# Patient Record
Sex: MALE | NOT HISPANIC OR LATINO | Employment: OTHER | URBAN - METROPOLITAN AREA
[De-identification: names, ages, dates, MRNs, and addresses within clinical notes are randomized per-mention and may not be internally consistent; named-entity substitution may affect disease eponyms.]

---

## 2023-02-14 PROBLEM — G89.29 CHRONIC LEFT SHOULDER PAIN: Status: ACTIVE | Noted: 2023-02-14

## 2023-02-14 PROBLEM — N28.9 ABNORMAL KIDNEY FUNCTION: Status: ACTIVE | Noted: 2023-02-14

## 2023-02-14 PROBLEM — R35.1 NOCTURIA: Status: ACTIVE | Noted: 2023-02-14

## 2023-02-14 PROBLEM — R26.0 ATAXIC GAIT: Status: ACTIVE | Noted: 2023-02-14

## 2023-02-14 PROBLEM — M25.512 CHRONIC LEFT SHOULDER PAIN: Status: ACTIVE | Noted: 2023-02-14

## 2023-02-14 PROBLEM — N18.30: Status: ACTIVE | Noted: 2023-02-14

## 2023-02-14 PROBLEM — R97.20 ELEVATED PSA: Status: ACTIVE | Noted: 2023-02-14

## 2023-02-14 PROBLEM — H69.90 EUSTACHIAN TUBE DYSFUNCTION: Status: ACTIVE | Noted: 2023-02-14

## 2023-02-14 PROBLEM — E53.8 VITAMIN B12 DEFICIENCY: Status: ACTIVE | Noted: 2023-02-14

## 2023-02-14 PROBLEM — M25.551 HIP PAIN, BILATERAL: Status: ACTIVE | Noted: 2023-02-14

## 2023-02-14 PROBLEM — N18.30 CKD STAGE 3 SECONDARY TO DIABETES (MULTI): Status: ACTIVE | Noted: 2023-02-14

## 2023-02-14 PROBLEM — G89.29 CHRONIC BILATERAL LOW BACK PAIN WITHOUT SCIATICA: Status: ACTIVE | Noted: 2023-02-14

## 2023-02-14 PROBLEM — E11.9 INSULIN-REQUIRING OR DEPENDENT TYPE II DIABETES MELLITUS (MULTI): Status: ACTIVE | Noted: 2023-02-14

## 2023-02-14 PROBLEM — K80.50 CHOLEDOCHOLITHIASIS: Status: ACTIVE | Noted: 2023-02-14

## 2023-02-14 PROBLEM — E13.22: Status: ACTIVE | Noted: 2023-02-14

## 2023-02-14 PROBLEM — R10.84 GENERALIZED ABDOMINAL PAIN: Status: ACTIVE | Noted: 2023-02-14

## 2023-02-14 PROBLEM — Z79.4 INSULIN-REQUIRING OR DEPENDENT TYPE II DIABETES MELLITUS (MULTI): Status: ACTIVE | Noted: 2023-02-14

## 2023-02-14 PROBLEM — E11.40 DIABETES MELLITUS WITH DIABETIC NEUROPATHY (MULTI): Status: ACTIVE | Noted: 2023-02-14

## 2023-02-14 PROBLEM — M25.552 HIP PAIN, BILATERAL: Status: ACTIVE | Noted: 2023-02-14

## 2023-02-14 PROBLEM — J30.9 ALLERGIC RHINITIS: Status: ACTIVE | Noted: 2023-02-14

## 2023-02-14 PROBLEM — E55.9 VITAMIN D DEFICIENCY: Status: ACTIVE | Noted: 2023-02-14

## 2023-02-14 PROBLEM — I10 BENIGN ESSENTIAL HYPERTENSION: Status: ACTIVE | Noted: 2023-02-14

## 2023-02-14 PROBLEM — M19.90 ARTHRITIS: Status: ACTIVE | Noted: 2023-02-14

## 2023-02-14 PROBLEM — M54.50 CHRONIC BILATERAL LOW BACK PAIN WITHOUT SCIATICA: Status: ACTIVE | Noted: 2023-02-14

## 2023-02-14 PROBLEM — N40.1 ENLARGED PROSTATE WITH LOWER URINARY TRACT SYMPTOMS (LUTS): Status: ACTIVE | Noted: 2023-02-14

## 2023-02-14 PROBLEM — R06.6 INTRACTABLE HICCUPS: Status: ACTIVE | Noted: 2023-02-14

## 2023-02-14 PROBLEM — N39.43 POST-VOID DRIBBLING: Status: ACTIVE | Noted: 2023-02-14

## 2023-02-14 PROBLEM — R17 JAUNDICE: Status: ACTIVE | Noted: 2023-02-14

## 2023-02-14 PROBLEM — K83.09 ASCENDING CHOLANGITIS (CMS-HCC): Status: ACTIVE | Noted: 2023-02-14

## 2023-02-14 PROBLEM — E11.22 CKD STAGE 3 SECONDARY TO DIABETES (MULTI): Status: ACTIVE | Noted: 2023-02-14

## 2023-02-14 PROBLEM — L30.9 ECZEMA: Status: ACTIVE | Noted: 2023-02-14

## 2023-02-14 PROBLEM — E11.9 DIABETES MELLITUS (MULTI): Status: ACTIVE | Noted: 2023-02-14

## 2023-02-14 PROBLEM — R50.9 FEVER AND CHILLS: Status: ACTIVE | Noted: 2023-02-14

## 2023-02-14 PROBLEM — K21.9 GERD (GASTROESOPHAGEAL REFLUX DISEASE): Status: ACTIVE | Noted: 2023-02-14

## 2023-02-14 PROBLEM — E78.5 HYPERLIPIDEMIA: Status: ACTIVE | Noted: 2023-02-14

## 2023-02-14 PROBLEM — M89.9 BONE DISORDER: Status: ACTIVE | Noted: 2023-02-14

## 2023-02-14 RX ORDER — LOSARTAN POTASSIUM 50 MG/1
1 TABLET ORAL DAILY
COMMUNITY
Start: 2019-02-28 | End: 2024-04-08

## 2023-02-14 RX ORDER — INSULIN LISPRO 100 [IU]/ML
3-7 INJECTION, SOLUTION INTRAVENOUS; SUBCUTANEOUS 3 TIMES DAILY
COMMUNITY
Start: 2018-08-01 | End: 2024-02-05

## 2023-02-14 RX ORDER — TAMSULOSIN HYDROCHLORIDE 0.4 MG/1
0.4 CAPSULE ORAL NIGHTLY
COMMUNITY
Start: 2019-11-25 | End: 2024-03-07 | Stop reason: SDUPTHER

## 2023-02-14 RX ORDER — OMEPRAZOLE 20 MG/1
1 CAPSULE, DELAYED RELEASE ORAL
COMMUNITY
End: 2024-02-26

## 2023-02-14 RX ORDER — BLOOD SUGAR DIAGNOSTIC
STRIP MISCELLANEOUS
COMMUNITY
Start: 2018-05-15

## 2023-02-14 RX ORDER — INSULIN GLARGINE 100 [IU]/ML
30 INJECTION, SOLUTION SUBCUTANEOUS EVERY MORNING
COMMUNITY
Start: 2018-08-01 | End: 2023-03-13 | Stop reason: SDUPTHER

## 2023-02-14 RX ORDER — LANCETS
EACH MISCELLANEOUS
COMMUNITY

## 2023-02-14 RX ORDER — PEN NEEDLE, DIABETIC 30 GX3/16"
NEEDLE, DISPOSABLE MISCELLANEOUS
COMMUNITY
End: 2024-02-05

## 2023-02-14 RX ORDER — ACETAMINOPHEN, DEXTROMETHORPHAN HBR, DOXYLAMINE SUCCINATE, PHENYLEPHRINE HCL 650; 20; 12.5; 1 MG/30ML; MG/30ML; MG/30ML; MG/30ML
2 SOLUTION ORAL EVERY MORNING
COMMUNITY
Start: 2018-05-21

## 2023-02-14 RX ORDER — MULTIVITAMIN
1 TABLET ORAL EVERY MORNING
COMMUNITY

## 2023-02-14 RX ORDER — NAPROXEN SODIUM 220 MG/1
1 TABLET ORAL DAILY
COMMUNITY

## 2023-02-14 RX ORDER — SIMVASTATIN 20 MG/1
20 TABLET, FILM COATED ORAL EVERY EVENING
COMMUNITY
Start: 2016-06-16 | End: 2024-01-19

## 2023-02-14 RX ORDER — ACETAMINOPHEN 500 MG
1 TABLET ORAL EVERY MORNING
COMMUNITY
Start: 2018-05-21

## 2023-02-14 RX ORDER — CALCIUM CITRATE/VITAMIN D3 200MG-6.25
TABLET ORAL
COMMUNITY
Start: 2019-02-28

## 2023-03-13 DIAGNOSIS — E11.40 TYPE 2 DIABETES MELLITUS WITH DIABETIC NEUROPATHY, UNSPECIFIED WHETHER LONG TERM INSULIN USE (MULTI): ICD-10-CM

## 2023-03-13 RX ORDER — INSULIN GLARGINE 100 [IU]/ML
30 INJECTION, SOLUTION SUBCUTANEOUS EVERY MORNING
Qty: 3 ML | Refills: 1 | Status: SHIPPED | OUTPATIENT
Start: 2023-03-13 | End: 2023-06-08

## 2023-03-27 ENCOUNTER — APPOINTMENT (OUTPATIENT)
Dept: PRIMARY CARE | Facility: CLINIC | Age: 83
End: 2023-03-27
Payer: MEDICARE

## 2023-04-05 ENCOUNTER — APPOINTMENT (OUTPATIENT)
Dept: PRIMARY CARE | Facility: CLINIC | Age: 83
End: 2023-04-05
Payer: MEDICARE

## 2023-04-06 ENCOUNTER — APPOINTMENT (OUTPATIENT)
Dept: PRIMARY CARE | Facility: CLINIC | Age: 83
End: 2023-04-06
Payer: MEDICARE

## 2023-05-15 ENCOUNTER — OFFICE VISIT (OUTPATIENT)
Dept: PRIMARY CARE | Facility: CLINIC | Age: 83
End: 2023-05-15
Payer: MEDICARE

## 2023-05-15 VITALS
HEART RATE: 80 BPM | SYSTOLIC BLOOD PRESSURE: 122 MMHG | DIASTOLIC BLOOD PRESSURE: 64 MMHG | HEIGHT: 70 IN | BODY MASS INDEX: 29.35 KG/M2 | WEIGHT: 205 LBS

## 2023-05-15 DIAGNOSIS — M35.3 POLYMYALGIA RHEUMATICA (MULTI): ICD-10-CM

## 2023-05-15 DIAGNOSIS — Z79.4 TYPE 2 DIABETES MELLITUS WITH DIABETIC NEUROPATHY, WITH LONG-TERM CURRENT USE OF INSULIN (MULTI): Primary | ICD-10-CM

## 2023-05-15 DIAGNOSIS — N18.30 CKD STAGE 3 SECONDARY TO DIABETES (MULTI): ICD-10-CM

## 2023-05-15 DIAGNOSIS — G89.29 CHRONIC RIGHT-SIDED LOW BACK PAIN WITHOUT SCIATICA: ICD-10-CM

## 2023-05-15 DIAGNOSIS — R35.1 NOCTURIA: ICD-10-CM

## 2023-05-15 DIAGNOSIS — M54.50 CHRONIC RIGHT-SIDED LOW BACK PAIN WITHOUT SCIATICA: ICD-10-CM

## 2023-05-15 DIAGNOSIS — E11.40 TYPE 2 DIABETES MELLITUS WITH DIABETIC NEUROPATHY, WITH LONG-TERM CURRENT USE OF INSULIN (MULTI): Primary | ICD-10-CM

## 2023-05-15 DIAGNOSIS — E11.22 CKD STAGE 3 SECONDARY TO DIABETES (MULTI): ICD-10-CM

## 2023-05-15 DIAGNOSIS — E11.40 TYPE 2 DIABETES MELLITUS WITH DIABETIC NEUROPATHY, UNSPECIFIED WHETHER LONG TERM INSULIN USE (MULTI): ICD-10-CM

## 2023-05-15 DIAGNOSIS — E78.5 DYSLIPIDEMIA, GOAL LDL BELOW 70: ICD-10-CM

## 2023-05-15 DIAGNOSIS — E53.8 VITAMIN B12 DEFICIENCY: ICD-10-CM

## 2023-05-15 PROCEDURE — 99214 OFFICE O/P EST MOD 30 MIN: CPT | Performed by: INTERNAL MEDICINE

## 2023-05-15 PROCEDURE — 3074F SYST BP LT 130 MM HG: CPT | Performed by: INTERNAL MEDICINE

## 2023-05-15 PROCEDURE — 1159F MED LIST DOCD IN RCRD: CPT | Performed by: INTERNAL MEDICINE

## 2023-05-15 PROCEDURE — 3078F DIAST BP <80 MM HG: CPT | Performed by: INTERNAL MEDICINE

## 2023-05-15 PROCEDURE — 1036F TOBACCO NON-USER: CPT | Performed by: INTERNAL MEDICINE

## 2023-05-15 PROCEDURE — 1160F RVW MEDS BY RX/DR IN RCRD: CPT | Performed by: INTERNAL MEDICINE

## 2023-05-15 ASSESSMENT — PATIENT HEALTH QUESTIONNAIRE - PHQ9
1. LITTLE INTEREST OR PLEASURE IN DOING THINGS: NOT AT ALL
2. FEELING DOWN, DEPRESSED OR HOPELESS: NOT AT ALL
SUM OF ALL RESPONSES TO PHQ9 QUESTIONS 1 AND 2: 0

## 2023-05-15 NOTE — PROGRESS NOTES
"Subjective   Patient ID: Kevin Donato is a 83 y.o. male who presents for 6 month follow up.    He saw orthopedics, Dr. Dupree, had an injection in the right knee and right thumb  His back and knees bother him. Has been wearing a belt  Hurts in his butt/right side, takes bufferin, takes 2 a day.   He has been more forget  His back hurts more when he stands for awhile.     No cp or pressure  No sob  Bowels are regular  Urine flow is good.   He has not been taking the gabapentin         Review of Systems    Objective   Pulse 80   Ht 1.778 m (5' 10\")   Wt 93 kg (205 lb)   BMI 29.41 kg/m²     Physical Exam  Constitutional:       Appearance: Normal appearance.   Neck:      Vascular: No carotid bruit.   Cardiovascular:      Rate and Rhythm: Normal rate and regular rhythm.      Heart sounds: No murmur heard.  Pulmonary:      Effort: Pulmonary effort is normal.      Breath sounds: Normal breath sounds.   Musculoskeletal:      Right lower leg: No edema.      Left lower leg: No edema.      Comments: Tender ropy right lumbar paraspinal mm  No foot drop   Lymphadenopathy:      Cervical: No cervical adenopathy.   Neurological:      Mental Status: He is alert.      Comments: Slight left facial droop,, wife states chronic   Psychiatric:         Mood and Affect: Mood normal.         Thought Content: Thought content normal.         Assessment/Plan          "

## 2023-05-15 NOTE — PATIENT INSTRUCTIONS
Bp is good    Get fasting labs    Lower back pain, get physical therapy and if not better, would get MRI    For your thumb arthtritis, see Dr. Chopra

## 2023-05-17 ENCOUNTER — LAB (OUTPATIENT)
Dept: LAB | Facility: LAB | Age: 83
End: 2023-05-17
Payer: MEDICARE

## 2023-05-17 DIAGNOSIS — E11.40 TYPE 2 DIABETES MELLITUS WITH DIABETIC NEUROPATHY, WITH LONG-TERM CURRENT USE OF INSULIN (MULTI): ICD-10-CM

## 2023-05-17 DIAGNOSIS — Z79.4 TYPE 2 DIABETES MELLITUS WITH DIABETIC NEUROPATHY, WITH LONG-TERM CURRENT USE OF INSULIN (MULTI): ICD-10-CM

## 2023-05-17 DIAGNOSIS — E78.5 DYSLIPIDEMIA, GOAL LDL BELOW 70: ICD-10-CM

## 2023-05-17 DIAGNOSIS — R35.1 NOCTURIA: ICD-10-CM

## 2023-05-17 LAB
ALANINE AMINOTRANSFERASE (SGPT) (U/L) IN SER/PLAS: 8 U/L (ref 10–52)
ALBUMIN (G/DL) IN SER/PLAS: 4.2 G/DL (ref 3.4–5)
ALKALINE PHOSPHATASE (U/L) IN SER/PLAS: 65 U/L (ref 33–136)
ANION GAP IN SER/PLAS: 13 MMOL/L (ref 10–20)
ASPARTATE AMINOTRANSFERASE (SGOT) (U/L) IN SER/PLAS: 17 U/L (ref 9–39)
BASOPHILS (10*3/UL) IN BLOOD BY AUTOMATED COUNT: 0.06 X10E9/L (ref 0–0.1)
BASOPHILS/100 LEUKOCYTES IN BLOOD BY AUTOMATED COUNT: 0.9 % (ref 0–2)
BILIRUBIN TOTAL (MG/DL) IN SER/PLAS: 1 MG/DL (ref 0–1.2)
CALCIUM (MG/DL) IN SER/PLAS: 9.1 MG/DL (ref 8.6–10.3)
CARBON DIOXIDE, TOTAL (MMOL/L) IN SER/PLAS: 26 MMOL/L (ref 21–32)
CHLORIDE (MMOL/L) IN SER/PLAS: 106 MMOL/L (ref 98–107)
CHOLESTEROL (MG/DL) IN SER/PLAS: 133 MG/DL (ref 0–199)
CHOLESTEROL IN HDL (MG/DL) IN SER/PLAS: 47.2 MG/DL
CHOLESTEROL/HDL RATIO: 2.8
CREATINE KINASE (U/L) IN SER/PLAS: 104 U/L (ref 0–325)
CREATININE (MG/DL) IN SER/PLAS: 1.78 MG/DL (ref 0.5–1.3)
EOSINOPHILS (10*3/UL) IN BLOOD BY AUTOMATED COUNT: 0.62 X10E9/L (ref 0–0.4)
EOSINOPHILS/100 LEUKOCYTES IN BLOOD BY AUTOMATED COUNT: 9.1 % (ref 0–6)
ERYTHROCYTE DISTRIBUTION WIDTH (RATIO) BY AUTOMATED COUNT: 12.6 % (ref 11.5–14.5)
ERYTHROCYTE MEAN CORPUSCULAR HEMOGLOBIN CONCENTRATION (G/DL) BY AUTOMATED: 32.9 G/DL (ref 32–36)
ERYTHROCYTE MEAN CORPUSCULAR VOLUME (FL) BY AUTOMATED COUNT: 100 FL (ref 80–100)
ERYTHROCYTES (10*6/UL) IN BLOOD BY AUTOMATED COUNT: 4.27 X10E12/L (ref 4.5–5.9)
GFR MALE: 37 ML/MIN/1.73M2
GLUCOSE (MG/DL) IN SER/PLAS: 116 MG/DL (ref 74–99)
HEMATOCRIT (%) IN BLOOD BY AUTOMATED COUNT: 42.6 % (ref 41–52)
HEMOGLOBIN (G/DL) IN BLOOD: 14 G/DL (ref 13.5–17.5)
IMMATURE GRANULOCYTES/100 LEUKOCYTES IN BLOOD BY AUTOMATED COUNT: 0.3 % (ref 0–0.9)
LDL: 66 MG/DL (ref 0–99)
LEUKOCYTES (10*3/UL) IN BLOOD BY AUTOMATED COUNT: 6.8 X10E9/L (ref 4.4–11.3)
LYMPHOCYTES (10*3/UL) IN BLOOD BY AUTOMATED COUNT: 2.16 X10E9/L (ref 0.8–3)
LYMPHOCYTES/100 LEUKOCYTES IN BLOOD BY AUTOMATED COUNT: 31.7 % (ref 13–44)
MONOCYTES (10*3/UL) IN BLOOD BY AUTOMATED COUNT: 0.5 X10E9/L (ref 0.05–0.8)
MONOCYTES/100 LEUKOCYTES IN BLOOD BY AUTOMATED COUNT: 7.3 % (ref 2–10)
NEUTROPHILS (10*3/UL) IN BLOOD BY AUTOMATED COUNT: 3.46 X10E9/L (ref 1.6–5.5)
NEUTROPHILS/100 LEUKOCYTES IN BLOOD BY AUTOMATED COUNT: 50.7 % (ref 40–80)
PLATELETS (10*3/UL) IN BLOOD AUTOMATED COUNT: 174 X10E9/L (ref 150–450)
POTASSIUM (MMOL/L) IN SER/PLAS: 4.4 MMOL/L (ref 3.5–5.3)
PROSTATE SPECIFIC AG (NG/ML) IN SER/PLAS: 4.78 NG/ML (ref 0–4)
PROTEIN TOTAL: 6.6 G/DL (ref 6.4–8.2)
SODIUM (MMOL/L) IN SER/PLAS: 141 MMOL/L (ref 136–145)
THYROTROPIN (MIU/L) IN SER/PLAS BY DETECTION LIMIT <= 0.05 MIU/L: 1.52 MIU/L (ref 0.44–3.98)
TRIGLYCERIDE (MG/DL) IN SER/PLAS: 100 MG/DL (ref 0–149)
UREA NITROGEN (MG/DL) IN SER/PLAS: 26 MG/DL (ref 6–23)
VLDL: 20 MG/DL (ref 0–40)

## 2023-05-17 PROCEDURE — 80053 COMPREHEN METABOLIC PANEL: CPT

## 2023-05-17 PROCEDURE — 84443 ASSAY THYROID STIM HORMONE: CPT

## 2023-05-17 PROCEDURE — 36415 COLL VENOUS BLD VENIPUNCTURE: CPT

## 2023-05-17 PROCEDURE — 84153 ASSAY OF PSA TOTAL: CPT

## 2023-05-17 PROCEDURE — 82550 ASSAY OF CK (CPK): CPT

## 2023-05-17 PROCEDURE — 83036 HEMOGLOBIN GLYCOSYLATED A1C: CPT

## 2023-05-17 PROCEDURE — 80061 LIPID PANEL: CPT

## 2023-05-17 PROCEDURE — 85025 COMPLETE CBC W/AUTO DIFF WBC: CPT

## 2023-05-18 LAB
ESTIMATED AVERAGE GLUCOSE FOR HBA1C: 166 MG/DL
HEMOGLOBIN A1C/HEMOGLOBIN TOTAL IN BLOOD: 7.4 %

## 2023-06-08 DIAGNOSIS — G89.29 CHRONIC BILATERAL LOW BACK PAIN WITHOUT SCIATICA: ICD-10-CM

## 2023-06-08 DIAGNOSIS — M54.50 CHRONIC BILATERAL LOW BACK PAIN WITHOUT SCIATICA: ICD-10-CM

## 2023-06-08 DIAGNOSIS — E11.40 TYPE 2 DIABETES MELLITUS WITH DIABETIC NEUROPATHY, UNSPECIFIED WHETHER LONG TERM INSULIN USE (MULTI): ICD-10-CM

## 2023-06-08 RX ORDER — INSULIN GLARGINE 100 [IU]/ML
INJECTION, SOLUTION SUBCUTANEOUS
Qty: 15 ML | Refills: 0 | Status: SHIPPED | OUTPATIENT
Start: 2023-06-08 | End: 2023-07-14

## 2023-06-08 RX ORDER — GABAPENTIN 100 MG/1
CAPSULE ORAL
Qty: 120 CAPSULE | Refills: 1 | Status: SHIPPED | OUTPATIENT
Start: 2023-06-08 | End: 2023-09-20

## 2023-06-08 NOTE — TELEPHONE ENCOUNTER
Requested Prescriptions     Pending Prescriptions Disp Refills    gabapentin (Neurontin) 100 mg capsule [Pharmacy Med Name: GABAPENTIN 100 MG CAPSULE] 120 capsule 0     Sig: TAKE 2 CAPSULE BY MOUTH 2 TIMES A DAY    Lantus Solostar U-100 Insulin 100 unit/mL (3 mL) pen [Pharmacy Med Name: LANTUS SOLOSTAR 100 UNITS/ML] 15 mL 0     Sig: Inject 30 Units under the skin once daily in the morning.

## 2023-07-13 DIAGNOSIS — E11.40 TYPE 2 DIABETES MELLITUS WITH DIABETIC NEUROPATHY, UNSPECIFIED WHETHER LONG TERM INSULIN USE (MULTI): ICD-10-CM

## 2023-07-13 NOTE — TELEPHONE ENCOUNTER
Requested Prescriptions     Pending Prescriptions Disp Refills    Lantus Solostar U-100 Insulin 100 unit/mL (3 mL) pen [Pharmacy Med Name: LANTUS SOLOSTAR 100 UNITS/ML] 15 mL 1     Sig: Inject 30 Units under the skin once daily in the morning.

## 2023-07-14 RX ORDER — INSULIN GLARGINE 100 [IU]/ML
INJECTION, SOLUTION SUBCUTANEOUS
Qty: 15 ML | Refills: 1 | Status: SHIPPED | OUTPATIENT
Start: 2023-07-14 | End: 2023-11-01

## 2023-08-18 PROBLEM — Z78.9 DURABLE POWER OF ATTORNEY FOR HEALTHCARE EXISTS BUT COPY NOT AVAILABLE: Status: ACTIVE | Noted: 2023-08-18

## 2023-08-18 PROBLEM — R27.0 ATAXIA, UNSPECIFIED: Status: ACTIVE | Noted: 2018-06-28

## 2023-08-18 PROBLEM — K85.91: Status: ACTIVE | Noted: 2018-06-28

## 2023-08-18 RX ORDER — OXYBUTYNIN CHLORIDE 5 MG/1
TABLET ORAL NIGHTLY
COMMUNITY
End: 2023-11-15 | Stop reason: WASHOUT

## 2023-08-18 RX ORDER — COVID-19 ANTIGEN TEST
KIT MISCELLANEOUS
COMMUNITY
Start: 2023-04-18 | End: 2024-05-28 | Stop reason: HOSPADM

## 2023-08-18 RX ORDER — ACETAMINOPHEN 325 MG/1
2 TABLET ORAL EVERY 4 HOURS PRN
COMMUNITY

## 2023-09-20 DIAGNOSIS — G89.29 CHRONIC BILATERAL LOW BACK PAIN WITHOUT SCIATICA: ICD-10-CM

## 2023-09-20 DIAGNOSIS — M54.50 CHRONIC BILATERAL LOW BACK PAIN WITHOUT SCIATICA: ICD-10-CM

## 2023-09-20 RX ORDER — GABAPENTIN 100 MG/1
CAPSULE ORAL
Qty: 120 CAPSULE | Refills: 0 | Status: SHIPPED | OUTPATIENT
Start: 2023-09-20 | End: 2023-11-06

## 2023-10-25 ENCOUNTER — APPOINTMENT (OUTPATIENT)
Dept: UROLOGY | Facility: CLINIC | Age: 83
End: 2023-10-25
Payer: MEDICARE

## 2023-11-01 DIAGNOSIS — E11.40 TYPE 2 DIABETES MELLITUS WITH DIABETIC NEUROPATHY, UNSPECIFIED WHETHER LONG TERM INSULIN USE (MULTI): ICD-10-CM

## 2023-11-01 RX ORDER — INSULIN GLARGINE 100 [IU]/ML
INJECTION, SOLUTION SUBCUTANEOUS
Qty: 15 ML | Refills: 1 | Status: SHIPPED | OUTPATIENT
Start: 2023-11-01 | End: 2023-11-16 | Stop reason: SDUPTHER

## 2023-11-01 NOTE — TELEPHONE ENCOUNTER
Requested Prescriptions     Pending Prescriptions Disp Refills    Lantus Solostar U-100 Insulin 100 unit/mL (3 mL) pen [Pharmacy Med Name: Lantus Solostar U-100 Insulin 100 unit/mL (3 mL) subcutaneous pen] 15 mL 1     Sig: Inject 30 Units under the skin once daily in the morning.

## 2023-11-06 DIAGNOSIS — M54.50 CHRONIC BILATERAL LOW BACK PAIN WITHOUT SCIATICA: ICD-10-CM

## 2023-11-06 DIAGNOSIS — G89.29 CHRONIC BILATERAL LOW BACK PAIN WITHOUT SCIATICA: ICD-10-CM

## 2023-11-06 RX ORDER — GABAPENTIN 100 MG/1
CAPSULE ORAL
Qty: 120 CAPSULE | Refills: 0 | Status: SHIPPED | OUTPATIENT
Start: 2023-11-06 | End: 2023-12-08

## 2023-11-15 ENCOUNTER — OFFICE VISIT (OUTPATIENT)
Dept: ENDOCRINOLOGY | Facility: CLINIC | Age: 83
End: 2023-11-15
Payer: MEDICARE

## 2023-11-15 VITALS
WEIGHT: 203 LBS | BODY MASS INDEX: 29.13 KG/M2 | SYSTOLIC BLOOD PRESSURE: 117 MMHG | DIASTOLIC BLOOD PRESSURE: 67 MMHG | HEART RATE: 56 BPM

## 2023-11-15 DIAGNOSIS — Z79.4 TYPE 2 DIABETES MELLITUS WITHOUT COMPLICATION, WITH LONG-TERM CURRENT USE OF INSULIN (MULTI): Primary | ICD-10-CM

## 2023-11-15 DIAGNOSIS — E11.9 TYPE 2 DIABETES MELLITUS WITHOUT COMPLICATION, WITH LONG-TERM CURRENT USE OF INSULIN (MULTI): Primary | ICD-10-CM

## 2023-11-15 LAB — POC HEMOGLOBIN A1C: 7.4 % (ref 4.2–6.5)

## 2023-11-15 PROCEDURE — 3078F DIAST BP <80 MM HG: CPT | Performed by: INTERNAL MEDICINE

## 2023-11-15 PROCEDURE — 83036 HEMOGLOBIN GLYCOSYLATED A1C: CPT | Performed by: INTERNAL MEDICINE

## 2023-11-15 PROCEDURE — 3074F SYST BP LT 130 MM HG: CPT | Performed by: INTERNAL MEDICINE

## 2023-11-15 PROCEDURE — 99214 OFFICE O/P EST MOD 30 MIN: CPT | Performed by: INTERNAL MEDICINE

## 2023-11-15 PROCEDURE — 1036F TOBACCO NON-USER: CPT | Performed by: INTERNAL MEDICINE

## 2023-11-15 PROCEDURE — 1159F MED LIST DOCD IN RCRD: CPT | Performed by: INTERNAL MEDICINE

## 2023-11-15 PROCEDURE — 1160F RVW MEDS BY RX/DR IN RCRD: CPT | Performed by: INTERNAL MEDICINE

## 2023-11-15 ASSESSMENT — ENCOUNTER SYMPTOMS
ENDOCRINE COMMENTS: AS ABOVE
VOMITING: 0
DIARRHEA: 0
UNEXPECTED WEIGHT CHANGE: 0
NAUSEA: 0

## 2023-11-15 NOTE — PROGRESS NOTES
History Of Present Illness  Kevin Donato is a 83 y.o. male     Duration of type 2 diabetes mellitus:  20 years    History of pancreatitis (2018) attributed to Victoza    Lantus 35 units every morning     Humalog three times daily before meals  Glucose  150-200--3 units  201-250--4 units  251-300--5 units  301-350--6 units  351-400--7 units    FreeStyle Vahe 2  Patient is testing glucose 288 times daily  Records reviewed, on file    Last eye exam:  December 2022    Past Medical History  He has a past medical history of Calculus of bile duct without cholangitis or cholecystitis without obstruction (02/21/2022), Fever, unspecified (01/10/2022), Generalized abdominal pain (01/10/2022), Hiccough (08/31/2022), 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), Otalgia, right ear (04/28/2016), Other cholangitis (02/14/2022), Other iron deficiency anemias (12/16/2016), Other skin changes (10/13/2017), Pain in right shoulder (10/07/2016), Parkinson's disease (03/23/2020), Personal history of diseases of the skin and subcutaneous tissue (02/28/2019), Personal history of other diseases of the circulatory system, Personal history of other diseases of the digestive system (08/10/2018), Personal history of other diseases of the musculoskeletal system and connective tissue (02/26/2020), Personal history of other diseases of the nervous system and sense organs (05/21/2018), Personal history of other diseases of the nervous system and sense organs (04/07/2015), Personal history of other diseases of the respiratory system (01/10/2019), Personal history of other diseases of urinary system (11/12/2014), Personal history of other specified conditions (10/16/2017), Personal history of other specified conditions (01/11/2022), and Type 2 diabetes mellitus without complications (CMS/HCC) (11/29/2022).    Surgical History  He has a past surgical history that includes Hernia repair (02/14/2017); Nose surgery  (02/14/2017); and Other surgical history (05/24/2022).     Social History  He reports that he has never smoked. He has never used smokeless tobacco. He reports that he does not drink alcohol and does not use drugs.    Family History  Family History   Problem Relation Name Age of Onset    Diabetes Mother      Colon cancer Sister      Leukemia Sister         Allergies  Lisinopril    Review of Systems   Constitutional:  Negative for unexpected weight change.   Eyes:  Negative for visual disturbance.   Gastrointestinal:  Negative for diarrhea, nausea and vomiting.   Endocrine:        As above         Last Recorded Vitals  Blood pressure 117/67, pulse 56, weight 92.1 kg (203 lb).    Physical Exam  Constitutional:       General: He is not in acute distress.  HENT:      Head: Normocephalic.   Eyes:      Extraocular Movements: Extraocular movements intact.   Neck:      Thyroid: No thyromegaly.   Cardiovascular:      Pulses:           Radial pulses are 2+ on the right side and 2+ on the left side.      Comments: Trace ankle edema bilat  Feet:      Comments: No foot sores  Lymphadenopathy:      Cervical: No cervical adenopathy.   Neurological:      Mental Status: He is alert.      Motor: No tremor.   Psychiatric:         Mood and Affect: Affect normal.            IMPRESSION  TYPE 2 DIABETES MELLITUS  LONG TERM CURRENT INSULIN USE  Rapid A1c 7.4%  Adequate glucose control for age and comorbidities  No hypoglycemia      RECOMMENDATIONS  Continue current program    Follow up 6 months  Labs as ordered by Dr. Schroeder.

## 2023-11-15 NOTE — LETTER
November 15, 2023     Yarely Schroeder DO  [address]  [address]  [facility] [address]    Patient: Kevin Donato   YOB: 1940   Date of Visit: 11/15/2023       Dear Dr. Yarely Schroeder DO:    Thank you for referring Kevin Donato to me for evaluation. Below are my notes for this consultation.  If you have questions, please do not hesitate to call me. I look forward to following your patient along with you.       Sincerely,     Juan C Denney MD      CC: No Recipients  ______________________________________________________________________________________    History Of Present Illness  Kevin Donato is a 83 y.o. male     Duration of type 2 diabetes mellitus:  20 years    History of pancreatitis (2018) attributed to Victoza    Lantus 35 units every morning     Humalog three times daily before meals  Glucose  150-200--3 units  201-250--4 units  251-300--5 units  301-350--6 units  351-400--7 units    FreeStyle Vahe 2  Patient is testing glucose 288 times daily  Records reviewed, on file    Last eye exam:  December 2022    Past Medical History  He has a past medical history of Calculus of bile duct without cholangitis or cholecystitis without obstruction (02/21/2022), Fever, unspecified (01/10/2022), Generalized abdominal pain (01/10/2022), Hiccough (08/31/2022), 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), Otalgia, right ear (04/28/2016), Other cholangitis (02/14/2022), Other iron deficiency anemias (12/16/2016), Other skin changes (10/13/2017), Pain in right shoulder (10/07/2016), Parkinson's disease (03/23/2020), Personal history of diseases of the skin and subcutaneous tissue (02/28/2019), Personal history of other diseases of the circulatory system, Personal history of other diseases of the digestive system (08/10/2018), Personal history of other diseases of the musculoskeletal system and connective tissue (02/26/2020), Personal history of other diseases of the  nervous system and sense organs (05/21/2018), Personal history of other diseases of the nervous system and sense organs (04/07/2015), Personal history of other diseases of the respiratory system (01/10/2019), Personal history of other diseases of urinary system (11/12/2014), Personal history of other specified conditions (10/16/2017), Personal history of other specified conditions (01/11/2022), and Type 2 diabetes mellitus without complications (CMS/HCC) (11/29/2022).    Surgical History  He has a past surgical history that includes Hernia repair (02/14/2017); Nose surgery (02/14/2017); and Other surgical history (05/24/2022).     Social History  He reports that he has never smoked. He has never used smokeless tobacco. He reports that he does not drink alcohol and does not use drugs.    Family History  Family History   Problem Relation Name Age of Onset   • Diabetes Mother     • Colon cancer Sister     • Leukemia Sister         Allergies  Lisinopril    Review of Systems   Constitutional:  Negative for unexpected weight change.   Eyes:  Negative for visual disturbance.   Gastrointestinal:  Negative for diarrhea, nausea and vomiting.   Endocrine:        As above         Last Recorded Vitals  Blood pressure 117/67, pulse 56, weight 92.1 kg (203 lb).    Physical Exam  Constitutional:       General: He is not in acute distress.  HENT:      Head: Normocephalic.   Eyes:      Extraocular Movements: Extraocular movements intact.   Neck:      Thyroid: No thyromegaly.   Cardiovascular:      Pulses:           Radial pulses are 2+ on the right side and 2+ on the left side.      Comments: Trace ankle edema bilat  Feet:      Comments: No foot sores  Lymphadenopathy:      Cervical: No cervical adenopathy.   Neurological:      Mental Status: He is alert.      Motor: No tremor.   Psychiatric:         Mood and Affect: Affect normal.            IMPRESSION  TYPE 2 DIABETES MELLITUS  LONG TERM CURRENT INSULIN USE  Rapid A1c  7.4%  Adequate glucose control for age and comorbidities  No hypoglycemia      RECOMMENDATIONS  Continue current program    Follow up 6 months  Labs as ordered by Dr. Schroeder.

## 2023-11-15 NOTE — PATIENT INSTRUCTIONS
A1c 7.4%    RECOMMENDATIONS  Continue current program    Follow up 6 months  Labs as ordered by Dr. Schroeder.

## 2023-11-16 ENCOUNTER — OFFICE VISIT (OUTPATIENT)
Dept: PRIMARY CARE | Facility: CLINIC | Age: 83
End: 2023-11-16
Payer: MEDICARE

## 2023-11-16 VITALS
DIASTOLIC BLOOD PRESSURE: 70 MMHG | SYSTOLIC BLOOD PRESSURE: 133 MMHG | BODY MASS INDEX: 29.06 KG/M2 | WEIGHT: 203 LBS | HEART RATE: 55 BPM | HEIGHT: 70 IN

## 2023-11-16 DIAGNOSIS — Z00.00 ROUTINE GENERAL MEDICAL EXAMINATION AT HEALTH CARE FACILITY: Primary | ICD-10-CM

## 2023-11-16 DIAGNOSIS — Z79.4 TYPE 2 DIABETES MELLITUS WITH DIABETIC NEUROPATHY, WITH LONG-TERM CURRENT USE OF INSULIN (MULTI): ICD-10-CM

## 2023-11-16 DIAGNOSIS — E78.00 PURE HYPERCHOLESTEROLEMIA: ICD-10-CM

## 2023-11-16 DIAGNOSIS — E11.40 TYPE 2 DIABETES MELLITUS WITH DIABETIC NEUROPATHY, UNSPECIFIED WHETHER LONG TERM INSULIN USE (MULTI): ICD-10-CM

## 2023-11-16 DIAGNOSIS — E53.8 VITAMIN B12 DEFICIENCY: ICD-10-CM

## 2023-11-16 DIAGNOSIS — L20.82 FLEXURAL ECZEMA: ICD-10-CM

## 2023-11-16 DIAGNOSIS — I10 BENIGN ESSENTIAL HYPERTENSION: ICD-10-CM

## 2023-11-16 DIAGNOSIS — E11.40 TYPE 2 DIABETES MELLITUS WITH DIABETIC NEUROPATHY, WITH LONG-TERM CURRENT USE OF INSULIN (MULTI): ICD-10-CM

## 2023-11-16 DIAGNOSIS — N40.1 BENIGN PROSTATIC HYPERPLASIA WITH LOWER URINARY TRACT SYMPTOMS, SYMPTOM DETAILS UNSPECIFIED: ICD-10-CM

## 2023-11-16 PROCEDURE — 1159F MED LIST DOCD IN RCRD: CPT | Performed by: INTERNAL MEDICINE

## 2023-11-16 PROCEDURE — 1160F RVW MEDS BY RX/DR IN RCRD: CPT | Performed by: INTERNAL MEDICINE

## 2023-11-16 PROCEDURE — G0439 PPPS, SUBSEQ VISIT: HCPCS | Performed by: INTERNAL MEDICINE

## 2023-11-16 PROCEDURE — 3078F DIAST BP <80 MM HG: CPT | Performed by: INTERNAL MEDICINE

## 2023-11-16 PROCEDURE — 1170F FXNL STATUS ASSESSED: CPT | Performed by: INTERNAL MEDICINE

## 2023-11-16 PROCEDURE — 99214 OFFICE O/P EST MOD 30 MIN: CPT | Performed by: INTERNAL MEDICINE

## 2023-11-16 PROCEDURE — 3075F SYST BP GE 130 - 139MM HG: CPT | Performed by: INTERNAL MEDICINE

## 2023-11-16 PROCEDURE — 1036F TOBACCO NON-USER: CPT | Performed by: INTERNAL MEDICINE

## 2023-11-16 RX ORDER — TRIAMCINOLONE ACETONIDE 1 MG/G
CREAM TOPICAL 2 TIMES DAILY
Qty: 30 G | Refills: 0 | Status: SHIPPED | OUTPATIENT
Start: 2023-11-16

## 2023-11-16 RX ORDER — INSULIN GLARGINE 100 [IU]/ML
35 INJECTION, SOLUTION SUBCUTANEOUS NIGHTLY
Qty: 100 EACH | Refills: 2 | Status: ON HOLD | OUTPATIENT
Start: 2023-11-16 | End: 2024-05-28

## 2023-11-16 ASSESSMENT — ACTIVITIES OF DAILY LIVING (ADL)
DOING_HOUSEWORK: INDEPENDENT
TAKING_MEDICATION: INDEPENDENT
DRESSING: INDEPENDENT
BATHING: INDEPENDENT
MANAGING_FINANCES: INDEPENDENT
GROCERY_SHOPPING: INDEPENDENT

## 2023-11-16 NOTE — PROGRESS NOTES
"Subjective   Reason for Visit: Kevin Donato is an 83 y.o. male here for a Medicare Wellness visit.     Past Medical, Surgical, and Family History reviewed and updated in chart.    Reviewed all medications by prescribing practitioner or clinical pharmacist (such as prescriptions, OTCs, herbal therapies and supplements) and documented in the medical record.    A1c was 7.4 yesterday and no medicine changes  Is using the laney for CGM,     His back is still intermittently bad, he has not been his lower back exercise.   Has been doing a lot more yard work    Has a rash behind his left knee  Has an area on his back that is itchy and gets red.   Comes and goes  No where else    Has been doing a lot of work, no cp or pressure, no sob. No LH or palpitations  He cannot lift things over 50lbs.    Mood is good.   Has been sad, had to put down his cat            Patient Care Team:  Yarely Schroeder, DO as PCP - General  Yarely Schroeder, DO as PCP - [facility] Attributed Provider     Review of Systems    Objective   Vitals:  Ht 1.778 m (5' 10\")   Wt 92.1 kg (203 lb)   BMI 29.13 kg/m²       Physical Exam  Constitutional:       Appearance: Normal appearance.   Cardiovascular:      Rate and Rhythm: Normal rate and regular rhythm.      Heart sounds: No murmur heard.  Pulmonary:      Effort: Pulmonary effort is normal.      Breath sounds: Normal breath sounds.   Musculoskeletal:      Right lower leg: No edema.      Left lower leg: No edema.   Skin:     Comments: Patch of eczematous red rash with scale with distinct border in left popliteal area   Neurological:      Mental Status: He is alert and oriented to person, place, and time.   Psychiatric:         Mood and Affect: Mood normal.         Assessment/Plan   Problem List Items Addressed This Visit    None  Visit Diagnoses       Routine general medical examination at health care facility    -  Primary                 Patient Instructions   Hypertension, well " controlled    Cholesterol, get fasting labs    Recommend RSV/Arexvy shot    Diabetes, per Dr. Denney, well controlled.     Checking psa    Rash behind knee is eczema, when flares use triamcinolone cream twice daily    Recheck in 6 months

## 2023-11-16 NOTE — PATIENT INSTRUCTIONS
Hypertension, well controlled    Cholesterol, get fasting labs    Recommend RSV/Arexvy shot    Diabetes, per Dr. Denney, well controlled.     Checking psa    Rash behind knee is eczema, when flares use triamcinolone cream twice daily    Recheck in 6 months

## 2023-11-16 NOTE — PROGRESS NOTES
Subjective   Patient ID: Kevin Donato is a 83 y.o. male who presents for Medicare Annual Wellness Visit Subsequent.    HPI     Review of Systems    Objective   There were no vitals taken for this visit.    Physical Exam    Assessment/Plan

## 2023-11-29 ENCOUNTER — LAB (OUTPATIENT)
Dept: LAB | Facility: LAB | Age: 83
End: 2023-11-29
Payer: MEDICARE

## 2023-11-29 DIAGNOSIS — E78.00 PURE HYPERCHOLESTEROLEMIA: ICD-10-CM

## 2023-11-29 DIAGNOSIS — Z79.4 TYPE 2 DIABETES MELLITUS WITH DIABETIC NEUROPATHY, WITH LONG-TERM CURRENT USE OF INSULIN (MULTI): ICD-10-CM

## 2023-11-29 DIAGNOSIS — N40.1 BENIGN PROSTATIC HYPERPLASIA WITH LOWER URINARY TRACT SYMPTOMS, SYMPTOM DETAILS UNSPECIFIED: ICD-10-CM

## 2023-11-29 DIAGNOSIS — E11.40 TYPE 2 DIABETES MELLITUS WITH DIABETIC NEUROPATHY, WITH LONG-TERM CURRENT USE OF INSULIN (MULTI): ICD-10-CM

## 2023-11-29 LAB
ALBUMIN SERPL BCP-MCNC: 4.1 G/DL (ref 3.4–5)
ALP SERPL-CCNC: 61 U/L (ref 33–136)
ALT SERPL W P-5'-P-CCNC: 9 U/L (ref 10–52)
ANION GAP SERPL CALC-SCNC: 15 MMOL/L (ref 10–20)
AST SERPL W P-5'-P-CCNC: 18 U/L (ref 9–39)
BASOPHILS # BLD AUTO: 0.07 X10*3/UL (ref 0–0.1)
BASOPHILS NFR BLD AUTO: 0.9 %
BILIRUB SERPL-MCNC: 1.1 MG/DL (ref 0–1.2)
BUN SERPL-MCNC: 35 MG/DL (ref 6–23)
CALCIUM SERPL-MCNC: 9.2 MG/DL (ref 8.6–10.3)
CHLORIDE SERPL-SCNC: 105 MMOL/L (ref 98–107)
CHOLEST SERPL-MCNC: 150 MG/DL (ref 0–199)
CHOLESTEROL/HDL RATIO: 3.4
CK SERPL-CCNC: 105 U/L (ref 0–325)
CO2 SERPL-SCNC: 26 MMOL/L (ref 21–32)
CREAT SERPL-MCNC: 1.72 MG/DL (ref 0.5–1.3)
EOSINOPHIL # BLD AUTO: 0.67 X10*3/UL (ref 0–0.4)
EOSINOPHIL NFR BLD AUTO: 8.2 %
ERYTHROCYTE [DISTWIDTH] IN BLOOD BY AUTOMATED COUNT: 12.7 % (ref 11.5–14.5)
GFR SERPL CREATININE-BSD FRML MDRD: 38 ML/MIN/1.73M*2
GLUCOSE SERPL-MCNC: 130 MG/DL (ref 74–99)
HCT VFR BLD AUTO: 42.7 % (ref 41–52)
HDLC SERPL-MCNC: 43.5 MG/DL
HGB BLD-MCNC: 14 G/DL (ref 13.5–17.5)
IMM GRANULOCYTES # BLD AUTO: 0.01 X10*3/UL (ref 0–0.5)
IMM GRANULOCYTES NFR BLD AUTO: 0.1 % (ref 0–0.9)
LDLC SERPL CALC-MCNC: 81 MG/DL
LYMPHOCYTES # BLD AUTO: 2.52 X10*3/UL (ref 0.8–3)
LYMPHOCYTES NFR BLD AUTO: 30.8 %
MCH RBC QN AUTO: 32.9 PG (ref 26–34)
MCHC RBC AUTO-ENTMCNC: 32.8 G/DL (ref 32–36)
MCV RBC AUTO: 101 FL (ref 80–100)
MONOCYTES # BLD AUTO: 0.54 X10*3/UL (ref 0.05–0.8)
MONOCYTES NFR BLD AUTO: 6.6 %
NEUTROPHILS # BLD AUTO: 4.36 X10*3/UL (ref 1.6–5.5)
NEUTROPHILS NFR BLD AUTO: 53.4 %
NON HDL CHOLESTEROL: 107 MG/DL (ref 0–149)
NRBC BLD-RTO: 0 /100 WBCS (ref 0–0)
PLATELET # BLD AUTO: 182 X10*3/UL (ref 150–450)
POTASSIUM SERPL-SCNC: 5 MMOL/L (ref 3.5–5.3)
PROT SERPL-MCNC: 6.5 G/DL (ref 6.4–8.2)
RBC # BLD AUTO: 4.25 X10*6/UL (ref 4.5–5.9)
SODIUM SERPL-SCNC: 141 MMOL/L (ref 136–145)
TRIGL SERPL-MCNC: 130 MG/DL (ref 0–149)
TSH SERPL-ACNC: 1.45 MIU/L (ref 0.44–3.98)
VLDL: 26 MG/DL (ref 0–40)
WBC # BLD AUTO: 8.2 X10*3/UL (ref 4.4–11.3)

## 2023-11-29 PROCEDURE — 82607 VITAMIN B-12: CPT

## 2023-11-29 PROCEDURE — 82550 ASSAY OF CK (CPK): CPT

## 2023-11-29 PROCEDURE — 84153 ASSAY OF PSA TOTAL: CPT

## 2023-11-29 PROCEDURE — 80061 LIPID PANEL: CPT

## 2023-11-29 PROCEDURE — 80053 COMPREHEN METABOLIC PANEL: CPT

## 2023-11-29 PROCEDURE — 84443 ASSAY THYROID STIM HORMONE: CPT

## 2023-11-29 PROCEDURE — 85025 COMPLETE CBC W/AUTO DIFF WBC: CPT

## 2023-11-29 PROCEDURE — 36415 COLL VENOUS BLD VENIPUNCTURE: CPT

## 2023-11-30 LAB
PSA SERPL-MCNC: 5.6 NG/ML
VIT B12 SERPL-MCNC: 742 PG/ML (ref 211–911)

## 2023-12-07 DIAGNOSIS — G89.29 CHRONIC BILATERAL LOW BACK PAIN WITHOUT SCIATICA: ICD-10-CM

## 2023-12-07 DIAGNOSIS — M54.50 CHRONIC BILATERAL LOW BACK PAIN WITHOUT SCIATICA: ICD-10-CM

## 2023-12-07 NOTE — TELEPHONE ENCOUNTER
Requested Prescriptions     Pending Prescriptions Disp Refills    gabapentin (Neurontin) 100 mg capsule [Pharmacy Med Name: gabapentin 100 mg capsule] 120 capsule 0     Sig: TAKE 2 CAPSULE BY MOUTH 2 TIMES A DAY

## 2023-12-08 RX ORDER — GABAPENTIN 100 MG/1
CAPSULE ORAL
Qty: 120 CAPSULE | Refills: 0 | Status: SHIPPED | OUTPATIENT
Start: 2023-12-08 | End: 2024-01-19

## 2023-12-22 ENCOUNTER — APPOINTMENT (OUTPATIENT)
Dept: UROLOGY | Facility: CLINIC | Age: 83
End: 2023-12-22
Payer: MEDICARE

## 2024-01-01 ENCOUNTER — APPOINTMENT (OUTPATIENT)
Dept: RADIOLOGY | Facility: HOSPITAL | Age: 84
DRG: 064 | End: 2024-01-01
Payer: MEDICARE

## 2024-01-01 ENCOUNTER — HOSPITAL ENCOUNTER (INPATIENT)
Facility: HOSPITAL | Age: 84
LOS: 1 days | End: 2024-07-01
Attending: INTERNAL MEDICINE | Admitting: INTERNAL MEDICINE
Payer: OTHER MISCELLANEOUS

## 2024-01-01 ENCOUNTER — APPOINTMENT (OUTPATIENT)
Dept: CARDIOLOGY | Facility: HOSPITAL | Age: 84
DRG: 064 | End: 2024-01-01
Payer: MEDICARE

## 2024-01-01 VITALS
BODY MASS INDEX: 29.54 KG/M2 | SYSTOLIC BLOOD PRESSURE: 137 MMHG | DIASTOLIC BLOOD PRESSURE: 76 MMHG | OXYGEN SATURATION: 95 % | RESPIRATION RATE: 20 BRPM | HEART RATE: 66 BPM | WEIGHT: 211 LBS | TEMPERATURE: 97.9 F | HEIGHT: 71 IN

## 2024-01-01 PROCEDURE — 80069 RENAL FUNCTION PANEL: CPT

## 2024-01-01 PROCEDURE — 93005 ELECTROCARDIOGRAM TRACING: CPT

## 2024-01-01 PROCEDURE — 82947 ASSAY GLUCOSE BLOOD QUANT: CPT | Mod: 91

## 2024-01-01 PROCEDURE — 1250000001 HC HOSPICE SEMI-PRIVATE ROOM DAILY

## 2024-01-01 PROCEDURE — 71045 X-RAY EXAM CHEST 1 VIEW: CPT

## 2024-01-01 PROCEDURE — 2500000005 HC RX 250 GENERAL PHARMACY W/O HCPCS

## 2024-01-01 PROCEDURE — 2500000005 HC RX 250 GENERAL PHARMACY W/O HCPCS: Performed by: INTERNAL MEDICINE

## 2024-01-01 PROCEDURE — 70450 CT HEAD/BRAIN W/O DYE: CPT

## 2024-01-01 PROCEDURE — 83735 ASSAY OF MAGNESIUM: CPT

## 2024-01-01 PROCEDURE — 70544 MR ANGIOGRAPHY HEAD W/O DYE: CPT

## 2024-01-01 PROCEDURE — 70547 MR ANGIOGRAPHY NECK W/O DYE: CPT

## 2024-01-01 PROCEDURE — 80053 COMPREHEN METABOLIC PANEL: CPT | Performed by: PHYSICIAN ASSISTANT

## 2024-01-01 PROCEDURE — 82947 ASSAY GLUCOSE BLOOD QUANT: CPT

## 2024-01-01 PROCEDURE — 85730 THROMBOPLASTIN TIME PARTIAL: CPT | Performed by: PHYSICIAN ASSISTANT

## 2024-01-01 PROCEDURE — 85027 COMPLETE CBC AUTOMATED: CPT

## 2024-01-01 PROCEDURE — 83880 ASSAY OF NATRIURETIC PEPTIDE: CPT

## 2024-01-01 PROCEDURE — 70551 MRI BRAIN STEM W/O DYE: CPT

## 2024-01-01 PROCEDURE — 80061 LIPID PANEL: CPT

## 2024-01-01 PROCEDURE — 99239 HOSP IP/OBS DSCHRG MGMT >30: CPT

## 2024-01-01 PROCEDURE — 2500000004 HC RX 250 GENERAL PHARMACY W/ HCPCS (ALT 636 FOR OP/ED)

## 2024-01-01 PROCEDURE — 85610 PROTHROMBIN TIME: CPT | Performed by: PHYSICIAN ASSISTANT

## 2024-01-01 PROCEDURE — 84145 PROCALCITONIN (PCT): CPT | Mod: GEALAB

## 2024-01-01 PROCEDURE — 84484 ASSAY OF TROPONIN QUANT: CPT | Performed by: PHYSICIAN ASSISTANT

## 2024-01-01 PROCEDURE — 85025 COMPLETE CBC W/AUTO DIFF WBC: CPT | Performed by: PHYSICIAN ASSISTANT

## 2024-01-01 PROCEDURE — 71250 CT THORAX DX C-: CPT

## 2024-01-01 RX ORDER — GLYCOPYRROLATE 0.2 MG/ML
0.2 INJECTION INTRAMUSCULAR; INTRAVENOUS EVERY 4 HOURS PRN
Status: DISCONTINUED | OUTPATIENT
Start: 2024-01-01 | End: 2024-01-01 | Stop reason: HOSPADM

## 2024-01-01 RX ORDER — HYDROMORPHONE HYDROCHLORIDE 2 MG/1
2 TABLET ORAL
Status: DISCONTINUED | OUTPATIENT
Start: 2024-01-01 | End: 2024-01-01

## 2024-01-01 RX ORDER — LORAZEPAM 2 MG/ML
1 INJECTION INTRAMUSCULAR 4 TIMES DAILY PRN
Status: DISCONTINUED | OUTPATIENT
Start: 2024-01-01 | End: 2024-01-01 | Stop reason: HOSPADM

## 2024-01-01 RX ORDER — HYDROMORPHONE HCL/0.9% NACL/PF 15 MG/30ML
PATIENT CONTROLLED ANALGESIA SYRINGE INTRAVENOUS CONTINUOUS
Status: DISCONTINUED | OUTPATIENT
Start: 2024-01-01 | End: 2024-01-01 | Stop reason: HOSPADM

## 2024-01-01 RX ORDER — MORPHINE SULFATE IN 0.9 % NACL 30 MG/30ML
PATIENT CONTROLLED ANALGESIA SYRINGE INTRAVENOUS CONTINUOUS
Status: DISCONTINUED | OUTPATIENT
Start: 2024-01-01 | End: 2024-01-01

## 2024-01-01 SDOH — SOCIAL STABILITY: SOCIAL INSECURITY: ARE THERE ANY APPARENT SIGNS OF INJURIES/BEHAVIORS THAT COULD BE RELATED TO ABUSE/NEGLECT?: NO

## 2024-01-01 SDOH — SOCIAL STABILITY: SOCIAL INSECURITY: ABUSE: ADULT

## 2024-01-01 SDOH — SOCIAL STABILITY: SOCIAL INSECURITY: DO YOU FEEL UNSAFE GOING BACK TO THE PLACE WHERE YOU ARE LIVING?: NO

## 2024-01-01 SDOH — SOCIAL STABILITY: SOCIAL INSECURITY: HAVE YOU HAD THOUGHTS OF HARMING ANYONE ELSE?: NO

## 2024-01-01 SDOH — SOCIAL STABILITY: SOCIAL INSECURITY: DOES ANYONE TRY TO KEEP YOU FROM HAVING/CONTACTING OTHER FRIENDS OR DOING THINGS OUTSIDE YOUR HOME?: NO

## 2024-01-01 SDOH — SOCIAL STABILITY: SOCIAL INSECURITY: HAVE YOU HAD ANY THOUGHTS OF HARMING ANYONE ELSE?: NO

## 2024-01-01 SDOH — SOCIAL STABILITY: SOCIAL INSECURITY: HAS ANYONE EVER THREATENED TO HURT YOUR FAMILY OR YOUR PETS?: NO

## 2024-01-01 SDOH — SOCIAL STABILITY: SOCIAL INSECURITY: WERE YOU ABLE TO COMPLETE ALL THE BEHAVIORAL HEALTH SCREENINGS?: YES

## 2024-01-01 SDOH — SOCIAL STABILITY: SOCIAL INSECURITY: DO YOU FEEL ANYONE HAS EXPLOITED OR TAKEN ADVANTAGE OF YOU FINANCIALLY OR OF YOUR PERSONAL PROPERTY?: NO

## 2024-01-01 SDOH — SOCIAL STABILITY: SOCIAL INSECURITY: ARE YOU OR HAVE YOU BEEN THREATENED OR ABUSED PHYSICALLY, EMOTIONALLY, OR SEXUALLY BY ANYONE?: NO

## 2024-01-01 ASSESSMENT — LIFESTYLE VARIABLES
HOW OFTEN DO YOU HAVE A DRINK CONTAINING ALCOHOL: NEVER
AUDIT-C TOTAL SCORE: 0
SKIP TO QUESTIONS 9-10: 1
HOW OFTEN DO YOU HAVE 6 OR MORE DRINKS ON ONE OCCASION: NEVER
HOW MANY STANDARD DRINKS CONTAINING ALCOHOL DO YOU HAVE ON A TYPICAL DAY: PATIENT DOES NOT DRINK
AUDIT-C TOTAL SCORE: 0

## 2024-01-01 ASSESSMENT — PAIN SCALES - PAIN ASSESSMENT IN ADVANCED DEMENTIA (PAINAD)
FACIALEXPRESSION: SMILING OR INEXPRESSIVE
BODYLANGUAGE: RELAXED
CONSOLABILITY: NO NEED TO CONSOLE
TOTALSCORE: 0
BREATHING: NORMAL

## 2024-01-01 ASSESSMENT — COGNITIVE AND FUNCTIONAL STATUS - GENERAL
DRESSING REGULAR LOWER BODY CLOTHING: TOTAL
TURNING FROM BACK TO SIDE WHILE IN FLAT BAD: TOTAL
MOVING TO AND FROM BED TO CHAIR: TOTAL
WALKING IN HOSPITAL ROOM: TOTAL
MOBILITY SCORE: 7
DAILY ACTIVITIY SCORE: 8
PATIENT BASELINE BEDBOUND: NO
DRESSING REGULAR UPPER BODY CLOTHING: TOTAL
STANDING UP FROM CHAIR USING ARMS: TOTAL
HELP NEEDED FOR BATHING: TOTAL
CLIMB 3 TO 5 STEPS WITH RAILING: A LOT
EATING MEALS: A LOT
TOILETING: TOTAL
MOVING FROM LYING ON BACK TO SITTING ON SIDE OF FLAT BED WITH BEDRAILS: TOTAL
PERSONAL GROOMING: A LOT

## 2024-01-01 ASSESSMENT — COLUMBIA-SUICIDE SEVERITY RATING SCALE - C-SSRS
1. IN THE PAST MONTH, HAVE YOU WISHED YOU WERE DEAD OR WISHED YOU COULD GO TO SLEEP AND NOT WAKE UP?: NO
6. HAVE YOU EVER DONE ANYTHING, STARTED TO DO ANYTHING, OR PREPARED TO DO ANYTHING TO END YOUR LIFE?: NO
2. HAVE YOU ACTUALLY HAD ANY THOUGHTS OF KILLING YOURSELF?: NO

## 2024-01-01 ASSESSMENT — PATIENT HEALTH QUESTIONNAIRE - PHQ9
SUM OF ALL RESPONSES TO PHQ9 QUESTIONS 1 & 2: 0
2. FEELING DOWN, DEPRESSED OR HOPELESS: NOT AT ALL
1. LITTLE INTEREST OR PLEASURE IN DOING THINGS: NOT AT ALL

## 2024-01-01 ASSESSMENT — ACTIVITIES OF DAILY LIVING (ADL)
DRESSING YOURSELF: NEEDS ASSISTANCE
GROOMING: NEEDS ASSISTANCE
ADEQUATE_TO_COMPLETE_ADL: YES
TOILETING: NEEDS ASSISTANCE
HEARING - RIGHT EAR: FUNCTIONAL
JUDGMENT_ADEQUATE_SAFELY_COMPLETE_DAILY_ACTIVITIES: YES
BATHING: NEEDS ASSISTANCE
LACK_OF_TRANSPORTATION: NO
HEARING - LEFT EAR: FUNCTIONAL
PATIENT'S MEMORY ADEQUATE TO SAFELY COMPLETE DAILY ACTIVITIES?: YES
ASSISTIVE_DEVICE: CANE;WALKER
FEEDING YOURSELF: NEEDS ASSISTANCE
WALKS IN HOME: NEEDS ASSISTANCE

## 2024-01-01 ASSESSMENT — PAIN SCALES - GENERAL: PAINLEVEL_OUTOF10: 0 - NO PAIN

## 2024-01-01 ASSESSMENT — ENCOUNTER SYMPTOMS: CONFUSION: 1

## 2024-01-15 ENCOUNTER — APPOINTMENT (OUTPATIENT)
Dept: UROLOGY | Facility: CLINIC | Age: 84
End: 2024-01-15
Payer: MEDICARE

## 2024-01-17 DIAGNOSIS — R26.0 ATAXIC GAIT: ICD-10-CM

## 2024-01-17 DIAGNOSIS — M19.90 ARTHRITIS: ICD-10-CM

## 2024-01-17 NOTE — PROGRESS NOTES
Orders Placed This Encounter   Procedures    Disability Placard     Order Specific Question:   Reason     Answer:   Ataxic Gait / Arthritis     Order Specific Question:   Duration     Answer:   5 Years

## 2024-01-19 DIAGNOSIS — E78.00 PURE HYPERCHOLESTEROLEMIA: Primary | ICD-10-CM

## 2024-01-19 DIAGNOSIS — G89.29 CHRONIC BILATERAL LOW BACK PAIN WITHOUT SCIATICA: ICD-10-CM

## 2024-01-19 DIAGNOSIS — M54.50 CHRONIC BILATERAL LOW BACK PAIN WITHOUT SCIATICA: ICD-10-CM

## 2024-01-19 RX ORDER — GABAPENTIN 100 MG/1
CAPSULE ORAL
Qty: 120 CAPSULE | Refills: 2 | Status: SHIPPED | OUTPATIENT
Start: 2024-01-19 | End: 2024-05-14

## 2024-01-19 RX ORDER — SIMVASTATIN 20 MG/1
20 TABLET, FILM COATED ORAL NIGHTLY
Qty: 90 TABLET | Refills: 2 | Status: SHIPPED | OUTPATIENT
Start: 2024-01-19 | End: 2024-05-28 | Stop reason: HOSPADM

## 2024-02-05 DIAGNOSIS — E11.40 TYPE 2 DIABETES MELLITUS WITH DIABETIC NEUROPATHY, WITH LONG-TERM CURRENT USE OF INSULIN (MULTI): Primary | ICD-10-CM

## 2024-02-05 DIAGNOSIS — Z79.4 TYPE 2 DIABETES MELLITUS WITH DIABETIC NEUROPATHY, WITH LONG-TERM CURRENT USE OF INSULIN (MULTI): Primary | ICD-10-CM

## 2024-02-05 RX ORDER — INSULIN LISPRO 100 [IU]/ML
INJECTION, SOLUTION INTRAVENOUS; SUBCUTANEOUS
Qty: 15 ML | Refills: 3 | Status: SHIPPED | OUTPATIENT
Start: 2024-02-05

## 2024-02-05 RX ORDER — PEN NEEDLE, DIABETIC 32GX 5/32"
NEEDLE, DISPOSABLE MISCELLANEOUS
Qty: 400 EACH | Refills: 3 | Status: SHIPPED | OUTPATIENT
Start: 2024-02-05

## 2024-02-25 DIAGNOSIS — K21.9 GASTROESOPHAGEAL REFLUX DISEASE, UNSPECIFIED WHETHER ESOPHAGITIS PRESENT: Primary | ICD-10-CM

## 2024-02-26 RX ORDER — OMEPRAZOLE 20 MG/1
20 CAPSULE, DELAYED RELEASE ORAL DAILY
Qty: 90 CAPSULE | Refills: 3 | Status: SHIPPED | OUTPATIENT
Start: 2024-02-26

## 2024-03-07 DIAGNOSIS — N40.1 BENIGN PROSTATIC HYPERPLASIA WITH LOWER URINARY TRACT SYMPTOMS, SYMPTOM DETAILS UNSPECIFIED: ICD-10-CM

## 2024-03-07 RX ORDER — TAMSULOSIN HYDROCHLORIDE 0.4 MG/1
0.4 CAPSULE ORAL NIGHTLY
Qty: 30 CAPSULE | Refills: 11 | Status: SHIPPED | OUTPATIENT
Start: 2024-03-07 | End: 2025-03-07

## 2024-04-08 DIAGNOSIS — I10 BENIGN ESSENTIAL HYPERTENSION: Primary | ICD-10-CM

## 2024-04-08 RX ORDER — LOSARTAN POTASSIUM 50 MG/1
50 TABLET ORAL DAILY
Qty: 90 TABLET | Refills: 3 | Status: SHIPPED | OUTPATIENT
Start: 2024-04-08 | End: 2024-05-28 | Stop reason: HOSPADM

## 2024-05-10 ENCOUNTER — OFFICE VISIT (OUTPATIENT)
Dept: UROLOGY | Facility: CLINIC | Age: 84
End: 2024-05-10
Payer: MEDICARE

## 2024-05-10 DIAGNOSIS — N40.1 BENIGN PROSTATIC HYPERPLASIA WITH URINARY FREQUENCY: Primary | ICD-10-CM

## 2024-05-10 DIAGNOSIS — R35.0 BENIGN PROSTATIC HYPERPLASIA WITH URINARY FREQUENCY: Primary | ICD-10-CM

## 2024-05-10 DIAGNOSIS — N32.81 OVERACTIVE BLADDER: ICD-10-CM

## 2024-05-10 PROCEDURE — 1157F ADVNC CARE PLAN IN RCRD: CPT | Performed by: STUDENT IN AN ORGANIZED HEALTH CARE EDUCATION/TRAINING PROGRAM

## 2024-05-10 PROCEDURE — 99214 OFFICE O/P EST MOD 30 MIN: CPT | Performed by: STUDENT IN AN ORGANIZED HEALTH CARE EDUCATION/TRAINING PROGRAM

## 2024-05-10 PROCEDURE — G2211 COMPLEX E/M VISIT ADD ON: HCPCS | Performed by: STUDENT IN AN ORGANIZED HEALTH CARE EDUCATION/TRAINING PROGRAM

## 2024-05-10 RX ORDER — MIRABEGRON 25 MG/1
25 TABLET, FILM COATED, EXTENDED RELEASE ORAL DAILY
Qty: 42 TABLET | Refills: 0 | COMMUNITY
Start: 2024-05-10 | End: 2024-06-21

## 2024-05-10 NOTE — PROGRESS NOTES
Subjective   Patient ID: Kevin Donato is a 84 y.o. male.    HPI  85 yo diabetic male with CKD 3, F/U BPH.      He wears a depends at night. He has urinary frequency. Good flow.      He is only taking single dose tamsulosin daily. He stopped the oxybutynin due to dry mouth. Has not taken double dose.      Review of Systems    Objective   Physical Exam    Assessment/Plan   85 yo diabetic male with CKD 3, F/U BPH.      He wears a depends at night. He has urinary frequency. Good flow. The storage irritative symptoms are not controlled with single dose tamsulosin. Has not taken double dose. Instructed to make this change.     Will try Myrbetriq 25 mg. We discussed risks, benefits, alternatives and side effects. He agrees to try.     Plan:  Make tamsulosin 0.4  mg 2 pills daily at bedtime.  Start Myrbetriq 25 mg 1 pill daily, samples provided, trial for 1 month and call with progress. Will send Rx if beneficial.   FUV 6 weeks.     Diagnoses and all orders for this visit:  Benign prostatic hyperplasia with urinary frequency  -     mirabegron (Myrbetriq) 25 mg tablet extended release 24 hr 24 hr tablet; Take 1 tablet (25 mg) by mouth once daily. [id number]  12/2025  Overactive bladder      Scribe Attestation  By signing my name below, IRosita, Scribdayami attest that this documentation has been prepared under the direction and in the presence of Margie Solitario MD.

## 2024-05-13 DIAGNOSIS — G89.29 CHRONIC BILATERAL LOW BACK PAIN WITHOUT SCIATICA: ICD-10-CM

## 2024-05-13 DIAGNOSIS — M54.50 CHRONIC BILATERAL LOW BACK PAIN WITHOUT SCIATICA: ICD-10-CM

## 2024-05-14 RX ORDER — GABAPENTIN 100 MG/1
200 CAPSULE ORAL 2 TIMES DAILY
Qty: 120 CAPSULE | Refills: 2 | Status: SHIPPED | OUTPATIENT
Start: 2024-05-14

## 2024-05-15 ENCOUNTER — OFFICE VISIT (OUTPATIENT)
Dept: ENDOCRINOLOGY | Facility: CLINIC | Age: 84
End: 2024-05-15
Payer: MEDICARE

## 2024-05-15 VITALS
HEART RATE: 66 BPM | BODY MASS INDEX: 29.27 KG/M2 | SYSTOLIC BLOOD PRESSURE: 129 MMHG | DIASTOLIC BLOOD PRESSURE: 69 MMHG | WEIGHT: 204 LBS

## 2024-05-15 DIAGNOSIS — Z79.4 TYPE 2 DIABETES MELLITUS WITHOUT COMPLICATION, WITH LONG-TERM CURRENT USE OF INSULIN (MULTI): Primary | ICD-10-CM

## 2024-05-15 DIAGNOSIS — E11.9 TYPE 2 DIABETES MELLITUS WITHOUT COMPLICATION, WITH LONG-TERM CURRENT USE OF INSULIN (MULTI): Primary | ICD-10-CM

## 2024-05-15 LAB — POC HEMOGLOBIN A1C: 8.3 % (ref 4.2–6.5)

## 2024-05-15 PROCEDURE — 1159F MED LIST DOCD IN RCRD: CPT | Performed by: INTERNAL MEDICINE

## 2024-05-15 PROCEDURE — 3078F DIAST BP <80 MM HG: CPT | Performed by: INTERNAL MEDICINE

## 2024-05-15 PROCEDURE — 1160F RVW MEDS BY RX/DR IN RCRD: CPT | Performed by: INTERNAL MEDICINE

## 2024-05-15 PROCEDURE — 1157F ADVNC CARE PLAN IN RCRD: CPT | Performed by: INTERNAL MEDICINE

## 2024-05-15 PROCEDURE — 99214 OFFICE O/P EST MOD 30 MIN: CPT | Performed by: INTERNAL MEDICINE

## 2024-05-15 PROCEDURE — 3074F SYST BP LT 130 MM HG: CPT | Performed by: INTERNAL MEDICINE

## 2024-05-15 PROCEDURE — 83036 HEMOGLOBIN GLYCOSYLATED A1C: CPT | Performed by: INTERNAL MEDICINE

## 2024-05-15 ASSESSMENT — ENCOUNTER SYMPTOMS
POLYDIPSIA: 0
WEAKNESS: 1
UNEXPECTED WEIGHT CHANGE: 1
HEADACHES: 0
DIZZINESS: 1
APPETITE CHANGE: 0
ABDOMINAL PAIN: 0
CONSTIPATION: 0
SHORTNESS OF BREATH: 0
DIARRHEA: 0
VOMITING: 0
COUGH: 0
BACK PAIN: 1
NERVOUS/ANXIOUS: 0
FEVER: 0
SORE THROAT: 0
FREQUENCY: 0
ARTHRALGIAS: 0
NAUSEA: 0

## 2024-05-15 NOTE — PATIENT INSTRUCTIONS
RECOMMENDATIONS  Lantus 38 units every morning    Humalog three times daily before meals  Glucose  150-200--3 units  201-250--4 units  251-300--5 units  301-350--6 units  351-400--7 units    Follow up 3-4 months  Review Vahe at all appointment  A1c at next appointment if not already done.

## 2024-05-15 NOTE — PROGRESS NOTES
History Of Present Illness  Kevin Donato is a 84 y.o. male     Duration of type 2 diabetes mellitus:  21 years   History of pancreatitis (2018) attributed to Victoza     Lantus 35 units every morning     Humalog three times daily before meals  Glucose  150-200--3 units  201-250--4 units  251-300--5 units  301-350--6 units  351-400--7 units     FreeStyle Vahe 2  Patient is testing glucose 288 times daily  Records reviewed, on file     Last eye exam:  December 2023    Past Medical History  He has a past medical history of Calculus of bile duct without cholangitis or cholecystitis without obstruction (02/21/2022), Fever, unspecified (01/10/2022), Generalized abdominal pain (01/10/2022), Hiccough (08/31/2022), 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), Otalgia, right ear (04/28/2016), Other cholangitis (CMS-HCC) (02/14/2022), Other iron deficiency anemias (12/16/2016), Other skin changes (10/13/2017), Pain in right shoulder (10/07/2016), Parkinson's disease (Multi) (03/23/2020), Personal history of diseases of the skin and subcutaneous tissue (02/28/2019), Personal history of other diseases of the circulatory system, Personal history of other diseases of the digestive system (08/10/2018), Personal history of other diseases of the musculoskeletal system and connective tissue (02/26/2020), Personal history of other diseases of the nervous system and sense organs (05/21/2018), Personal history of other diseases of the nervous system and sense organs (04/07/2015), Personal history of other diseases of the respiratory system (01/10/2019), Personal history of other diseases of urinary system (11/12/2014), Personal history of other specified conditions (10/16/2017), Personal history of other specified conditions (01/11/2022), and Type 2 diabetes mellitus without complications (Multi) (11/29/2022).    Surgical History  He has a past surgical history that includes Hernia repair (02/14/2017);  "Nose surgery (02/14/2017); and Other surgical history (05/24/2022).     Social History  He reports that he has never smoked. He has never used smokeless tobacco. He reports that he does not drink alcohol and does not use drugs.    Family History  Family History   Problem Relation Name Age of Onset    Diabetes Mother      Colon cancer Sister      Leukemia Sister         Medications  Current Outpatient Medications   Medication Instructions    acetaminophen (Tylenol) 325 mg tablet 2 tablets, oral, Every 4 hours PRN    blood sugar diagnostic (Contour Test Strips) strip Test blood sugar twice a day    blood sugar diagnostic (True Metrix Glucose Test Strip) strip Test 4 times daily due to hypoglycemia    cholecalciferol (Vitamin D-3) 50 mcg (2,000 unit) capsule 1 capsule, oral, Daily    cyanocobalamin, vitamin B-12, (Vitamin B-12) 1,000 mcg tablet extended release 2 tablets, oral, Daily, As directed    gabapentin (NEURONTIN) 200 mg, oral, 2 times daily    HumaLOG KwikPen Insulin 100 unit/mL injection INJECT 3 to 7 UNITS 3 times daily    insulin glargine (LANTUS SOLOSTAR U-100 INSULIN) 35 Units, subcutaneous, Nightly, Take as directed per insulin instructions.    lancets 26 gauge misc miscellaneous, Easy touch lancets 26G/twist MISC---check sugar twice daily    losartan (COZAAR) 50 mg, oral, Daily    mirabegron (MYRBETRIQ) 25 mg, oral, Daily, [id number]<BR>12/2025    multivitamin tablet oral    omega 3-dha-epa-fish oil 1,200 (144-216) mg capsule oral    omeprazole (PRILOSEC) 20 mg, oral, Daily    pen needle, diabetic (Sure Comfort Pen Needle) 32 gauge x 5/32\" needle Take as directed. Use daily for lantus and 3times a day for humalog coverage    QuickVue At-Home COVID-19 Test kit AS DIRECTED    simvastatin (ZOCOR) 20 mg, oral, Nightly    tamsulosin (FLOMAX) 0.4 mg, oral, Nightly    triamcinolone (Kenalog) 0.1 % cream Topical, 2 times daily, Apply to affected area 1-2 times daily as needed. Avoid face and groin. "       Allergies  Lisinopril    Review of Systems   Constitutional:  Positive for unexpected weight change (gain). Negative for appetite change and fever.   HENT:  Positive for dental problem. Negative for sore throat.         Denies dry mouth   Eyes:  Negative for visual disturbance.   Respiratory:  Negative for cough and shortness of breath.    Cardiovascular:  Negative for chest pain.   Gastrointestinal:  Negative for abdominal pain, constipation, diarrhea, nausea and vomiting.   Endocrine: Negative for polydipsia and polyuria.   Genitourinary:  Negative for frequency.        Prostatism   Musculoskeletal:  Positive for back pain. Negative for arthralgias.   Skin:  Negative for rash.   Neurological:  Positive for dizziness and weakness. Negative for headaches.   Psychiatric/Behavioral:  The patient is not nervous/anxious.          Last Recorded Vitals  Blood pressure 129/69, pulse 66, weight 92.5 kg (204 lb).    Physical Exam  Constitutional:       General: He is not in acute distress.  HENT:      Head: Normocephalic.      Mouth/Throat:      Mouth: Mucous membranes are moist.   Eyes:      Extraocular Movements: Extraocular movements intact.   Neck:      Thyroid: No thyroid mass or thyromegaly.   Cardiovascular:      Pulses:           Radial pulses are 2+ on the right side and 2+ on the left side.        Dorsalis pedis pulses are 2+ on the right side and 2+ on the left side.      Comments: Trace pedal edema bilat  Feet:      Comments: Flat feet  Lymphadenopathy:      Cervical: No cervical adenopathy.   Neurological:      Mental Status: He is alert.      Motor: No tremor.   Psychiatric:         Mood and Affect: Affect normal.          Relevant Results  Glucose (mg/dL)   Date Value   11/29/2023 130 (H)   05/17/2023 116 (H)   09/26/2022 142 (H)   08/31/2022 180 (H)     POC HEMOGLOBIN A1c (%)   Date Value   11/15/2023 7.4 (A)     Hemoglobin A1C (%)   Date Value   05/17/2023 7.4 (A)   09/26/2022 7.7 (A)   03/23/2022 7.9  (A)     Bicarbonate (mmol/L)   Date Value   11/29/2023 26   05/17/2023 26   09/26/2022 28   08/31/2022 27     Urea Nitrogen (mg/dL)   Date Value   11/29/2023 35 (H)   05/17/2023 26 (H)   09/26/2022 24 (H)   08/31/2022 24 (H)     Creatinine (mg/dL)   Date Value   11/29/2023 1.72 (H)   05/17/2023 1.78 (H)   09/26/2022 1.64 (H)   08/31/2022 1.63 (H)     Lab Results   Component Value Date    CHOL 150 11/29/2023    CHOL 133 05/17/2023    CHOL 133 09/26/2022     Lab Results   Component Value Date    HDL 43.5 11/29/2023    HDL 47.2 05/17/2023    HDL 40.7 09/26/2022     Lab Results   Component Value Date    LDLCALC 81 11/29/2023     Lab Results   Component Value Date    TRIG 130 11/29/2023    TRIG 100 05/17/2023    TRIG 91 09/26/2022     Lab Results   Component Value Date    TSH 1.45 11/29/2023       IMPRESSION  TYPE 2 DIABETES MELLITUS   LONG TERM CURRENT INSULIN USE  Rapid A1c 8.3%  General hyperglycemia per CGM    RECOMMENDATIONS  Lantus 38 units every morning    Humalog three times daily before meals  Glucose  150-200--3 units  201-250--4 units  251-300--5 units  301-350--6 units  351-400--7 units    Follow up 3-4 months  Review Vahe at all appointment  A1c at next appointment if not already done.

## 2024-05-15 NOTE — LETTER
May 16, 2024     Yarely Schroeder DO  [address]  [address] [address]  [facility] [address]    Patient: Kevin Donato   YOB: 1940   Date of Visit: 5/15/2024       Dear Dr. Yarely Schroeder DO:    Thank you for referring Kevin Donato to me for evaluation. Below are my notes for this consultation.  If you have questions, please do not hesitate to call me. I look forward to following your patient along with you.       Sincerely,     Juan C Denney MD      CC: No Recipients  ______________________________________________________________________________________    History Of Present Illness  Kevin Donato is a 84 y.o. male     Duration of type 2 diabetes mellitus:  21 years   History of pancreatitis (2018) attributed to Victoza     Lantus 35 units every morning     Humalog three times daily before meals  Glucose  150-200--3 units  201-250--4 units  251-300--5 units  301-350--6 units  351-400--7 units     FreeStyle Vahe 2  Patient is testing glucose 288 times daily  Records reviewed, on file     Last eye exam:  December 2023    Past Medical History  He has a past medical history of Calculus of bile duct without cholangitis or cholecystitis without obstruction (02/21/2022), Fever, unspecified (01/10/2022), Generalized abdominal pain (01/10/2022), Hiccough (08/31/2022), 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), Otalgia, right ear (04/28/2016), Other cholangitis (CMS-HCC) (02/14/2022), Other iron deficiency anemias (12/16/2016), Other skin changes (10/13/2017), Pain in right shoulder (10/07/2016), Parkinson's disease (Multi) (03/23/2020), Personal history of diseases of the skin and subcutaneous tissue (02/28/2019), Personal history of other diseases of the circulatory system, Personal history of other diseases of the digestive system (08/10/2018), Personal history of other diseases of the musculoskeletal system and connective tissue (02/26/2020), Personal history of other  diseases of the nervous system and sense organs (05/21/2018), Personal history of other diseases of the nervous system and sense organs (04/07/2015), Personal history of other diseases of the respiratory system (01/10/2019), Personal history of other diseases of urinary system (11/12/2014), Personal history of other specified conditions (10/16/2017), Personal history of other specified conditions (01/11/2022), and Type 2 diabetes mellitus without complications (Multi) (11/29/2022).    Surgical History  He has a past surgical history that includes Hernia repair (02/14/2017); Nose surgery (02/14/2017); and Other surgical history (05/24/2022).     Social History  He reports that he has never smoked. He has never used smokeless tobacco. He reports that he does not drink alcohol and does not use drugs.    Family History  Family History   Problem Relation Name Age of Onset   • Diabetes Mother     • Colon cancer Sister     • Leukemia Sister         Medications  Current Outpatient Medications   Medication Instructions   • acetaminophen (Tylenol) 325 mg tablet 2 tablets, oral, Every 4 hours PRN   • blood sugar diagnostic (Contour Test Strips) strip Test blood sugar twice a day   • blood sugar diagnostic (True Metrix Glucose Test Strip) strip Test 4 times daily due to hypoglycemia   • cholecalciferol (Vitamin D-3) 50 mcg (2,000 unit) capsule 1 capsule, oral, Daily   • cyanocobalamin, vitamin B-12, (Vitamin B-12) 1,000 mcg tablet extended release 2 tablets, oral, Daily, As directed   • gabapentin (NEURONTIN) 200 mg, oral, 2 times daily   • HumaLOG KwikPen Insulin 100 unit/mL injection INJECT 3 to 7 UNITS 3 times daily   • insulin glargine (LANTUS SOLOSTAR U-100 INSULIN) 35 Units, subcutaneous, Nightly, Take as directed per insulin instructions.   • lancets 26 gauge misc miscellaneous, Easy touch lancets 26G/twist MISC---check sugar twice daily   • losartan (COZAAR) 50 mg, oral, Daily   • mirabegron (MYRBETRIQ) 25 mg, oral,  "Daily, [id number]<BR>12/2025   • multivitamin tablet oral   • omega 3-dha-epa-fish oil 1,200 (144-216) mg capsule oral   • omeprazole (PRILOSEC) 20 mg, oral, Daily   • pen needle, diabetic (Sure Comfort Pen Needle) 32 gauge x 5/32\" needle Take as directed. Use daily for lantus and 3times a day for humalog coverage   • QuickVue At-Home COVID-19 Test kit AS DIRECTED   • simvastatin (ZOCOR) 20 mg, oral, Nightly   • tamsulosin (FLOMAX) 0.4 mg, oral, Nightly   • triamcinolone (Kenalog) 0.1 % cream Topical, 2 times daily, Apply to affected area 1-2 times daily as needed. Avoid face and groin.       Allergies  Lisinopril    Review of Systems   Constitutional:  Positive for unexpected weight change (gain). Negative for appetite change and fever.   HENT:  Positive for dental problem. Negative for sore throat.         Denies dry mouth   Eyes:  Negative for visual disturbance.   Respiratory:  Negative for cough and shortness of breath.    Cardiovascular:  Negative for chest pain.   Gastrointestinal:  Negative for abdominal pain, constipation, diarrhea, nausea and vomiting.   Endocrine: Negative for polydipsia and polyuria.   Genitourinary:  Negative for frequency.        Prostatism   Musculoskeletal:  Positive for back pain. Negative for arthralgias.   Skin:  Negative for rash.   Neurological:  Positive for dizziness and weakness. Negative for headaches.   Psychiatric/Behavioral:  The patient is not nervous/anxious.          Last Recorded Vitals  Blood pressure 129/69, pulse 66, weight 92.5 kg (204 lb).    Physical Exam  Constitutional:       General: He is not in acute distress.  HENT:      Head: Normocephalic.      Mouth/Throat:      Mouth: Mucous membranes are moist.   Eyes:      Extraocular Movements: Extraocular movements intact.   Neck:      Thyroid: No thyroid mass or thyromegaly.   Cardiovascular:      Pulses:           Radial pulses are 2+ on the right side and 2+ on the left side.        Dorsalis pedis pulses are 2+ " on the right side and 2+ on the left side.      Comments: Trace pedal edema bilat  Feet:      Comments: Flat feet  Lymphadenopathy:      Cervical: No cervical adenopathy.   Neurological:      Mental Status: He is alert.      Motor: No tremor.   Psychiatric:         Mood and Affect: Affect normal.          Relevant Results  Glucose (mg/dL)   Date Value   11/29/2023 130 (H)   05/17/2023 116 (H)   09/26/2022 142 (H)   08/31/2022 180 (H)     POC HEMOGLOBIN A1c (%)   Date Value   11/15/2023 7.4 (A)     Hemoglobin A1C (%)   Date Value   05/17/2023 7.4 (A)   09/26/2022 7.7 (A)   03/23/2022 7.9 (A)     Bicarbonate (mmol/L)   Date Value   11/29/2023 26   05/17/2023 26   09/26/2022 28   08/31/2022 27     Urea Nitrogen (mg/dL)   Date Value   11/29/2023 35 (H)   05/17/2023 26 (H)   09/26/2022 24 (H)   08/31/2022 24 (H)     Creatinine (mg/dL)   Date Value   11/29/2023 1.72 (H)   05/17/2023 1.78 (H)   09/26/2022 1.64 (H)   08/31/2022 1.63 (H)     Lab Results   Component Value Date    CHOL 150 11/29/2023    CHOL 133 05/17/2023    CHOL 133 09/26/2022     Lab Results   Component Value Date    HDL 43.5 11/29/2023    HDL 47.2 05/17/2023    HDL 40.7 09/26/2022     Lab Results   Component Value Date    LDLCALC 81 11/29/2023     Lab Results   Component Value Date    TRIG 130 11/29/2023    TRIG 100 05/17/2023    TRIG 91 09/26/2022     Lab Results   Component Value Date    TSH 1.45 11/29/2023       IMPRESSION  TYPE 2 DIABETES MELLITUS   LONG TERM CURRENT INSULIN USE  Rapid A1c 8.3%  General hyperglycemia per CGM    RECOMMENDATIONS  Lantus 38 units every morning    Humalog three times daily before meals  Glucose  150-200--3 units  201-250--4 units  251-300--5 units  301-350--6 units  351-400--7 units    Follow up 3-4 months  Review Vahe at all appointment  A1c at next appointment if not already done.

## 2024-05-16 ENCOUNTER — OFFICE VISIT (OUTPATIENT)
Dept: PRIMARY CARE | Facility: CLINIC | Age: 84
End: 2024-05-16
Payer: MEDICARE

## 2024-05-16 VITALS
SYSTOLIC BLOOD PRESSURE: 157 MMHG | HEIGHT: 70 IN | HEART RATE: 61 BPM | BODY MASS INDEX: 29.2 KG/M2 | DIASTOLIC BLOOD PRESSURE: 90 MMHG | WEIGHT: 204 LBS

## 2024-05-16 DIAGNOSIS — I10 BENIGN ESSENTIAL HYPERTENSION: ICD-10-CM

## 2024-05-16 DIAGNOSIS — R41.89 COGNITIVE DECLINE: Primary | ICD-10-CM

## 2024-05-16 DIAGNOSIS — E78.5 DYSLIPIDEMIA, GOAL LDL BELOW 70: ICD-10-CM

## 2024-05-16 DIAGNOSIS — E53.8 VITAMIN B12 DEFICIENCY: ICD-10-CM

## 2024-05-16 PROCEDURE — 3080F DIAST BP >= 90 MM HG: CPT | Performed by: INTERNAL MEDICINE

## 2024-05-16 PROCEDURE — 3077F SYST BP >= 140 MM HG: CPT | Performed by: INTERNAL MEDICINE

## 2024-05-16 PROCEDURE — 1159F MED LIST DOCD IN RCRD: CPT | Performed by: INTERNAL MEDICINE

## 2024-05-16 PROCEDURE — 1124F ACP DISCUSS-NO DSCNMKR DOCD: CPT | Performed by: INTERNAL MEDICINE

## 2024-05-16 PROCEDURE — 1160F RVW MEDS BY RX/DR IN RCRD: CPT | Performed by: INTERNAL MEDICINE

## 2024-05-16 PROCEDURE — 99214 OFFICE O/P EST MOD 30 MIN: CPT | Performed by: INTERNAL MEDICINE

## 2024-05-16 PROCEDURE — 1157F ADVNC CARE PLAN IN RCRD: CPT | Performed by: INTERNAL MEDICINE

## 2024-05-16 ASSESSMENT — PATIENT HEALTH QUESTIONNAIRE - PHQ9
SUM OF ALL RESPONSES TO PHQ9 QUESTIONS 1 AND 2: 0
2. FEELING DOWN, DEPRESSED OR HOPELESS: NOT AT ALL
1. LITTLE INTEREST OR PLEASURE IN DOING THINGS: NOT AT ALL

## 2024-05-16 NOTE — PROGRESS NOTES
"Subjective   Patient ID: Kevin Donato is a 84 y.o. male who presents for 6 month follow up.    He saw urology, they increased flomax to 2 a day and added myrbetriq, has been on for 3 days and now wetting the bed, is getting much worse.     He went to get the mail, got woozy and fell into the lawn and was dehydrated, his dghtr came and made him drink water. No problem since, in general he does not get woozy feelings,   Some of his meds say can make him drowsy or dizziness.   Sometimes is harder for him to walk,   No LOC,   No palpitations,   He did feel spinny. Now is using the rollator  No injury with the fall.    No cp or pressure  Bowels are regular.   Felt more unbalanced    His insulin was increased from 35 to 38.     He has been having some confusion.   After a nap and it is dark out, he thinks he should be getting up form the morning and it is night.  He will think he is in [place], not in [place].            Review of Systems    Objective   /90   Pulse 61   Ht 1.778 m (5' 10\")   Wt 92.5 kg (204 lb)   BMI 29.27 kg/m²     Physical Exam  Constitutional:       Appearance: Normal appearance.   Neck:      Vascular: No carotid bruit.   Cardiovascular:      Rate and Rhythm: Normal rate and regular rhythm.      Heart sounds: Murmur heard.   Pulmonary:      Effort: Pulmonary effort is normal.      Breath sounds: Normal breath sounds.   Neurological:      Mental Status: He is alert.      Comments: Head pike negative  No pronator drift  Finger to nose intact  Pt less interactive than usual    MMSE25/30, missed date, R, O, Ball, flag          Assessment/Plan   Diagnoses and all orders for this visit:  Cognitive decline  Vitamin B12 deficiency  Benign essential hypertension  Dyslipidemia, goal LDL below 70         Patient Instructions   Worsening memory, falls and worsened urinary incontinence  Call [phone] to schedule ct head  Checking b12 level  We discussed symptoms of NPH    Get fasting labs, due to " insulin, can have light meal prior to labs    If labs good, recheck in 6months

## 2024-05-16 NOTE — PATIENT INSTRUCTIONS
Worsening memory, falls and worsened urinary incontinence  Call [phone] to schedule ct head  Checking b12 level  We discussed symptoms of NPH    Get fasting labs, due to insulin, can have light meal prior to labs    If labs good, recheck in 6months

## 2024-05-23 ENCOUNTER — APPOINTMENT (OUTPATIENT)
Dept: RADIOLOGY | Facility: HOSPITAL | Age: 84
DRG: 065 | End: 2024-05-23
Payer: MEDICARE

## 2024-05-23 ENCOUNTER — HOSPITAL ENCOUNTER (INPATIENT)
Facility: HOSPITAL | Age: 84
LOS: 3 days | Discharge: NURSING, REHAB OR LONG-TERM CARE | DRG: 065 | End: 2024-05-28
Attending: STUDENT IN AN ORGANIZED HEALTH CARE EDUCATION/TRAINING PROGRAM | Admitting: STUDENT IN AN ORGANIZED HEALTH CARE EDUCATION/TRAINING PROGRAM
Payer: MEDICARE

## 2024-05-23 ENCOUNTER — APPOINTMENT (OUTPATIENT)
Dept: CARDIOLOGY | Facility: HOSPITAL | Age: 84
DRG: 065 | End: 2024-05-23
Payer: MEDICARE

## 2024-05-23 DIAGNOSIS — K59.00 CONSTIPATION, UNSPECIFIED CONSTIPATION TYPE: ICD-10-CM

## 2024-05-23 DIAGNOSIS — G47.00 INSOMNIA, UNSPECIFIED TYPE: ICD-10-CM

## 2024-05-23 DIAGNOSIS — I63.9 CEREBROVASCULAR ACCIDENT (CVA), UNSPECIFIED MECHANISM (MULTI): ICD-10-CM

## 2024-05-23 DIAGNOSIS — E78.5 HYPERLIPIDEMIA, UNSPECIFIED HYPERLIPIDEMIA TYPE: ICD-10-CM

## 2024-05-23 DIAGNOSIS — I10 BENIGN ESSENTIAL HYPERTENSION: ICD-10-CM

## 2024-05-23 DIAGNOSIS — E11.40 TYPE 2 DIABETES MELLITUS WITH DIABETIC NEUROPATHY, UNSPECIFIED WHETHER LONG TERM INSULIN USE (MULTI): ICD-10-CM

## 2024-05-23 DIAGNOSIS — G45.9 TIA (TRANSIENT ISCHEMIC ATTACK): Primary | ICD-10-CM

## 2024-05-23 LAB
ALBUMIN SERPL BCP-MCNC: 4.1 G/DL (ref 3.4–5)
ALP SERPL-CCNC: 67 U/L (ref 33–136)
ALT SERPL W P-5'-P-CCNC: 7 U/L (ref 10–52)
ANION GAP SERPL CALC-SCNC: 13 MMOL/L (ref 10–20)
APTT PPP: 54 SECONDS (ref 27–38)
AST SERPL W P-5'-P-CCNC: 15 U/L (ref 9–39)
BASOPHILS # BLD AUTO: 0.05 X10*3/UL (ref 0–0.1)
BASOPHILS NFR BLD AUTO: 0.7 %
BILIRUB SERPL-MCNC: 0.9 MG/DL (ref 0–1.2)
BUN SERPL-MCNC: 22 MG/DL (ref 6–23)
CALCIUM SERPL-MCNC: 9.1 MG/DL (ref 8.6–10.3)
CARDIAC TROPONIN I PNL SERPL HS: 6 NG/L (ref 0–20)
CHLORIDE SERPL-SCNC: 105 MMOL/L (ref 98–107)
CHOLEST SERPL-MCNC: 147 MG/DL (ref 0–199)
CHOLESTEROL/HDL RATIO: 3.6
CO2 SERPL-SCNC: 25 MMOL/L (ref 21–32)
CREAT SERPL-MCNC: 1.42 MG/DL (ref 0.5–1.3)
EGFRCR SERPLBLD CKD-EPI 2021: 48 ML/MIN/1.73M*2
EOSINOPHIL # BLD AUTO: 0.29 X10*3/UL (ref 0–0.4)
EOSINOPHIL NFR BLD AUTO: 4.1 %
ERYTHROCYTE [DISTWIDTH] IN BLOOD BY AUTOMATED COUNT: 12.5 % (ref 11.5–14.5)
GLUCOSE BLD MANUAL STRIP-MCNC: 210 MG/DL (ref 74–99)
GLUCOSE BLD MANUAL STRIP-MCNC: 224 MG/DL (ref 74–99)
GLUCOSE SERPL-MCNC: 222 MG/DL (ref 74–99)
HCT VFR BLD AUTO: 39.1 % (ref 41–52)
HDLC SERPL-MCNC: 40.4 MG/DL
HGB BLD-MCNC: 13.3 G/DL (ref 13.5–17.5)
IMM GRANULOCYTES # BLD AUTO: 0.03 X10*3/UL (ref 0–0.5)
IMM GRANULOCYTES NFR BLD AUTO: 0.4 % (ref 0–0.9)
INR PPP: 1 (ref 0.9–1.1)
LDLC SERPL CALC-MCNC: 78 MG/DL
LYMPHOCYTES # BLD AUTO: 1.12 X10*3/UL (ref 0.8–3)
LYMPHOCYTES NFR BLD AUTO: 16 %
MCH RBC QN AUTO: 33.5 PG (ref 26–34)
MCHC RBC AUTO-ENTMCNC: 34 G/DL (ref 32–36)
MCV RBC AUTO: 99 FL (ref 80–100)
MONOCYTES # BLD AUTO: 0.48 X10*3/UL (ref 0.05–0.8)
MONOCYTES NFR BLD AUTO: 6.9 %
NEUTROPHILS # BLD AUTO: 5.02 X10*3/UL (ref 1.6–5.5)
NEUTROPHILS NFR BLD AUTO: 71.9 %
NON HDL CHOLESTEROL: 107 MG/DL (ref 0–149)
NRBC BLD-RTO: 0 /100 WBCS (ref 0–0)
PLATELET # BLD AUTO: 169 X10*3/UL (ref 150–450)
POTASSIUM SERPL-SCNC: 4.2 MMOL/L (ref 3.5–5.3)
PROT SERPL-MCNC: 6.6 G/DL (ref 6.4–8.2)
PROTHROMBIN TIME: 11.8 SECONDS (ref 9.8–12.8)
RBC # BLD AUTO: 3.97 X10*6/UL (ref 4.5–5.9)
SODIUM SERPL-SCNC: 139 MMOL/L (ref 136–145)
TRIGL SERPL-MCNC: 143 MG/DL (ref 0–149)
VLDL: 29 MG/DL (ref 0–40)
WBC # BLD AUTO: 7 X10*3/UL (ref 4.4–11.3)

## 2024-05-23 PROCEDURE — 85730 THROMBOPLASTIN TIME PARTIAL: CPT | Performed by: STUDENT IN AN ORGANIZED HEALTH CARE EDUCATION/TRAINING PROGRAM

## 2024-05-23 PROCEDURE — 94760 N-INVAS EAR/PLS OXIMETRY 1: CPT

## 2024-05-23 PROCEDURE — 85610 PROTHROMBIN TIME: CPT | Performed by: STUDENT IN AN ORGANIZED HEALTH CARE EDUCATION/TRAINING PROGRAM

## 2024-05-23 PROCEDURE — 70551 MRI BRAIN STEM W/O DYE: CPT | Performed by: STUDENT IN AN ORGANIZED HEALTH CARE EDUCATION/TRAINING PROGRAM

## 2024-05-23 PROCEDURE — 70450 CT HEAD/BRAIN W/O DYE: CPT | Performed by: RADIOLOGY

## 2024-05-23 PROCEDURE — 99285 EMERGENCY DEPT VISIT HI MDM: CPT | Mod: 25

## 2024-05-23 PROCEDURE — 70450 CT HEAD/BRAIN W/O DYE: CPT

## 2024-05-23 PROCEDURE — G0378 HOSPITAL OBSERVATION PER HR: HCPCS

## 2024-05-23 PROCEDURE — 2500000002 HC RX 250 W HCPCS SELF ADMINISTERED DRUGS (ALT 637 FOR MEDICARE OP, ALT 636 FOR OP/ED)

## 2024-05-23 PROCEDURE — 70547 MR ANGIOGRAPHY NECK W/O DYE: CPT

## 2024-05-23 PROCEDURE — 70551 MRI BRAIN STEM W/O DYE: CPT

## 2024-05-23 PROCEDURE — 82947 ASSAY GLUCOSE BLOOD QUANT: CPT

## 2024-05-23 PROCEDURE — 83036 HEMOGLOBIN GLYCOSYLATED A1C: CPT | Mod: GEALAB | Performed by: INTERNAL MEDICINE

## 2024-05-23 PROCEDURE — 80061 LIPID PANEL: CPT | Performed by: INTERNAL MEDICINE

## 2024-05-23 PROCEDURE — 70544 MR ANGIOGRAPHY HEAD W/O DYE: CPT

## 2024-05-23 PROCEDURE — 2500000001 HC RX 250 WO HCPCS SELF ADMINISTERED DRUGS (ALT 637 FOR MEDICARE OP)

## 2024-05-23 PROCEDURE — 84484 ASSAY OF TROPONIN QUANT: CPT | Performed by: STUDENT IN AN ORGANIZED HEALTH CARE EDUCATION/TRAINING PROGRAM

## 2024-05-23 PROCEDURE — 93005 ELECTROCARDIOGRAM TRACING: CPT

## 2024-05-23 PROCEDURE — 84075 ASSAY ALKALINE PHOSPHATASE: CPT | Performed by: STUDENT IN AN ORGANIZED HEALTH CARE EDUCATION/TRAINING PROGRAM

## 2024-05-23 PROCEDURE — 85025 COMPLETE CBC W/AUTO DIFF WBC: CPT | Performed by: STUDENT IN AN ORGANIZED HEALTH CARE EDUCATION/TRAINING PROGRAM

## 2024-05-23 PROCEDURE — 36415 COLL VENOUS BLD VENIPUNCTURE: CPT | Performed by: STUDENT IN AN ORGANIZED HEALTH CARE EDUCATION/TRAINING PROGRAM

## 2024-05-23 PROCEDURE — 2500000001 HC RX 250 WO HCPCS SELF ADMINISTERED DRUGS (ALT 637 FOR MEDICARE OP): Performed by: INTERNAL MEDICINE

## 2024-05-23 PROCEDURE — 82947 ASSAY GLUCOSE BLOOD QUANT: CPT | Mod: 91

## 2024-05-23 PROCEDURE — 70547 MR ANGIOGRAPHY NECK W/O DYE: CPT | Performed by: STUDENT IN AN ORGANIZED HEALTH CARE EDUCATION/TRAINING PROGRAM

## 2024-05-23 RX ORDER — INSULIN LISPRO 100 [IU]/ML
0-10 INJECTION, SOLUTION INTRAVENOUS; SUBCUTANEOUS
Status: DISCONTINUED | OUTPATIENT
Start: 2024-05-23 | End: 2024-05-28 | Stop reason: HOSPADM

## 2024-05-23 RX ORDER — ATORVASTATIN CALCIUM 80 MG/1
80 TABLET, FILM COATED ORAL NIGHTLY
Status: DISCONTINUED | OUTPATIENT
Start: 2024-05-24 | End: 2024-05-28 | Stop reason: HOSPADM

## 2024-05-23 RX ORDER — OXYBUTYNIN CHLORIDE 5 MG/1
5 TABLET ORAL 2 TIMES DAILY
Status: DISCONTINUED | OUTPATIENT
Start: 2024-05-23 | End: 2024-05-28 | Stop reason: HOSPADM

## 2024-05-23 RX ORDER — HEPARIN SODIUM 5000 [USP'U]/ML
5000 INJECTION, SOLUTION INTRAVENOUS; SUBCUTANEOUS EVERY 8 HOURS
Status: DISCONTINUED | OUTPATIENT
Start: 2024-05-23 | End: 2024-05-28 | Stop reason: HOSPADM

## 2024-05-23 RX ORDER — HEPARIN SODIUM 5000 [USP'U]/ML
5000 INJECTION, SOLUTION INTRAVENOUS; SUBCUTANEOUS EVERY 8 HOURS
Status: DISCONTINUED | OUTPATIENT
Start: 2024-05-23 | End: 2024-05-23

## 2024-05-23 RX ORDER — ATORVASTATIN CALCIUM 80 MG/1
80 TABLET, FILM COATED ORAL NIGHTLY
Status: DISCONTINUED | OUTPATIENT
Start: 2024-05-23 | End: 2024-05-23

## 2024-05-23 RX ORDER — CHOLECALCIFEROL (VITAMIN D3) 25 MCG
2000 TABLET ORAL EVERY MORNING
Status: DISCONTINUED | OUTPATIENT
Start: 2024-05-24 | End: 2024-05-28 | Stop reason: HOSPADM

## 2024-05-23 RX ORDER — MULTIVIT-MIN/IRON FUM/FOLIC AC 7.5 MG-4
1 TABLET ORAL EVERY MORNING
Status: DISCONTINUED | OUTPATIENT
Start: 2024-05-24 | End: 2024-05-28 | Stop reason: HOSPADM

## 2024-05-23 RX ORDER — GABAPENTIN 100 MG/1
200 CAPSULE ORAL 2 TIMES DAILY
Status: DISCONTINUED | OUTPATIENT
Start: 2024-05-23 | End: 2024-05-28 | Stop reason: HOSPADM

## 2024-05-23 RX ORDER — TAMSULOSIN HYDROCHLORIDE 0.4 MG/1
0.4 CAPSULE ORAL NIGHTLY
Status: DISCONTINUED | OUTPATIENT
Start: 2024-05-23 | End: 2024-05-28 | Stop reason: HOSPADM

## 2024-05-23 RX ORDER — FLUTICASONE PROPIONATE 50 MCG
1 SPRAY, SUSPENSION (ML) NASAL DAILY PRN
Status: DISCONTINUED | OUTPATIENT
Start: 2024-05-23 | End: 2024-05-28 | Stop reason: HOSPADM

## 2024-05-23 RX ORDER — ACETAMINOPHEN 325 MG/1
650 TABLET ORAL EVERY 4 HOURS PRN
Status: DISCONTINUED | OUTPATIENT
Start: 2024-05-23 | End: 2024-05-28 | Stop reason: HOSPADM

## 2024-05-23 RX ORDER — ASPIRIN 81 MG/1
81 TABLET ORAL DAILY
Status: DISCONTINUED | OUTPATIENT
Start: 2024-05-23 | End: 2024-05-23

## 2024-05-23 RX ORDER — PANTOPRAZOLE SODIUM 40 MG/1
40 TABLET, DELAYED RELEASE ORAL
Status: DISCONTINUED | OUTPATIENT
Start: 2024-05-24 | End: 2024-05-28 | Stop reason: HOSPADM

## 2024-05-23 RX ORDER — ICOSAPENT ETHYL 1 G/1
1000 CAPSULE ORAL DAILY
Status: DISCONTINUED | OUTPATIENT
Start: 2024-05-24 | End: 2024-05-28 | Stop reason: HOSPADM

## 2024-05-23 RX ORDER — DEXTROSE 50 % IN WATER (D50W) INTRAVENOUS SYRINGE
25
Status: DISCONTINUED | OUTPATIENT
Start: 2024-05-23 | End: 2024-05-28 | Stop reason: HOSPADM

## 2024-05-23 RX ORDER — INSULIN GLARGINE 100 [IU]/ML
35 INJECTION, SOLUTION SUBCUTANEOUS EVERY MORNING
Status: DISCONTINUED | OUTPATIENT
Start: 2024-05-24 | End: 2024-05-27

## 2024-05-23 RX ORDER — ASPIRIN 81 MG/1
325 TABLET ORAL ONCE
Status: COMPLETED | OUTPATIENT
Start: 2024-05-23 | End: 2024-05-23

## 2024-05-23 RX ORDER — FLUTICASONE PROPIONATE 50 MCG
1 SPRAY, SUSPENSION (ML) NASAL DAILY PRN
COMMUNITY

## 2024-05-23 RX ORDER — POLYETHYLENE GLYCOL 3350 17 G/17G
17 POWDER, FOR SOLUTION ORAL DAILY
Status: DISCONTINUED | OUTPATIENT
Start: 2024-05-23 | End: 2024-05-23

## 2024-05-23 RX ORDER — DEXTROSE 50 % IN WATER (D50W) INTRAVENOUS SYRINGE
12.5
Status: DISCONTINUED | OUTPATIENT
Start: 2024-05-23 | End: 2024-05-28 | Stop reason: HOSPADM

## 2024-05-23 RX ORDER — LANOLIN ALCOHOL/MO/W.PET/CERES
2000 CREAM (GRAM) TOPICAL DAILY
Status: DISCONTINUED | OUTPATIENT
Start: 2024-05-24 | End: 2024-05-28 | Stop reason: HOSPADM

## 2024-05-23 RX ORDER — NAPROXEN SODIUM 220 MG/1
81 TABLET, FILM COATED ORAL DAILY
Status: DISCONTINUED | OUTPATIENT
Start: 2024-05-24 | End: 2024-05-28 | Stop reason: HOSPADM

## 2024-05-23 RX ORDER — ATORVASTATIN CALCIUM 80 MG/1
80 TABLET, FILM COATED ORAL ONCE
Status: COMPLETED | OUTPATIENT
Start: 2024-05-23 | End: 2024-05-23

## 2024-05-23 RX ORDER — LOSARTAN POTASSIUM 50 MG/1
50 TABLET ORAL DAILY
Status: DISCONTINUED | OUTPATIENT
Start: 2024-05-23 | End: 2024-05-27

## 2024-05-23 RX ADMIN — ATORVASTATIN CALCIUM 80 MG: 80 TABLET, FILM COATED ORAL at 19:00

## 2024-05-23 RX ADMIN — ASPIRIN 325 MG: 81 TABLET, COATED ORAL at 18:56

## 2024-05-23 RX ADMIN — GABAPENTIN 200 MG: 100 CAPSULE ORAL at 20:42

## 2024-05-23 RX ADMIN — OXYBUTYNIN CHLORIDE 5 MG: 5 TABLET ORAL at 20:42

## 2024-05-23 SDOH — ECONOMIC STABILITY: TRANSPORTATION INSECURITY
IN THE PAST 12 MONTHS, HAS LACK OF TRANSPORTATION KEPT YOU FROM MEETINGS, WORK, OR FROM GETTING THINGS NEEDED FOR DAILY LIVING?: NO

## 2024-05-23 SDOH — HEALTH STABILITY: MENTAL HEALTH: HOW OFTEN DO YOU HAVE A DRINK CONTAINING ALCOHOL?: MONTHLY OR LESS

## 2024-05-23 SDOH — SOCIAL STABILITY: SOCIAL INSECURITY
WITHIN THE LAST YEAR, HAVE TO BEEN RAPED OR FORCED TO HAVE ANY KIND OF SEXUAL ACTIVITY BY YOUR PARTNER OR EX-PARTNER?: NO

## 2024-05-23 SDOH — SOCIAL STABILITY: SOCIAL NETWORK
DO YOU BELONG TO ANY CLUBS OR ORGANIZATIONS SUCH AS CHURCH GROUPS UNIONS, FRATERNAL OR ATHLETIC GROUPS, OR SCHOOL GROUPS?: NO

## 2024-05-23 SDOH — SOCIAL STABILITY: SOCIAL INSECURITY: DOES ANYONE TRY TO KEEP YOU FROM HAVING/CONTACTING OTHER FRIENDS OR DOING THINGS OUTSIDE YOUR HOME?: NO

## 2024-05-23 SDOH — ECONOMIC STABILITY: FOOD INSECURITY: WITHIN THE PAST 12 MONTHS, THE FOOD YOU BOUGHT JUST DIDN'T LAST AND YOU DIDN'T HAVE MONEY TO GET MORE.: NEVER TRUE

## 2024-05-23 SDOH — SOCIAL STABILITY: SOCIAL NETWORK: HOW OFTEN DO YOU ATTEND CHURCH OR RELIGIOUS SERVICES?: NEVER

## 2024-05-23 SDOH — SOCIAL STABILITY: SOCIAL INSECURITY: WITHIN THE LAST YEAR, HAVE YOU BEEN AFRAID OF YOUR PARTNER OR EX-PARTNER?: NO

## 2024-05-23 SDOH — HEALTH STABILITY: PHYSICAL HEALTH: ON AVERAGE, HOW MANY MINUTES DO YOU ENGAGE IN EXERCISE AT THIS LEVEL?: 0 MIN

## 2024-05-23 SDOH — ECONOMIC STABILITY: INCOME INSECURITY: IN THE PAST 12 MONTHS, HAS THE ELECTRIC, GAS, OIL, OR WATER COMPANY THREATENED TO SHUT OFF SERVICE IN YOUR HOME?: NO

## 2024-05-23 SDOH — ECONOMIC STABILITY: FOOD INSECURITY: WITHIN THE PAST 12 MONTHS, YOU WORRIED THAT YOUR FOOD WOULD RUN OUT BEFORE YOU GOT MONEY TO BUY MORE.: NEVER TRUE

## 2024-05-23 SDOH — SOCIAL STABILITY: SOCIAL INSECURITY: HAVE YOU HAD ANY THOUGHTS OF HARMING ANYONE ELSE?: NO

## 2024-05-23 SDOH — HEALTH STABILITY: PHYSICAL HEALTH: ON AVERAGE, HOW MANY DAYS PER WEEK DO YOU ENGAGE IN MODERATE TO STRENUOUS EXERCISE (LIKE A BRISK WALK)?: 0 DAYS

## 2024-05-23 SDOH — SOCIAL STABILITY: SOCIAL NETWORK: ARE YOU MARRIED, WIDOWED, DIVORCED, SEPARATED, NEVER MARRIED, OR LIVING WITH A PARTNER?: MARRIED

## 2024-05-23 SDOH — SOCIAL STABILITY: SOCIAL INSECURITY: WITHIN THE LAST YEAR, HAVE YOU BEEN HUMILIATED OR EMOTIONALLY ABUSED IN OTHER WAYS BY YOUR PARTNER OR EX-PARTNER?: NO

## 2024-05-23 SDOH — SOCIAL STABILITY: SOCIAL INSECURITY: ABUSE: ADULT

## 2024-05-23 SDOH — SOCIAL STABILITY: SOCIAL INSECURITY: DO YOU FEEL UNSAFE GOING BACK TO THE PLACE WHERE YOU ARE LIVING?: NO

## 2024-05-23 SDOH — SOCIAL STABILITY: SOCIAL INSECURITY: ARE THERE ANY APPARENT SIGNS OF INJURIES/BEHAVIORS THAT COULD BE RELATED TO ABUSE/NEGLECT?: NO

## 2024-05-23 SDOH — ECONOMIC STABILITY: INCOME INSECURITY: IN THE LAST 12 MONTHS, WAS THERE A TIME WHEN YOU WERE NOT ABLE TO PAY THE MORTGAGE OR RENT ON TIME?: NO

## 2024-05-23 SDOH — ECONOMIC STABILITY: TRANSPORTATION INSECURITY
IN THE PAST 12 MONTHS, HAS THE LACK OF TRANSPORTATION KEPT YOU FROM MEDICAL APPOINTMENTS OR FROM GETTING MEDICATIONS?: NO

## 2024-05-23 SDOH — HEALTH STABILITY: MENTAL HEALTH: HOW MANY STANDARD DRINKS CONTAINING ALCOHOL DO YOU HAVE ON A TYPICAL DAY?: 1 OR 2

## 2024-05-23 SDOH — SOCIAL STABILITY: SOCIAL INSECURITY: WERE YOU ABLE TO COMPLETE ALL THE BEHAVIORAL HEALTH SCREENINGS?: YES

## 2024-05-23 SDOH — SOCIAL STABILITY: SOCIAL INSECURITY: DO YOU FEEL ANYONE HAS EXPLOITED OR TAKEN ADVANTAGE OF YOU FINANCIALLY OR OF YOUR PERSONAL PROPERTY?: NO

## 2024-05-23 SDOH — HEALTH STABILITY: MENTAL HEALTH
STRESS IS WHEN SOMEONE FEELS TENSE, NERVOUS, ANXIOUS, OR CAN'T SLEEP AT NIGHT BECAUSE THEIR MIND IS TROUBLED. HOW STRESSED ARE YOU?: NOT AT ALL

## 2024-05-23 SDOH — ECONOMIC STABILITY: INCOME INSECURITY: HOW HARD IS IT FOR YOU TO PAY FOR THE VERY BASICS LIKE FOOD, HOUSING, MEDICAL CARE, AND HEATING?: NOT VERY HARD

## 2024-05-23 SDOH — SOCIAL STABILITY: SOCIAL NETWORK
IN A TYPICAL WEEK, HOW MANY TIMES DO YOU TALK ON THE PHONE WITH FAMILY, FRIENDS, OR NEIGHBORS?: MORE THAN THREE TIMES A WEEK

## 2024-05-23 SDOH — SOCIAL STABILITY: SOCIAL INSECURITY: ARE YOU OR HAVE YOU BEEN THREATENED OR ABUSED PHYSICALLY, EMOTIONALLY, OR SEXUALLY BY ANYONE?: NO

## 2024-05-23 SDOH — HEALTH STABILITY: MENTAL HEALTH: HOW OFTEN DO YOU HAVE 6 OR MORE DRINKS ON ONE OCCASION?: NEVER

## 2024-05-23 SDOH — SOCIAL STABILITY: SOCIAL INSECURITY
WITHIN THE LAST YEAR, HAVE YOU BEEN KICKED, HIT, SLAPPED, OR OTHERWISE PHYSICALLY HURT BY YOUR PARTNER OR EX-PARTNER?: NO

## 2024-05-23 SDOH — ECONOMIC STABILITY: HOUSING INSECURITY
IN THE LAST 12 MONTHS, WAS THERE A TIME WHEN YOU DID NOT HAVE A STEADY PLACE TO SLEEP OR SLEPT IN A SHELTER (INCLUDING NOW)?: NO

## 2024-05-23 SDOH — ECONOMIC STABILITY: HOUSING INSECURITY: IN THE LAST 12 MONTHS, HOW MANY PLACES HAVE YOU LIVED?: 1

## 2024-05-23 SDOH — SOCIAL STABILITY: SOCIAL INSECURITY: HAS ANYONE EVER THREATENED TO HURT YOUR FAMILY OR YOUR PETS?: NO

## 2024-05-23 SDOH — SOCIAL STABILITY: SOCIAL NETWORK: HOW OFTEN DO YOU GET TOGETHER WITH FRIENDS OR RELATIVES?: THREE TIMES A WEEK

## 2024-05-23 SDOH — SOCIAL STABILITY: SOCIAL NETWORK: HOW OFTEN DO YOU ATTENT MEETINGS OF THE CLUB OR ORGANIZATION YOU BELONG TO?: NEVER

## 2024-05-23 SDOH — SOCIAL STABILITY: SOCIAL INSECURITY: HAVE YOU HAD THOUGHTS OF HARMING ANYONE ELSE?: NO

## 2024-05-23 ASSESSMENT — COGNITIVE AND FUNCTIONAL STATUS - GENERAL
MOVING TO AND FROM BED TO CHAIR: A LITTLE
STANDING UP FROM CHAIR USING ARMS: A LITTLE
CLIMB 3 TO 5 STEPS WITH RAILING: A LITTLE
DAILY ACTIVITIY SCORE: 24
DRESSING REGULAR LOWER BODY CLOTHING: A LITTLE
MOBILITY SCORE: 21
WALKING IN HOSPITAL ROOM: A LITTLE
MOBILITY SCORE: 20
STANDING UP FROM CHAIR USING ARMS: A LITTLE
PATIENT BASELINE BEDBOUND: NO
TOILETING: A LITTLE
CLIMB 3 TO 5 STEPS WITH RAILING: A LITTLE
WALKING IN HOSPITAL ROOM: A LITTLE
PATIENT BASELINE BEDBOUND: NO
DAILY ACTIVITIY SCORE: 21
HELP NEEDED FOR BATHING: A LITTLE

## 2024-05-23 ASSESSMENT — ACTIVITIES OF DAILY LIVING (ADL)
HEARING - LEFT EAR: FUNCTIONAL
DRESSING YOURSELF: INDEPENDENT
TOILETING: INDEPENDENT
ADEQUATE_TO_COMPLETE_ADL: YES
WALKS IN HOME: INDEPENDENT
BATHING: INDEPENDENT
ASSISTIVE_DEVICE: CANE;EYEGLASSES;WALKER
JUDGMENT_ADEQUATE_SAFELY_COMPLETE_DAILY_ACTIVITIES: YES
GROOMING: INDEPENDENT
PATIENT'S MEMORY ADEQUATE TO SAFELY COMPLETE DAILY ACTIVITIES?: YES
FEEDING YOURSELF: INDEPENDENT
HEARING - RIGHT EAR: FUNCTIONAL

## 2024-05-23 ASSESSMENT — PAIN - FUNCTIONAL ASSESSMENT
PAIN_FUNCTIONAL_ASSESSMENT: 0-10
PAIN_FUNCTIONAL_ASSESSMENT: 0-10

## 2024-05-23 ASSESSMENT — LIFESTYLE VARIABLES
PRESCIPTION_ABUSE_PAST_12_MONTHS: NO
SUBSTANCE_ABUSE_PAST_12_MONTHS: NO
AUDIT-C TOTAL SCORE: 1
HOW MANY STANDARD DRINKS CONTAINING ALCOHOL DO YOU HAVE ON A TYPICAL DAY: 1 OR 2
SKIP TO QUESTIONS 9-10: 1
SKIP TO QUESTIONS 9-10: 1
AUDIT-C TOTAL SCORE: 1
AUDIT-C TOTAL SCORE: 1
HOW OFTEN DO YOU HAVE A DRINK CONTAINING ALCOHOL: MONTHLY OR LESS
HOW OFTEN DO YOU HAVE 6 OR MORE DRINKS ON ONE OCCASION: NEVER

## 2024-05-23 ASSESSMENT — COLUMBIA-SUICIDE SEVERITY RATING SCALE - C-SSRS

## 2024-05-23 ASSESSMENT — PATIENT HEALTH QUESTIONNAIRE - PHQ9
2. FEELING DOWN, DEPRESSED OR HOPELESS: NOT AT ALL
1. LITTLE INTEREST OR PLEASURE IN DOING THINGS: NOT AT ALL
SUM OF ALL RESPONSES TO PHQ9 QUESTIONS 1 & 2: 0

## 2024-05-23 ASSESSMENT — PAIN SCALES - GENERAL
PAINLEVEL_OUTOF10: 0 - NO PAIN

## 2024-05-23 NOTE — PROGRESS NOTES
PHARMACY STROKE RESPONSE      Patient Name: Kevin Dontao  MRN: [id number]  Location: [place] [address]    Patient Weight (kg):   Wt Readings from Last 1 Encounters:   05/16/24 92.5 kg (204 lb)        An acute Brain Attack has been activated, pharmacy participated in multidisciplinary team bedside response for Kevin Donato.  Contraindications for fibrinolytic therapy have been reviewed by pharmacy and any issues relating to medication therapy have been discussed directly with the provider(s) caring for this patient.     Pharmacy aided in the education, bedside response for fibrinolytic therapy. Patient did not receive fibrinolysis.         Thank you for allowing me to take part in the care of this patient.     Trixie Snow [facility]  5/23/2024  1:54 PM         References:    Neurological [place] Stroke Tools   Neurological [place] IV Thrombolysis Checklist

## 2024-05-23 NOTE — PROGRESS NOTES
Pharmacy Medication History Review    Kevin Donato is a 84 y.o. male admitted for TIA (transient ischemic attack). Pharmacy reviewed the patient's [id number] medications and allergies for accuracy.    The list below reflectives the updated PTA list. Please review each medication in order reconciliation for additional clarification and justification.  Prior to Admission medications    Medication Sig Start Date End Date Taking? Authorizing Provider   cholecalciferol (Vitamin D-3) 50 mcg (2,000 unit) capsule Take 1 capsule (50 mcg) by mouth once daily in the morning. 5/21/18  Yes Historical Provider, MD   cyanocobalamin, vitamin B-12, (Vitamin B-12) 1,000 mcg tablet extended release Take 2 tablets (2,000 mcg) by mouth once daily in the morning. As directed 5/21/18  Yes Historical Provider, MD   gabapentin (Neurontin) 100 mg capsule TAKE TWO CAPSULES BY MOUTH TWICE DAILY  Patient taking differently: Take 2 capsules (200 mg) by mouth 2 times a day. Last OARRS fill: 5/14/24 #120 for 30 days 5/14/24  Yes Yarely Schroeder DO   HumaLOG KwikPen Insulin 100 unit/mL injection INJECT 3 to 7 UNITS 3 times daily 2/5/24  Yes Yarely Schroeder DO   insulin glargine (Lantus Solostar U-100 Insulin) 100 unit/mL (3 mL) pen Inject 35 Units under the skin once daily at bedtime. Take as directed per insulin instructions.  Patient taking differently: Inject 35 Units under the skin once daily in the morning. Take as directed per insulin instructions. 11/16/23  Yes Yarely Schroeder DO   losartan (Cozaar) 50 mg tablet TAKE 1 TABLET DAILY.  Patient taking differently: Take 1 tablet (50 mg) by mouth once daily in the morning. 4/8/24  Yes Yarely Schroeder DO   mirabegron (Myrbetriq) 25 mg tablet extended release 24 hr 24 hr tablet Take 1 tablet (25 mg) by mouth once daily. [id number]  12/2025  Patient taking differently: Take 1 tablet (25 mg) by mouth once daily in the morning. [id number]  12/2025 5/10/24 6/21/24 Yes Margie NAIR  "MD Kassi   multivitamin tablet Take 1 tablet by mouth once daily in the morning.   Yes Historical Provider, MD   omeprazole (PriLOSEC) 20 mg DR capsule TAKE 1 CAPSULE BY MOUTH EVERY DAY  Patient taking differently: Take 1 capsule (20 mg) by mouth once daily in the morning. 2/26/24  Yes Yarely Schroeder DO   simvastatin (Zocor) 20 mg tablet TAKE ONE TABLET BY MOUTH EVERY DAY IN THE EVENING 1/19/24  Yes Yarely Schroeder DO   tamsulosin (Flomax) 0.4 mg 24 hr capsule Take 1 capsule (0.4 mg) by mouth once daily at bedtime. 3/7/24 3/7/25 Yes Margie Solitario MD   acetaminophen (Tylenol) 325 mg tablet Take 2 tablets (650 mg) by mouth every 4 hours if needed.    Historical Provider, MD   blood sugar diagnostic (Contour Test Strips) strip Test blood sugar twice a day 5/15/18   Historical Provider, MD   blood sugar diagnostic (True Metrix Glucose Test Strip) strip Test 4 times daily due to hypoglycemia 2/28/19   Historical Provider, MD   fluticasone (Flonase) 50 mcg/actuation nasal spray Administer 1 spray into each nostril once daily as needed for rhinitis. Shake gently. Before first use, prime pump. After use, clean tip and replace cap.    Historical Provider, MD   lancets 26 gauge misc Easy touch lancets 26G/twist MISC---check sugar twice daily    Historical Provider, MD   omega 3-dha-epa-fish oil 1,200 (144-216) mg capsule Take 1 capsule (1,200 mg) by mouth once daily.    Historical Provider, MD   pen needle, diabetic (Sure Comfort Pen Needle) 32 gauge x 5/32\" needle Take as directed. Use daily for lantus and 3times a day for humalog coverage 2/5/24   Yarely Schroeder, DO   QuickVue At-Home COVID-19 Test kit AS DIRECTED 4/18/23   Historical Provider, MD   triamcinolone (Kenalog) 0.1 % cream Apply topically 2 times a day. Apply to affected area 1-2 times daily as needed. Avoid face and groin. 11/16/23   Yarely Schroeder, DO        The list below reflectives the updated allergy list. Please review each documented " allergy for additional clarification and justification.  Allergies  Reviewed by Karo Hinds RN on 5/23/2024        Severity Reactions Comments    Lisinopril Not Specified Hives, Unknown             Below are additional concerns with the patient's PTA list.      Yane Armando

## 2024-05-23 NOTE — HOSPITAL COURSE
84-year-old male presented to hospital for right-sided weakness that resolved upon arrival to the ED, concern for TIA, admitted for stroke workup.  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 echocardiogram performed.  PT/OT consulted. Pt started on DAPT, increased statin dose, and neuro checks during his stay.

## 2024-05-23 NOTE — ED PROVIDER NOTES
History/Exam limitations: none.   Additional history was obtained from patient.    HPI:       Kevin Donato is a 84 y.o. with a history of with a past medical history of hypertension, CKD, diabetes, presenting to the emergency department today due to concerns for stroke.  Per the patient, he was getting up to get up.  He was having difficulty ambulating and felt dizzy.  Per EMS, on their arrival he had significant weakness over the right side of his body.  They noted left-sided facial droop.  As such, he was activated as a stroke.  EMS notes that his symptoms have largely improved and route.  He states that this occurred in the past as well.  He has never had a workup for TIA or stroke..       Last Known Well Time: 1300        ------------------------------------------------------------------------------------------------------------------------------------------     Physical Exam:    ED Triage Vitals [05/23/24 1406]   Temp Heart Rate Respirations BP   -- 61 15 178/75      Pulse Ox Temp src Heart Rate Source Patient Position   99 % -- -- --      BP Location FiO2 (%)     -- --          Gen: Not in acute distress  Head/Neck: NCAT  Eyes: Anicteric sclerae, noninjected conjunctivae  Nose: No rhinorrhea  Mouth:  MMM  Heart: Regular rate and rhythm w/ no murmurs, rubs, gallops  Lungs: CTAB w/o wheezes, rales, rhonchi, no increased work of breathing  Abdomen: Soft, NT/ND  Musculoskeletal: No deformities  Extremities: No edema.  Neurologic: Please see below for NIHSS  Skin: No rashes noted  Psychological: Calm        Interval: Baseline  Time: 2:16 PM  Person Administering Scale: Steph Burdick MD     1a  Level of consciousness: 0=alert; keenly responsive   1b. LOC questions:  0=Performs both tasks correctly   1c. LOC commands: 0=Performs both tasks correctly   2.  Best Gaze: 0=normal   3. Visual: 0=No visual loss   4. Facial Palsy: 0=Normal symmetric movement   5a. Motor left arm: 0=No drift, limb holds 90 (or 45)  degrees for full 10 seconds   5b.  Motor right arm: 0=No drift, limb holds 90 (or 45) degrees for full 10 seconds   6a. motor left le=No drift, limb holds 90 (or 45) degrees for full 10 seconds   6b  Motor right le=No drift, limb holds 90 (or 45) degrees for full 10 seconds   7. Limb Ataxia: 0=Absent   8.  Sensory: 0=Normal; no sensory loss   9. Best Language:  0=No aphasia, normal   10. Dysarthria: 0=Normal   11. Extinction and Inattention: 0=No abnormality     Total:   0         VAN: VAN: Negative     ------------------------------------------------------------------------------------------------------------------------------------------     Medical Decision Making:     The patient is an 84-year-old male presenting to the emergency department today due to concerns for stroke.  Patient has a history of hypertension, CKD, diabetes presenting with weakness in the right side of his body.  Symptoms started proximately 1 hour prior to arrival.  He is having difficulty ambulating.  Per EMS, his right side of his body was very weak upon their arrival however improved since arrival.  NIHSS is 0.  Patient's van negative.  CT head was unremarkable.  He will require workup for TIA as such, will be admitted to medicine for further management.    ED Course as of 24 1418   Thu May 23, 2024   1415 EKG read by me reviewed by me is normal sinus rhythm with a sinus arrhythmia at 65 bpm.  Normal axis.  Normal intervals.  No ST segment elevation or depression. [HD]   1416 On my assessment patient has an NIH of 0.  He is in the window of tenecteplase but given his reassuring neuroexam do not feel like the risks outweigh the benefit.  This was discussed with the patient who is agreeable.  Patient states the same symptoms happened 1 week ago and he saw his primary care regarding them.  He states that he had right-sided deficits.  I did ambulate the patient at bedside and he was able to ambulate with a steady gait.  He  states he was not able to do this 1 hour prior to arrival.  Symptoms appear consistent with a TIA.  Patient will require admission for MRI.  Discussed with patient who is agreeable to plan. [HD]      ED Course User Index  [HD] Velvet Eaton DO        EKG interpreted by myself: see above      Independent Interpretation of Studies: I independently interpreted the CT head and see No obvious evidence of intracranial hemorrhage    Chronic Medical Conditions Significantly Affecting Care: none    Social Determinants of Health Significantly Affecting Care:  none      External Records Reviewed: I reviewed recent and relevant outside records including:      Discussion of Management with Other Providers:   I discussed the patient/results with: the admitting team      IV Thrombolysis IV Thrombolysis Checklist        IV Thrombolysis Given: No; Thrombolysis contraindication reason: Neurologic signs are mild and judged to be non-disabling and Neurologic signs have spontaneously resolved         Procedure  Procedures          Steph Burdick MD  Resident  05/23/24 5435

## 2024-05-23 NOTE — H&P
Internal Medicine-History and Physical Note   Patient: Kevin Donato  Room/bed:  221/221-A  Admitted on: 5/23/2024    Age: 84 y.o.   Gender: male   Code Status:  Full Code   Admitting Dx: TIA (transient ischemic attack) [G45.9]    MRN: [id number]  PCP: Yarely Schroeder DO     Attending: Karlie Randle DO Nurse: No care team member to display  TCC: No care team member to display      HPI      Chief Complaint   Patient presents with    Stroke     Kevin Donato is a 84 y.o. male with PMH of insulin-dependent T2DM, CKD 3, BPH, HTN, HLD, who presented to the hospital for right-sided weakness concerning for stroke.  Patient reports he was reading his mail sitting at a table around 11 AM this morning when he suddenly got brain fog, could not control his right arm, and was not able to stand up due to right-sided leg weakness, and feeling like he kept bleeding towards his right side. Endorsed paresthesia of his nose and a pain of his lateral and posterior neck, and lightheadedness. No loss of consciousness, did not fall, did not hit head.  Wife was present during entire event, EMS was called and arrived around 1 PM, patient was still too weak to walk to the ambulance. Upon arrival to the ED, NIH 0, patient had resolution of all of his symptoms without intervention.  Was able to ambulate in the ED.  Walks with a cane at baseline. Denies dysarthria, dysphagia, chest pain, fever, chills, shortness of breath, abdominal pain, urinary symptoms, constipation, diarrhea, melena, recent illness, or sick contacts.  Of note, he reports a similar episode of weakness about 1 month ago for which he gradually lowered himself to the floor, no fall, no LOC, and patient was never medically evaluated for the symptoms, has never had a stroke workup.      ED course:     Vitals: Temp 97.5, /85, pulse 67 bpm, respiratory rate 16, 98% on room air    Labs:   -CBC -unremarkable  -CMP - Glucose 222, BUN 22, Cr 1.42 (baseline 1.4),    -Troponin: 6    Imaging:   -CT head without IV contrast: No acute intracranial hemorrhage or mass effect. moderate ventriculomegaly which could reflect communicating hydrocephalus similar to 2018 MRI imaging. chronic inflammatory changes of the left maxillary sinus    Interventions: CT head without contrast    ROS: 12 points review of system is negative except as stated in the HPI above.     Past Medical History     Past Medical History:   Diagnosis Date    Calculus of bile duct without cholangitis or cholecystitis without obstruction 02/21/2022    Choledocholithiasis    Fever, unspecified 01/10/2022    Fever and chills    Generalized abdominal pain 01/10/2022    Generalized abdominal pain    Hiccough 08/31/2022    Intractable hiccups    Iron deficiency anemia secondary to blood loss (chronic) 02/28/2019    Iron deficiency anemia due to chronic blood loss    Otalgia, right ear 04/28/2016    Right ear pain    Other cholangitis (CMS-HCC) 02/14/2022    Ascending cholangitis    Other iron deficiency anemias 12/16/2016    Other iron deficiency anemia    Other skin changes 10/13/2017    Vesicular rash    Pain in right shoulder 10/07/2016    Right shoulder pain    Parkinson's disease (Multi) 03/23/2020    Parkinsonism    Personal history of diseases of the skin and subcutaneous tissue 02/28/2019    History of urticaria    Personal history of other diseases of the circulatory system     History of hypertension    Personal history of other diseases of the digestive system 08/10/2018    History of pancreatitis    Personal history of other diseases of the musculoskeletal system and connective tissue 02/26/2020    History of polymyalgia rheumatica    Personal history of other diseases of the nervous system and sense organs 05/21/2018    History of hydrocephalus    Personal history of other diseases of the nervous system and sense organs 04/07/2015    History of eustachian tube dysfunction    Personal history of other diseases  of the respiratory system 01/10/2019    History of sinusitis    Personal history of other diseases of urinary system 11/12/2014    History of hematuria    Personal history of other specified conditions 10/16/2017    History of edema    Personal history of other specified conditions 01/11/2022    History of jaundice    Type 2 diabetes mellitus without complications (Multi) 11/29/2022    Diabetes mellitus        Surgical History     Past Surgical History:   Procedure Laterality Date    HERNIA REPAIR  02/14/2017    Hernia Repair    NOSE SURGERY  02/14/2017    Nose Surgery    OTHER SURGICAL HISTORY  05/24/2022    Cholecystectomy       Family History     Family History   Problem Relation Name Age of Onset    Diabetes Mother      Colon cancer Sister      Leukemia Sister         Social History   EtOH use: 1 small kahn colada per month.  Social History     Socioeconomic History    Marital status:      Spouse name: Not on file    Number of children: Not on file    Years of education: Not on file    Highest education level: Not on file   Occupational History    Not on file   Tobacco Use    Smoking status: Never    Smokeless tobacco: Never   Substance and Sexual Activity    Alcohol use: Never    Drug use: Never    Sexual activity: Not on file   Other Topics Concern    Not on file   Social History Narrative    Not on file     Social Determinants of Health     Financial Resource Strain: Not on file   Food Insecurity: Not on file   Transportation Needs: Not on file   Physical Activity: Not on file   Stress: Not on file   Social Connections: Not on file   Intimate Partner Violence: Not on file   Housing Stability: Not on file       Tobacco Use: Low Risk  (5/16/2024)    Patient History     Smoking Tobacco Use: Never     Smokeless Tobacco Use: Never     Passive Exposure: Not on file        Social History     Substance and Sexual Activity   Alcohol Use Never        Allergies     Allergies   Allergen Reactions    Lisinopril  "Hives and Unknown          Meds    Scheduled medications  [START ON 5/24/2024] aspirin, 81 mg, oral, Daily  aspirin, 325 mg, oral, Once  atorvastatin, 80 mg, oral, Nightly  [START ON 5/24/2024] cholecalciferol, 2,000 Units, oral, q AM  [START ON 5/24/2024] cyanocobalamin, 2,000 mcg, oral, Daily  gabapentin, 200 mg, oral, BID  [Held by provider] heparin (porcine), 5,000 Units, subcutaneous, q8h  [START ON 5/24/2024] icosapent ethyL, 1,000 mg, oral, Daily  [START ON 5/24/2024] insulin glargine, 35 Units, subcutaneous, q AM  insulin lispro, 0-10 Units, subcutaneous, TID  [Held by provider] losartan, 50 mg, oral, Daily  [START ON 5/24/2024] multivitamin with minerals, 1 tablet, oral, q AM  oxybutynin, 5 mg, oral, BID  [START ON 5/24/2024] pantoprazole, 40 mg, oral, Daily before breakfast  [Held by provider] tamsulosin, 0.4 mg, oral, Nightly      Continuous medications     PRN medications  PRN medications: acetaminophen, dextrose, dextrose, fluticasone, glucagon, glucagon, oxygen     Objective     Vitals  Visit Vitals  /65   Pulse 58   Temp 36.4 °C (97.5 °F)   Resp 18   Ht 1.778 m (5' 10\")   Wt 94.4 kg (208 lb 1.8 oz)   SpO2 99%   BMI 29.86 kg/m²   Smoking Status Never   BSA 2.16 m²        Physical Examination:  Gen: well appearing, in NAD  HEENT: AT/NC, no conjunctival pallor, anicteric sclera, EOMI   Neck: supple, full range of motion, nontender to palpation of neck musculature and C-spine.  No LAD/JVD  Chest: CTAB, no wheezing, crackles, rales, rhonchi.  No respiratory distress on room air  CVS: RRR, no murmurs  Abd: soft, distended, bowel sounds in all 4 quadrants, nontender to palpation in all 4 quadrants  Ext: Mild bilateral lower extremity edema, chronic.  No cyanosis/clubbing, no erythema, negative Homans' sign. nontender to palpation.  Equal and symmetric movement of all 4 extremities.    Neuro: AOx3, CN 2-12 intact, full range of motion and motor 5/5 globally, sensation intact.  No dysdiadochokinesia, no " "dysmetria, no ataxia.  Of note, left-sided mild facial skin droop is chronic, facial muscles are equal and symmetric bilaterally.    I/Os  No intake or output data in the 24 hours ending 05/23/24 1721    Vent Settings         Labs:   Results from last 72 hours   Lab Units 05/23/24  1408   SODIUM mmol/L 139   POTASSIUM mmol/L 4.2   CHLORIDE mmol/L 105   CO2 mmol/L 25   BUN mg/dL 22   CREATININE mg/dL 1.42*   GLUCOSE mg/dL 222*   CALCIUM mg/dL 9.1   ANION GAP mmol/L 13   EGFR mL/min/1.73m*2 48*      Results from last 72 hours   Lab Units 05/23/24  1408   WBC AUTO x10*3/uL 7.0   HEMOGLOBIN g/dL 13.3*   HEMATOCRIT % 39.1*   PLATELETS AUTO x10*3/uL 169   NEUTROS PCT AUTO % 71.9   LYMPHS PCT AUTO % 16.0   MONOS PCT AUTO % 6.9   EOS PCT AUTO % 4.1      Lab Results   Component Value Date    CALCIUM 9.1 05/23/2024    PHOS 2.3 (L) 02/10/2022      Lab Results   Component Value Date    CRP 0.12 10/18/2017      [unfilled]     Micro/ID:   No results found for the last 90 days.                   No lab exists for component: \"AGALPCRNB\"   .ID  No results found for: \"URINECULTURE\", \"BLOODCULT\", \"CSFCULTSMEAR\"      Images    CT brain attack head wo IV contrast  Narrative: Interpreted By:  Sarah Pimentel,   STUDY:  CT BRAIN ATTACK HEAD WO IV CONTRAST;  5/23/2024 1:58 pm      INDICATION:  Signs/Symptoms:Stroke Evaluation.      COMPARISON:  Brain MRI 03/15/2018      ACCESSION NUMBER(S):  [id number]      ORDERING CLINICIAN:  JULIAN BONE      TECHNIQUE:  Unenhanced CT images of the head were obtained.      FINDINGS:  Diffuse cerebral atrophy with prominence of the extra-axial spaces  and cerebral sulci. Moderate ventriculomegaly somewhat out of  proportion to the degree of cerebral atrophy. Periventricular white  matter hypodensities consistent with small vessel ischemic disease.  These findings are similar to the previous exam. There is no acute  intracranial hemorrhage, mass effect or midline shift. No extraaxial  fluid " collection.      No focal calvarial lesion.      Left maxillary sinus mucosal thickening with thickened, sclerotic  walls. Remaining visualized paranasal sinuses are clear.      Impression: No acute intracranial hemorrhage or mass-effect.      Moderate ventriculomegaly which could reflect communicating  hydrocephalus similar to 03/15/2018 MRI.      Chronic inflammatory changes of the left maxillary sinus.      MACRO:  Sarah Pimentel discussed the significance and urgency of this critical  finding by telephone with  Steph Burdick on 5/23/2024 at 2:04 pm.  (**-RCF-**) Findings:  See findings.          Signed by: Sarah Pimentel 5/23/2024 2:08 PM  Dictation workstation:   [id number]      Assessment and Plan    Problem list:   Principal Problem:    TIA (transient ischemic attack)    Kevin Donato is a 84 y.o. male with PMH of insulin-dependent T2DM, CKD 3, BPH, HTN, HLD, admitted for TIA and stroke workup.     Acute Medical Issues   #TIA  #Right UE and LE weakness - resolved  -LDL 78, triglycerides 143  - Hemoglobin A1c pending  - ordered MRI brain w/o IV contrast, and MRA head and neck w/o IV contrast  -Neurology consulted  - TTE ordered  - PT/OT consulted  - Aspirin 325 mg p.o. x 1  - Aspirin 81 mg p.o. daily starting tomorrow  - Atorvastatin 80 mg p.o. nightly  -Aspiration cautions  - Telemetry and pulse ox monitoring  - Neurochecks every hour until stroke rule out  - Tylenol 650 mg p.o. every 4 hours as needed for mild pain or headache    #HTN  - Permissive hypertension in the setting of TIA  - Holding losartan 50 mg p.o. daily    Chronic Medical Issues   #Insulin-dependent T2DM  - home regimen: Lantus 35 units every morning  -Lispro sliding scale 0-10 units 3 times daily with meals    #Peripheral neuropathy  - Gabapentin 200 mg p.o. twice daily    #BPH  - Oxybutynin 5 mg p.o. twice daily  - Holding tamsulosin 0.4 p.o. nightly    #vitamin supplementation  -Vitamin D3 2000 units p.o. daily  - Vitamin B12 2000 mcg  daily  - Omega-3 daily  - Multivitamin daily    IVF: PRN   Electrolytes: Replete as needed   Diet: Carb controlled  GI ppx: Protonix  DVT ppx: Holding subQ heparin until after MR results  Antibiotics: none  Code: Full Code       Disposition: 84 y.o.male admitted for TIA and stroke workup.  MRI imaging ordered, neurology consulted, TTE ordered, PT/OT on board.  Estimated length of stay less than 48 hrs.     Heather Cramer DO  Transitional Year, PGY-1

## 2024-05-24 ENCOUNTER — APPOINTMENT (OUTPATIENT)
Dept: RADIOLOGY | Facility: HOSPITAL | Age: 84
DRG: 065 | End: 2024-05-24
Payer: MEDICARE

## 2024-05-24 ENCOUNTER — APPOINTMENT (OUTPATIENT)
Dept: CARDIOLOGY | Facility: HOSPITAL | Age: 84
DRG: 065 | End: 2024-05-24
Payer: MEDICARE

## 2024-05-24 LAB
ANION GAP SERPL CALC-SCNC: 11 MMOL/L (ref 10–20)
AORTIC VALVE MEAN GRADIENT: 4.9 MMHG
AORTIC VALVE PEAK VELOCITY: 1.48 M/S
AV PEAK GRADIENT: 8.7 MMHG
AVA (PEAK VEL): 2.3 CM2
AVA (VTI): 2.26 CM2
BNP SERPL-MCNC: 159 PG/ML (ref 0–99)
BUN SERPL-MCNC: 18 MG/DL (ref 6–23)
CALCIUM SERPL-MCNC: 9.1 MG/DL (ref 8.6–10.3)
CHLORIDE SERPL-SCNC: 106 MMOL/L (ref 98–107)
CO2 SERPL-SCNC: 27 MMOL/L (ref 21–32)
CREAT SERPL-MCNC: 1.42 MG/DL (ref 0.5–1.3)
EGFRCR SERPLBLD CKD-EPI 2021: 48 ML/MIN/1.73M*2
EJECTION FRACTION APICAL 4 CHAMBER: 57.4
ERYTHROCYTE [DISTWIDTH] IN BLOOD BY AUTOMATED COUNT: 12.2 % (ref 11.5–14.5)
EST. AVERAGE GLUCOSE BLD GHB EST-MCNC: 186 MG/DL
GLUCOSE BLD MANUAL STRIP-MCNC: 157 MG/DL (ref 74–99)
GLUCOSE BLD MANUAL STRIP-MCNC: 184 MG/DL (ref 74–99)
GLUCOSE BLD MANUAL STRIP-MCNC: 213 MG/DL (ref 74–99)
GLUCOSE BLD MANUAL STRIP-MCNC: 223 MG/DL (ref 74–99)
GLUCOSE SERPL-MCNC: 178 MG/DL (ref 74–99)
HBA1C MFR BLD: 8.1 %
HCT VFR BLD AUTO: 40 % (ref 41–52)
HGB BLD-MCNC: 13.3 G/DL (ref 13.5–17.5)
LEFT ATRIUM VOLUME AREA LENGTH INDEX BSA: 20.4 ML/M2
LEFT VENTRICLE INTERNAL DIMENSION DIASTOLE: 5.15 CM (ref 3.5–6)
LEFT VENTRICULAR OUTFLOW TRACT DIAMETER: 2.04 CM
LV EJECTION FRACTION BIPLANE: 55 %
MCH RBC QN AUTO: 33.1 PG (ref 26–34)
MCHC RBC AUTO-ENTMCNC: 33.3 G/DL (ref 32–36)
MCV RBC AUTO: 100 FL (ref 80–100)
MITRAL VALVE E/A RATIO: 0.66
MITRAL VALVE E/E' RATIO: 13.93
NRBC BLD-RTO: 0 /100 WBCS (ref 0–0)
PLATELET # BLD AUTO: 161 X10*3/UL (ref 150–450)
POTASSIUM SERPL-SCNC: 4.3 MMOL/L (ref 3.5–5.3)
RBC # BLD AUTO: 4.02 X10*6/UL (ref 4.5–5.9)
RIGHT VENTRICLE FREE WALL PEAK S': 13 CM/S
RIGHT VENTRICLE PEAK SYSTOLIC PRESSURE: 30.7 MMHG
SODIUM SERPL-SCNC: 140 MMOL/L (ref 136–145)
TRICUSPID ANNULAR PLANE SYSTOLIC EXCURSION: 2.6 CM
WBC # BLD AUTO: 7.6 X10*3/UL (ref 4.4–11.3)

## 2024-05-24 PROCEDURE — 2500000002 HC RX 250 W HCPCS SELF ADMINISTERED DRUGS (ALT 637 FOR MEDICARE OP, ALT 636 FOR OP/ED): Performed by: STUDENT IN AN ORGANIZED HEALTH CARE EDUCATION/TRAINING PROGRAM

## 2024-05-24 PROCEDURE — 83519 RIA NONANTIBODY: CPT | Performed by: PSYCHIATRY & NEUROLOGY

## 2024-05-24 PROCEDURE — 99222 1ST HOSP IP/OBS MODERATE 55: CPT | Performed by: PSYCHIATRY & NEUROLOGY

## 2024-05-24 PROCEDURE — G0378 HOSPITAL OBSERVATION PER HR: HCPCS

## 2024-05-24 PROCEDURE — 99223 1ST HOSP IP/OBS HIGH 75: CPT | Performed by: STUDENT IN AN ORGANIZED HEALTH CARE EDUCATION/TRAINING PROGRAM

## 2024-05-24 PROCEDURE — 94760 N-INVAS EAR/PLS OXIMETRY 1: CPT

## 2024-05-24 PROCEDURE — 97165 OT EVAL LOW COMPLEX 30 MIN: CPT | Mod: GO

## 2024-05-24 PROCEDURE — 85027 COMPLETE CBC AUTOMATED: CPT

## 2024-05-24 PROCEDURE — 97535 SELF CARE MNGMENT TRAINING: CPT | Mod: GO

## 2024-05-24 PROCEDURE — 70450 CT HEAD/BRAIN W/O DYE: CPT

## 2024-05-24 PROCEDURE — 97161 PT EVAL LOW COMPLEX 20 MIN: CPT | Mod: GP

## 2024-05-24 PROCEDURE — 93306 TTE W/DOPPLER COMPLETE: CPT | Performed by: STUDENT IN AN ORGANIZED HEALTH CARE EDUCATION/TRAINING PROGRAM

## 2024-05-24 PROCEDURE — 2500000002 HC RX 250 W HCPCS SELF ADMINISTERED DRUGS (ALT 637 FOR MEDICARE OP, ALT 636 FOR OP/ED)

## 2024-05-24 PROCEDURE — 36415 COLL VENOUS BLD VENIPUNCTURE: CPT | Performed by: PSYCHIATRY & NEUROLOGY

## 2024-05-24 PROCEDURE — 2500000001 HC RX 250 WO HCPCS SELF ADMINISTERED DRUGS (ALT 637 FOR MEDICARE OP)

## 2024-05-24 PROCEDURE — 82947 ASSAY GLUCOSE BLOOD QUANT: CPT

## 2024-05-24 PROCEDURE — 93306 TTE W/DOPPLER COMPLETE: CPT

## 2024-05-24 PROCEDURE — 2500000004 HC RX 250 GENERAL PHARMACY W/ HCPCS (ALT 636 FOR OP/ED)

## 2024-05-24 PROCEDURE — 83880 ASSAY OF NATRIURETIC PEPTIDE: CPT | Performed by: INTERNAL MEDICINE

## 2024-05-24 PROCEDURE — 83516 IMMUNOASSAY NONANTIBODY: CPT | Performed by: PSYCHIATRY & NEUROLOGY

## 2024-05-24 PROCEDURE — 96372 THER/PROPH/DIAG INJ SC/IM: CPT

## 2024-05-24 PROCEDURE — 36415 COLL VENOUS BLD VENIPUNCTURE: CPT

## 2024-05-24 PROCEDURE — 70450 CT HEAD/BRAIN W/O DYE: CPT | Performed by: STUDENT IN AN ORGANIZED HEALTH CARE EDUCATION/TRAINING PROGRAM

## 2024-05-24 PROCEDURE — 80048 BASIC METABOLIC PNL TOTAL CA: CPT

## 2024-05-24 PROCEDURE — 2500000001 HC RX 250 WO HCPCS SELF ADMINISTERED DRUGS (ALT 637 FOR MEDICARE OP): Performed by: INTERNAL MEDICINE

## 2024-05-24 RX ORDER — ACETAMINOPHEN 500 MG
5 TABLET ORAL NIGHTLY PRN
Status: DISCONTINUED | OUTPATIENT
Start: 2024-05-24 | End: 2024-05-28 | Stop reason: HOSPADM

## 2024-05-24 RX ORDER — CLOPIDOGREL BISULFATE 75 MG/1
75 TABLET ORAL DAILY
Status: DISCONTINUED | OUTPATIENT
Start: 2024-05-24 | End: 2024-05-28 | Stop reason: HOSPADM

## 2024-05-24 RX ORDER — LABETALOL HYDROCHLORIDE 5 MG/ML
5 INJECTION, SOLUTION INTRAVENOUS EVERY 4 HOURS PRN
Status: ACTIVE | OUTPATIENT
Start: 2024-05-24 | End: 2024-05-25

## 2024-05-24 RX ORDER — LABETALOL HYDROCHLORIDE 5 MG/ML
5 INJECTION, SOLUTION INTRAVENOUS ONCE
Status: COMPLETED | OUTPATIENT
Start: 2024-05-24 | End: 2024-05-24

## 2024-05-24 RX ADMIN — GABAPENTIN 200 MG: 100 CAPSULE ORAL at 08:49

## 2024-05-24 RX ADMIN — OXYBUTYNIN CHLORIDE 5 MG: 5 TABLET ORAL at 08:49

## 2024-05-24 RX ADMIN — Medication 1 TABLET: at 08:49

## 2024-05-24 RX ADMIN — ICOSAPENT ETHYL 1000 MG: 1 CAPSULE ORAL at 08:49

## 2024-05-24 RX ADMIN — Medication 2000 MCG: at 08:49

## 2024-05-24 RX ADMIN — OXYBUTYNIN CHLORIDE 5 MG: 5 TABLET ORAL at 20:20

## 2024-05-24 RX ADMIN — Medication 2000 UNITS: at 08:49

## 2024-05-24 RX ADMIN — ASPIRIN 81 MG: 81 TABLET, CHEWABLE ORAL at 08:49

## 2024-05-24 RX ADMIN — ATORVASTATIN CALCIUM 80 MG: 80 TABLET, FILM COATED ORAL at 20:20

## 2024-05-24 RX ADMIN — PANTOPRAZOLE SODIUM 40 MG: 40 TABLET, DELAYED RELEASE ORAL at 06:19

## 2024-05-24 RX ADMIN — INSULIN LISPRO 2 UNITS: 100 INJECTION, SOLUTION INTRAVENOUS; SUBCUTANEOUS at 08:49

## 2024-05-24 RX ADMIN — LABETALOL HYDROCHLORIDE 5 MG: 5 INJECTION, SOLUTION INTRAVENOUS at 16:49

## 2024-05-24 RX ADMIN — INSULIN GLARGINE 35 UNITS: 100 INJECTION, SOLUTION SUBCUTANEOUS at 09:02

## 2024-05-24 RX ADMIN — Medication 5 MG: at 20:20

## 2024-05-24 RX ADMIN — INSULIN LISPRO 2 UNITS: 100 INJECTION, SOLUTION INTRAVENOUS; SUBCUTANEOUS at 11:52

## 2024-05-24 RX ADMIN — CLOPIDOGREL 75 MG: 75 TABLET ORAL at 16:49

## 2024-05-24 RX ADMIN — INSULIN LISPRO 4 UNITS: 100 INJECTION, SOLUTION INTRAVENOUS; SUBCUTANEOUS at 16:30

## 2024-05-24 RX ADMIN — GABAPENTIN 200 MG: 100 CAPSULE ORAL at 20:20

## 2024-05-24 RX ADMIN — HEPARIN SODIUM 5000 UNITS: 5000 INJECTION INTRAVENOUS; SUBCUTANEOUS at 16:48

## 2024-05-24 ASSESSMENT — COGNITIVE AND FUNCTIONAL STATUS - GENERAL
TURNING FROM BACK TO SIDE WHILE IN FLAT BAD: A LITTLE
DRESSING REGULAR UPPER BODY CLOTHING: A LITTLE
HELP NEEDED FOR BATHING: A LOT
TOILETING: A LOT
STANDING UP FROM CHAIR USING ARMS: A LITTLE
HELP NEEDED FOR BATHING: A LOT
DAILY ACTIVITIY SCORE: 18
MOBILITY SCORE: 18
CLIMB 3 TO 5 STEPS WITH RAILING: A LOT
TOILETING: A LITTLE
MOVING TO AND FROM BED TO CHAIR: A LITTLE
WALKING IN HOSPITAL ROOM: A LOT
DAILY ACTIVITIY SCORE: 16
WALKING IN HOSPITAL ROOM: A LITTLE
PERSONAL GROOMING: A LITTLE
CLIMB 3 TO 5 STEPS WITH RAILING: A LOT
DRESSING REGULAR UPPER BODY CLOTHING: A LITTLE
MOVING TO AND FROM BED TO CHAIR: A LITTLE
DRESSING REGULAR LOWER BODY CLOTHING: A LITTLE
PERSONAL GROOMING: A LITTLE
STANDING UP FROM CHAIR USING ARMS: A LITTLE
MOBILITY SCORE: 18
DRESSING REGULAR LOWER BODY CLOTHING: A LOT

## 2024-05-24 ASSESSMENT — ENCOUNTER SYMPTOMS
ARTHRALGIAS: 0
DIAPHORESIS: 0
FEVER: 0
FATIGUE: 0
AGITATION: 0
CONSTIPATION: 0
WOUND: 0
DIARRHEA: 0
BACK PAIN: 0
ADENOPATHY: 0
DIFFICULTY URINATING: 0
COUGH: 0
SHORTNESS OF BREATH: 0
EYE DISCHARGE: 0
POLYDIPSIA: 0
CHILLS: 0

## 2024-05-24 ASSESSMENT — PAIN - FUNCTIONAL ASSESSMENT
PAIN_FUNCTIONAL_ASSESSMENT: 0-10

## 2024-05-24 ASSESSMENT — ACTIVITIES OF DAILY LIVING (ADL)
LACK_OF_TRANSPORTATION: NO
LACK_OF_TRANSPORTATION: NO
ADL_ASSISTANCE: INDEPENDENT
ADL_ASSISTANCE: INDEPENDENT
BATHING_ASSISTANCE: MINIMAL
HOME_MANAGEMENT_TIME_ENTRY: 17

## 2024-05-24 ASSESSMENT — PAIN SCALES - GENERAL
PAINLEVEL_OUTOF10: 0 - NO PAIN

## 2024-05-24 ASSESSMENT — VISUAL ACUITY: VA_NORMAL: 1

## 2024-05-24 NOTE — PROGRESS NOTES
05/24/24 1222   Discharge Planning   Living Arrangements Spouse/significant other   Support Systems Spouse/significant other   Assistance Needed Alert and oriented x 3, Independent with ADL's, Drives, Cane, Rollator x 2, Raised toilet with rails, Shower chair, Grab bars, Room air at baseline   Type of Residence Private residence   Number of Stairs to Enter Residence 2   Number of Stairs Within Residence 12   Do you have animals or pets at home? Yes   Type of Animals or Pets 1 cat   Who is requesting discharge planning? Provider   Home or Post Acute Services In home services   Type of Home Care Services Home PT;Home OT   Patient expects to be discharged to: Home with spouse, with new [facility] for PT/OT, patient is observation and has traditional Medicare A&B, patient is unable to finacially pay privately for skilled rehab facility   Does the patient need discharge transport arranged? No   Financial Resource Strain   How hard is it for you to pay for the very basics like food, housing, medical care, and heating? Not hard   Housing Stability   In the last 12 months, was there a time when you were not able to pay the mortgage or rent on time? N   In the last 12 months, how many places have you lived? 1   In the last 12 months, was there a time when you did not have a steady place to sleep or slept in a shelter (including now)? N   Transportation Needs   In the past 12 months, has lack of transportation kept you from medical appointments or from getting medications? no   In the past 12 months, has lack of transportation kept you from meetings, work, or from getting things needed for daily living? No   Patient Choice   Provider Choice list and CMS website (https://medicare.gov/care-compare#search) for post-acute Quality and Resource Measure Data were provided and reviewed with: Family;Patient   Patient / Family choosing to utilize agency / facility established prior to hospitalization No

## 2024-05-24 NOTE — CONSULTS
Inpatient consult to Neurology  Consult performed by: Audie Martinez MD  Consult ordered by: DO YISEL Bustos C : Poor gait and balance      History Of Present Illness  Kevin Donato is a 84 y.o. male presenting with chronic left lower face weakness, who is admitted with acute onset dizziness, poor ability to focus and think and talk associated with falls and poor balance. It started after he had very busy morning when he mowed the lawn and when arrived he started looking at newspaper and while reading he could not focus then all symptoms began.     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    Iron deficiency anemia due to chronic blood loss    Otalgia, right ear 04/28/2016    Right ear pain    Other cholangitis (CMS-HCC) 02/14/2022    Ascending cholangitis    Other iron deficiency anemias 12/16/2016    Other iron deficiency anemia    Other skin changes 10/13/2017    Vesicular rash    Pain in right shoulder 10/07/2016    Right shoulder pain    Parkinson's disease (Multi) 03/23/2020    Parkinsonism    Personal history of diseases of the skin and subcutaneous tissue 02/28/2019    History of urticaria    Personal history of other diseases of the circulatory system     History of hypertension    Personal history of other diseases of the digestive system 08/10/2018    History of pancreatitis    Personal history of other diseases of the musculoskeletal system and connective tissue 02/26/2020    History of polymyalgia rheumatica    Personal history of other diseases of the nervous system and sense organs 05/21/2018     History of hydrocephalus    Personal history of other diseases of the nervous system and sense organs 04/07/2015    History of eustachian tube dysfunction    Personal history of other diseases of the respiratory system 01/10/2019    History of sinusitis    Personal history of other diseases of urinary system 11/12/2014    History of hematuria    Personal history of other specified conditions 10/16/2017    History of edema    Personal history of other specified conditions 01/11/2022    History of jaundice    Type 2 diabetes mellitus without complications (Multi) 11/29/2022    Diabetes mellitus          Past Surgical History:   Procedure Laterality Date    HERNIA REPAIR  02/14/2017    Hernia Repair    NOSE SURGERY  02/14/2017    Nose Surgery    OTHER SURGICAL HISTORY  05/24/2022    Cholecystectomy        Social History  Social History     Tobacco Use    Smoking status: Never    Smokeless tobacco: Never   Substance Use Topics    Alcohol use: Never    Drug use: Never     Allergies  Lisinopril  Medications Prior to Admission   Medication Sig Dispense Refill Last Dose    cholecalciferol (Vitamin D-3) 50 mcg (2,000 unit) capsule Take 1 capsule (50 mcg) by mouth once daily in the morning.   5/23/2024 at am    cyanocobalamin, vitamin B-12, (Vitamin B-12) 1,000 mcg tablet extended release Take 2 tablets (2,000 mcg) by mouth once daily in the morning. As directed   5/23/2024 at am    gabapentin (Neurontin) 100 mg capsule TAKE TWO CAPSULES BY MOUTH TWICE DAILY (Patient taking differently: Take 2 capsules (200 mg) by mouth 2 times a day. Last [facility] fill: 5/14/24 #120 for 30 days) 120 capsule 2 5/23/2024 at am    HumaLOG KwikPen Insulin 100 unit/mL injection INJECT 3 to 7 UNITS 3 times daily 15 mL 3 5/23/2024 at am    insulin glargine (Lantus Solostar U-100 Insulin) 100 unit/mL (3 mL) pen Inject 35 Units under the skin once daily at bedtime. Take as directed per insulin instructions. (Patient taking differently: Inject 35 Units  "under the skin once daily in the morning. Take as directed per insulin instructions.) 100 each 2 5/23/2024 at am    losartan (Cozaar) 50 mg tablet TAKE 1 TABLET DAILY. (Patient taking differently: Take 1 tablet (50 mg) by mouth once daily in the morning.) 90 tablet 3 5/23/2024 at am    mirabegron (Myrbetriq) 25 mg tablet extended release 24 hr 24 hr tablet Take 1 tablet (25 mg) by mouth once daily. [id number]  12/2025 (Patient taking differently: Take 1 tablet (25 mg) by mouth once daily in the morning. [id number]  12/2025) 42 tablet 0 5/23/2024 at am    multivitamin tablet Take 1 tablet by mouth once daily in the morning.   5/23/2024 at am    omeprazole (PriLOSEC) 20 mg DR capsule TAKE 1 CAPSULE BY MOUTH EVERY DAY (Patient taking differently: Take 1 capsule (20 mg) by mouth once daily in the morning.) 90 capsule 3 5/23/2024 at am    simvastatin (Zocor) 20 mg tablet TAKE ONE TABLET BY MOUTH EVERY DAY IN THE EVENING 90 tablet 2 5/22/2024 at pm    tamsulosin (Flomax) 0.4 mg 24 hr capsule Take 1 capsule (0.4 mg) by mouth once daily at bedtime. 30 capsule 11 5/22/2024 at pm    acetaminophen (Tylenol) 325 mg tablet Take 2 tablets (650 mg) by mouth every 4 hours if needed.   Unknown    blood sugar diagnostic (Contour Test Strips) strip Test blood sugar twice a day       blood sugar diagnostic (True Metrix Glucose Test Strip) strip Test 4 times daily due to hypoglycemia       fluticasone (Flonase) 50 mcg/actuation nasal spray Administer 1 spray into each nostril once daily as needed for rhinitis. Shake gently. Before first use, prime pump. After use, clean tip and replace cap.   Unknown    lancets 26 gauge misc Easy touch lancets 26G/twist MISC---check sugar twice daily       omega 3-dha-epa-fish oil 1,200 (144-216) mg capsule Take 1 capsule (1,200 mg) by mouth once daily.   Unknown    pen needle, diabetic (Sure Comfort Pen Needle) 32 gauge x 5/32\" needle Take as directed. Use daily for lantus and 3times a day for humalog " coverage 400 each 3     [employer] At-Home COVID-19 Test kit AS DIRECTED       triamcinolone (Kenalog) 0.1 % cream Apply topically 2 times a day. Apply to affected area 1-2 times daily as needed. Avoid face and groin. 30 g 0 Unknown       Review of Systems   Constitutional:  Negative for chills, diaphoresis, fatigue and fever.   HENT:  Negative for congestion.    Eyes:  Negative for discharge.   Respiratory:  Negative for cough and shortness of breath.    Cardiovascular:  Negative for chest pain.   Gastrointestinal:  Negative for constipation and diarrhea.   Endocrine: Negative for polydipsia.   Genitourinary:  Negative for difficulty urinating.   Musculoskeletal:  Negative for arthralgias and back pain.   Skin:  Negative for pallor and wound.   Allergic/Immunologic: Negative for environmental allergies.   Neurological:         Chronic left lower facial weakness   Hematological:  Negative for adenopathy.   Psychiatric/Behavioral:  Negative for agitation and behavioral problems.          Cardiovascular system: S1-S2 intact  Respiratory clear to auscultation bilateral on deep breathing he feels irritability on the left side of the chest.    Neurological Exam  Mental Status  Awake, alert and oriented to person, place and time. Recent and remote memory are intact. Able to copy figure. Clock drawing is normal. Speech is normal. Able to name objects, name parts of objects, repeat, read and write. Follows three-step commands. Able to perform serial calculations. Able to spell words backwards. Fund of knowledge is appropriate for level of education.    Cranial Nerves  CN II: Visual acuity is normal. Right funduscopic exam: disc intact. Left funduscopic exam: disc intact.  CN III, IV, VI: Extraocular movements intact bilaterally. Normal lids and orbits bilaterally.   Right pupil: Round. Reactive to light. Reactive to accommodation.  CN V:  Right: Facial sensation is normal.  Left: Facial sensation is normal on the left.  CN  "VII: Full and symmetric facial movement.  CN VIII:  Right: Hearing is normal.  Left: Hearing is normal.  CN IX, X:  Right: Palate is normal.  Left: Palate is normal.  CN XI:  Right: Sternocleidomastoid strength is normal.  Left: Sternocleidomastoid strength is normal.  CN XII: Tongue midline without atrophy or fasciculations.    Motor  Normal muscle bulk throughout. No fasciculations present. Normal muscle tone. No abnormal involuntary movements. Strength is 5/5 throughout all four extremities.    Sensory  Light touch is normal in upper and lower extremities. Pinprick is normal in upper and lower extremities. Temperature is normal in upper and lower extremities. Vibration is normal in upper and lower extremities.     Reflexes  Deep tendon reflexes are 2+ and symmetric in all four extremities.  Jaw jerk absent.  Right pathological reflexes: Austin's absent.  Left pathological reflexes: Austin's absent.  Glabellar tap absent. Snout absent. Right palmomental absent. Left palmomental absent. Right palmar grasp absent. Left palmar grasp absent.    Coordination  Right: Finger-to-nose normal. Rapid alternating movement normal. Heel-to-shin normal.Left: Finger-to-nose normal. Rapid alternating movement normal. Heel-to-shin normal.    Gait  Casual gait: Wide stance. Reduced stride length. Ataxic gait. Able to rise from chair without using arms.  Needed lot of assistance to sit up and then walk with a walker and took him several attempts to get up and walk.        Last Recorded Vitals  Blood pressure (!) 196/65, pulse 50, temperature 36.3 °C (97.3 °F), temperature source Temporal, resp. rate 18, height 1.778 m (5' 10\"), weight 93.4 kg (205 lb 14.6 oz), SpO2 98%.    Relevant Results      Current Facility-Administered Medications:     acetaminophen (Tylenol) tablet 650 mg, 650 mg, oral, q4h PRN, Heather Cramer, DO    aspirin chewable tablet 81 mg, 81 mg, oral, Daily, Melany Xiao, DO    atorvastatin (Lipitor) tablet 80 " mg, 80 mg, oral, Nightly, Melany Xiao DO    cholecalciferol (Vitamin D-3) tablet 2,000 Units, 2,000 Units, oral, q AM, Heather Cramer DO    cyanocobalamin (Vitamin B-12) tablet 2,000 mcg, 2,000 mcg, oral, Daily, Heather Cramer DO    dextrose 50 % injection 12.5 g, 12.5 g, intravenous, q15 min PRN, Velvet Eaton DO    dextrose 50 % injection 25 g, 25 g, intravenous, q15 min PRN, Velvet Eaton DO    fluticasone (Flonase) nasal spray 1 spray, 1 spray, Each Nostril, Daily PRN, Heather Cramer DO    gabapentin (Neurontin) capsule 200 mg, 200 mg, oral, BID, Heather Cramer DO, 200 mg at 05/23/24 2042    glucagon (Glucagen) injection 1 mg, 1 mg, intramuscular, q15 min PRN, Velvet Eaton DO    glucagon (Glucagen) injection 1 mg, 1 mg, intramuscular, q15 min PRN, Velvet Eaton DO    [Held by provider] heparin (porcine) injection 5,000 Units, 5,000 Units, subcutaneous, q8h, Melany Xiao DO    icosapent ethyL (Vascepa) capsule 1,000 mg, 1,000 mg, oral, Daily, Heather Cramer DO    insulin glargine (Lantus) injection 35 Units, 35 Units, subcutaneous, q AM, Heather Cramer DO    insulin lispro (HumaLOG) injection 0-10 Units, 0-10 Units, subcutaneous, TID, Velvet Eaton DO    [Held by provider] losartan (Cozaar) tablet 50 mg, 50 mg, oral, Daily, Heather Cramer DO    multivitamin with minerals 1 tablet, 1 tablet, oral, q AM, Heather Cramer DO    oxybutynin (Ditropan) tablet 5 mg, 5 mg, oral, BID, Heather Cramer DO, 5 mg at 05/23/24 2042    oxygen (O2) therapy, , inhalation, Continuous PRN - O2/gases, Melany Xiao DO    pantoprazole (ProtoNix) EC tablet 40 mg, 40 mg, oral, Daily before breakfast, Heather Cramer DO, 40 mg at 05/24/24 0619    [Held by provider] tamsulosin (Flomax) 24 hr capsule 0.4 mg, 0.4 mg, oral, Nightly, Heather Cramer DO     Continuous medications    PRN medications, reviewed    NIH Stroke Scale  1A. Level of Consciousness: Alert, Keenly Responsive  1B. Ask Month and Age: Both  Questions Right  1C. Blink Eyes & Squeeze Hands: Performs Both Tasks  2. Best Gaze: Normal  3. Visual: No Visual Loss  4. Facial Palsy: Normal Symmetrical Movements  5A. Motor - Left Arm: No Drift  5B. Motor - Right Arm: No Drift  6A. Motor - Left Leg: No Drift  6B. Motor - Right Leg: No Drift  7. Limb Ataxia: Absent  8. Sensory Loss: Normal  9. Best Language: No Aphasia  10. Dysarthria: Normal  11. Extinction and Inattention: No Abnormality  NIH Stroke Scale: 0           [place] Coma Scale  Best Eye Response: Spontaneous  Best Verbal Response: Oriented  Best Motor Response: Follows commands  [place] Coma Scale Score: 15                       I have personally reviewed the following imaging for brain (CT/ MR) and results and discussed in detail with the patient/care giver/family :     IMPRESSION:      1. Small subacute right PICA territory infarct associated with a  nonvisualized extracranial and proximal intracranial right vertebral  artery indicative of occlusion.  2. Aforementioned is superimposed upon a larger chronic right PICA  territory infarct which is new from MRI brain 03/15/2018.  3. Mild periventricular and subcortical hemispheric T2/FLAIR white  matter hyperintensities are most compatible with chronic small vessel  ischemic disease.  4. Borderline disproportionate ventriculomegaly which can be seen  with normal pressure hydrocephalus.      MACRO:  None.          Assessment/Plan   Acute to subacute stroke:    1- Small subacute right PICA territory infarct    Rule out Cardiac arrhythmias such as A Fib    Acute rehab and permissive HTN SBP up to 200 mmHg for initial 24 hours     ASA and Plavix    Statin Therapy        Patient/Family Education: Extensive time was spent educating the patient on relevant anatomy, clinical findings and imaging, as well as discussing the potential diagnoses as discussed above.  Pharmacology: as above. Exercise: I discussed the importance of maintaining a daily exercise program,  including stretching and strengthening. Preventative strategies were reviewed, specifically avoidance of any exercises that exacerbate pain.Return to online virtual visit/ clinic visit for follow-up with  Neurology in 3 weeks or sooner as needed.The patient expressed understanding and agreement with the assessment and plan.  Patient encouraged to contact us should they have any questions, concerns, or any changes in symptoms. Thank you for allowing me to participate in the care of your patient.** This note is created using speech recognition transcription software. Despite proofreading, several typographical errors might be present that might affect the meaning of the content. Please call with any questions.**          Audie Martinez MD

## 2024-05-24 NOTE — PROGRESS NOTES
Subjective    No acute events overnight.  Patient reports feeling fine today.  Admits that he does have chronic urinary incontinence at times, as well as chronic wobbly gait at times.  Neurology to see today.  Denies chest pain,shortness of breath, fever, chills, abdominal pain.       Objective    Physical Exam  Gen: well appearing, in NAD  HEENT: AT/NC, no conjunctival pallor, anicteric sclera, EOMI   Neck: supple, full range of motion, nontender to palpation of neck musculature and C-spine.  No LAD/JVD  Chest: CTAB, no wheezing, crackles, rales, rhonchi.  No respiratory distress on room air  CVS: RRR, no murmurs  Abd: soft, distended, bowel sounds in all 4 quadrants, nontender to palpation in all 4 quadrants  Ext: Mild bilateral lower extremity edema, chronic, improved compared to yesterday.  No cyanosis/clubbing, no erythema, negative Homans' sign. nontender to palpation.  Equal and symmetric movement of all 4 extremities.    Neuro: AOx3, CN 2-12 intact, full range of motion and motor 5/5 globally, sensation intact.  No dysdiadochokinesia, no dysmetria, no ataxia.  Of note, left-sided mild facial skin droop is chronic, facial muscles are equal and symmetric bilaterally.     Temp:  [36 °C (96.8 °F)-36.6 °C (97.9 °F)] 36 °C (96.8 °F)  Heart Rate:  [50-78] 56  Resp:  [14-20] 18  BP: (170-201)/(64-88) 172/70    Vitals:    05/23/24 2044   Weight: 93.4 kg (205 lb 14.6 oz)             I/Os    Intake/Output Summary (Last 24 hours) at 5/24/2024 1454  Last data filed at 5/24/2024 0813  Gross per 24 hour   Intake 600 ml   Output 875 ml   Net -275 ml       Labs:   Results from last 72 hours   Lab Units 05/24/24  0534 05/23/24  1408   SODIUM mmol/L 140 139   POTASSIUM mmol/L 4.3 4.2   CHLORIDE mmol/L 106 105   CO2 mmol/L 27 25   BUN mg/dL 18 22   CREATININE mg/dL 1.42* 1.42*   GLUCOSE mg/dL 178* 222*   CALCIUM mg/dL 9.1 9.1   ANION GAP mmol/L 11 13   EGFR mL/min/1.73m*2 48* 48*      Results from last 72 hours   Lab Units  "05/24/24  0534 05/23/24  1408   WBC AUTO x10*3/uL 7.6 7.0   HEMOGLOBIN g/dL 13.3* 13.3*   HEMATOCRIT % 40.0* 39.1*   PLATELETS AUTO x10*3/uL 161 169   NEUTROS PCT AUTO %  --  71.9   LYMPHS PCT AUTO %  --  16.0   MONOS PCT AUTO %  --  6.9   EOS PCT AUTO %  --  4.1      Lab Results   Component Value Date    CALCIUM 9.1 05/24/2024    PHOS 2.3 (L) 02/10/2022      Lab Results   Component Value Date    CRP 0.12 10/18/2017        Micro/ID:   No results found for the last 90 days.                   No lab exists for component: \"AGALPCRNB\"     No results found for: \"URINECULTURE\", \"BLOODCULT\", \"CSFCULTSMEAR\"    Images:  Transthoracic Echo (TTE) Complete     [facility], [address] [place] [address]                Tel [phone] and Fax [phone]    TRANSTHORACIC ECHOCARDIOGRAM REPORT       Patient Name:      IRINA AYANA Gutierrez Physician:    [id number] Ralph Blandon MD  Study Date:        5/24/2024            Ordering Provider:    [id number] HAMMAD KUMAR  MRN/PID:           [id number]             Fellow:  Accession#:        [id number]         Nurse:                Molly Hendricks RN  Date of Birth/Age: 1940 / 84 years  Sonographer:          Rosemary Bartholomew RDCS  Gender:            M                    Additional Staff:  Height:            177.80 cm            Admit Date:  Weight:            92.99 kg             Admission Status:     Inpatient -                                                                Routine  BSA / BMI:         2.11 m2 / 29.41      Encounter#:           [id number]                     kg/m2                                          Department Location:  [facility], [facility]"              Invasive  Blood Pressure: 170 /64 mmHg    Study Type:    TRANSTHORACIC ECHO (TTE) COMPLETE  Diagnosis/ICD: Transient cerebral ischemic attack, unspecified (NOT on                 LCD)-G45.9  Indication:    Transischemic Attack  CPT Code:      Echo Complete w Full Doppler-[id number]    Patient History:  Diabetes:          Yes  Pertinent History: HTN, Hyperlipidemia and CKD, right side weakness.    Study Detail: The following Echo studies were performed: 2D, M-Mode, color flow                and Doppler. Agitated saline used as a contrast agent for                intraseptal flow evaluation. The patient was awake.       PHYSICIAN INTERPRETATION:  Left Ventricle: The left ventricular systolic function is normal, with an estimated ejection fraction of 55%. There are no regional wall motion abnormalities. The left ventricular cavity size is upper limits of normal. There is concentric left ventricular hypertrophy. Spectral Doppler shows an impaired relaxation pattern of left ventricular diastolic filling.  Left Atrium: The left atrium is normal in size. A bubble study using agitated saline was performed. Bubble study is negative.  Right Ventricle: The right ventricle is normal in size. There is normal right ventricular global systolic function.  Right Atrium: The right atrium is normal in size.  Aortic Valve: The aortic valve is trileaflet. There is minimal aortic valve cusp calcification. There is no evidence of aortic valve stenosis.  There is mild aortic valve regurgitation. The peak instantaneous gradient of the aortic valve is 8.7 mmHg. The mean gradient of the aortic valve is 4.9 mmHg.  Mitral Valve: The mitral valve is mildly thickened. There is trace to mild mitral valve regurgitation.  Tricuspid Valve: The tricuspid valve is structurally normal. There is trace tricuspid regurgitation. The right ventricular systolic pressure is unable to be estimated.  Pulmonic Valve: The pulmonic valve is structurally  normal. There is trace to mild pulmonic valve regurgitation.  Pericardium: There is no pericardial effusion noted.  Aorta: The aortic root is abnormal. There is mild dilatation of the ascending aorta. The aortic root is at the upper limits of normal size.  Systemic Veins: The inferior vena cava appears to be of normal size. There is IVC inspiratory collapse greater than 50%.  In comparison to the previous echocardiogram(s): There are no prior studies on this patient for comparison purposes.       CONCLUSIONS:   1. Left ventricular systolic function is normal with a 55% estimated ejection fraction.   2. Spectral Doppler shows an impaired relaxation pattern of left ventricular diastolic filling.   3. Mild aortic valve regurgitation.   4. There is mild dilatation of the ascending aorta.    QUANTITATIVE DATA SUMMARY:  2D MEASUREMENTS:                            Normal Ranges:  IVSd:          1.31 cm    (0.6-1.1cm)  LVPWd:         1.30 cm    (0.6-1.1cm)  LVIDd:         5.15 cm    (3.9-5.9cm)  LVIDs:         3.54 cm  LV Mass Index: 130.8 g/m2  LV % FS        31.3 %    LA VOLUME:                                Normal Ranges:  LA Vol A4C:        36.9 ml    (22+/-6mL/m2)  LA Vol A2C:        43.9 ml  LA Vol BP:         43.0 ml  LA Vol Index A4C:  17.5ml/m2  LA Vol Index A2C:  20.8 ml/m2  LA Vol Index BP:   20.4 ml/m2  LA Area A4C:       15.6 cm2  LA Area A2C:       15.9 cm2  LA Major Axis A4C: 5.6 cm  LA Major Axis A2C: 4.9 cm  LA Volume Index:   20.5 ml/m2  LA Vol A4C:        33.7 ml  LA Vol A2C:        40.8 ml    RA VOLUME BY A/L METHOD:                        Normal Ranges:  RA Area A4C: 15.0 cm2    AORTA MEASUREMENTS:                       Normal Ranges:  Ao Sinus, d: 3.70 cm (2.1-3.5cm)  Asc Ao, d:   4.10 cm (2.1-3.4cm)    LV SYSTOLIC FUNCTION BY 2D PLANIMETRY (MOD):                      Normal Ranges:  EF-A4C View: 57.4 % (>=55%)  EF-A2C View: 55.6 %  EF-Biplane:  54.6 %    LV DIASTOLIC FUNCTION:                                 Normal Ranges:  MV Peak E:        0.70 m/s    (0.7-1.2 m/s)  MV Peak A:        1.06 m/s    (0.42-0.7 m/s)  E/A Ratio:        0.66        (1.0-2.2)  MV e'             0.05 m/s    (>8.0)  MV lateral e'     0.05 m/s  MV medial e'      0.07 m/s  MV A Dur:         124.57 msec  E/e' Ratio:       13.93       (<8.0)  PulmV Sys Bernard:    66.89 cm/s  PulmV Hoff Bernard:   32.01 cm/s  PulmV S/D Bernard:    2.09  PulmV A Revs Bernard: 26.67 cm/s  PulmV A Revs Dur: 128.03 msec    MITRAL VALVE:                  Normal Ranges:  MV DT: 316 msec (150-240msec)    AORTIC VALVE:                                    Normal Ranges:  AoV Vmax:                1.48 m/s (<=1.7m/s)  AoV Peak P.7 mmHg (<20mmHg)  AoV Mean P.9 mmHg (1.7-11.5mmHg)  LVOT Max Bernard:            1.08 m/s (<=1.1m/s)  AoV VTI:                 38.43 cm (18-25cm)  LVOT VTI:                27.53 cm  LVOT Diameter:           2.04 cm  (1.8-2.4cm)  AoV Area, VTI:           2.26 cm2 (2.5-5.5cm2)  AoV Area,Vmax:           2.30 cm2 (2.5-4.5cm2)  AoV Dimensionless Index: 0.72    AORTIC INSUFFICIENCY:  AI Vmax:       4.55 m/s  AI Half-time:  853 msec  AI Decel Time: 2942 msec  AI Decel Rate: 156.37 cm/s2       RIGHT VENTRICLE:  RV Basal 3.00 cm  RV Mid   2.10 cm  RV Major 7.7 cm  TAPSE:   26.0 mm  RV s'    0.13 m/s    TRICUSPID VALVE/RVSP:                              Normal Ranges:  Peak TR Velocity: 2.63 m/s  RV Syst Pressure: 30.7 mmHg (< 30mmHg)  IVC Diam:         1.70 cm    PULMONIC VALVE:                       Normal Ranges:  PV Max Bernard: 0.9 m/s  (0.6-0.9m/s)  PV Max PG:  3.5 mmHg    Pulmonary Veins:  PulmV A Revs Dur: 128.03 msec  PulmV A Revs Bernard: 26.67 cm/s  PulmV Hoff Bernard:   32.01 cm/s  PulmV S/D Bernard:    2.09  PulmV Sys Bernard:    66.89 cm/s    AORTA:  Asc Ao Diam 4.18 cm       [id number] Ralph Blandon MD  Electronically signed on 2024 at 12:55:37 PM       ** Final **       Meds:  Scheduled medications  aspirin, 81 mg, oral, Daily  atorvastatin, 80 mg,  oral, Nightly  cholecalciferol, 2,000 Units, oral, q AM  clopidogrel, 75 mg, oral, Daily  cyanocobalamin, 2,000 mcg, oral, Daily  gabapentin, 200 mg, oral, BID  heparin (porcine), 5,000 Units, subcutaneous, q8h  icosapent ethyL, 1,000 mg, oral, Daily  insulin glargine, 35 Units, subcutaneous, q AM  insulin lispro, 0-10 Units, subcutaneous, TID  [Held by provider] losartan, 50 mg, oral, Daily  multivitamin with minerals, 1 tablet, oral, q AM  oxybutynin, 5 mg, oral, BID  pantoprazole, 40 mg, oral, Daily before breakfast  [Held by provider] tamsulosin, 0.4 mg, oral, Nightly      Continuous medications     PRN medications  PRN medications: acetaminophen, dextrose, dextrose, fluticasone, glucagon, glucagon, oxygen     Assessment    Kevin Donato is a 84 y.o. male with PMH of insulin-dependent T2DM, CKD 3, BPH, HTN, HLD, admitted for TIA and stroke workup.      Acute Medical Issues   #TIA  #Right UE and LE weakness - resolved  -LDL 78, triglycerides 143  - Hemoglobin A1c pending  - MRI brain w/o IV contrast, and MRA head and neck w/o IV contrast: Small subacute right PICA territory infarct associated with a nonvisualized extracranial and proximal intracranial right vertebral  artery indicative of occlusion. 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 chronic small vessel ischemic disease.Borderline disproportionate ventriculomegaly which can be seen with normal pressure hydrocephalus  -Neurology consulted  - TTE: EF 55%, mild aortic valve regurg, mild dilatation of ascending aorta  - PT: Mod intensity  - OT: high intensity  - s/p Aspirin 325 mg p.o. x 1  - Aspirin 81 mg p.o. daily  - Plavix 75 mg po daily  - Atorvastatin 80 mg p.o. nightly  -Aspiration cautions  - Telemetry and pulse ox monitoring  - Neurochecks every hour until stroke rule out  - Tylenol 650 mg p.o. every 4 hours as needed for mild pain or headache     #HTN  - Permissive hypertension up to  mmHg for  initial 24 hours  - Holding losartan 50 mg p.o. daily     Chronic Medical Issues   #Insulin-dependent T2DM  - home regimen: Lantus 35 units every morning  -Lispro sliding scale 0-10 units 3 times daily with meals     #Peripheral neuropathy  - Gabapentin 200 mg p.o. twice daily     #BPH  - Oxybutynin 5 mg p.o. twice daily  - Holding tamsulosin 0.4 p.o. nightly     #vitamin supplementation  -Vitamin D3 2000 units p.o. daily  - Vitamin B12 2000 mcg daily  - Omega-3 daily  - Multivitamin daily     IVF: PRN   Electrolytes: Replete as needed   Diet: Carb controlled  GI ppx: Protonix  DVT ppx: Heparin sub Q  Antibiotics: none  Code: Full Code      Disposition: 84 y.o.male admitted for TIA and stroke workup.  neurology consulted, DAPT,  PT/OT on board.  Estimated length of stay less than 48 hrs    Heather Cramer DO

## 2024-05-24 NOTE — CARE PLAN
The patient's goals for the shift include comfort and sleep    The clinical goals for the shift include neuro status will remain stable and pt will remain safe this shift

## 2024-05-24 NOTE — PROGRESS NOTES
05/24/24 1219   Discharge Planning   Home or Post Acute Services In home services   Type of Home Care Services Home OT;Home PT   Patient expects to be discharged to: Home with spouse, with new [facility] for PT/OT, patient is observation and has traditional Medicare A&B, patient is unable to finacially pay privately for skilled rehab facility   Does the patient need discharge transport arranged? No   Financial Resource Strain   How hard is it for you to pay for the very basics like food, housing, medical care, and heating? Not hard   Housing Stability   In the last 12 months, was there a time when you were not able to pay the mortgage or rent on time? N   In the last 12 months, how many places have you lived? 1   In the last 12 months, was there a time when you did not have a steady place to sleep or slept in a shelter (including now)? N   Transportation Needs   In the past 12 months, has lack of transportation kept you from medical appointments or from getting medications? no   In the past 12 months, has lack of transportation kept you from meetings, work, or from getting things needed for daily living? No   Patient Choice   Provider Choice list and CMS website (https://medicare.gov/care-compare#search) for post-acute Quality and Resource Measure Data were provided and reviewed with: Family;Patient   Patient / Family choosing to utilize agency / facility established prior to hospitalization No        MDM: 8 yo male with hx of asthma presents with 1 day of cough and congestion. +seasonal allergies, given albuterol q4hr nebs without improvement. Last alb at 2pm. Physical exam saturating 99%, RR 28, clear throughout, RRR + belly breathing. Concern for asthma exacerbation so will give combinebs, decadron and RVP before reassessment. Delores Lynne MD MDM: 8 yo male with hx of asthma presents with 1 day of cough and congestion. +seasonal allergies, given albuterol q4hr nebs without improvement. Last alb at 2pm. Physical exam saturating 99%, RR 28, clear throughout, RRR + belly breathing. Concern for asthma exacerbation so will give combinebs, decadron and RVP before reassessment. MD Claus Gregg MD:  8 yr old asthma exacerbation. increased cough, congestion, difficulty breathing. tachypneic on arrival. diminished breath sounds. tachypneic. RSS 8 on arrival. 3 albuterol/atrovent. decadron. serial exams. plan to space to q3 at 1245, discharge home. signed out at end of shift with plan to reassess and dispo home at q3 if tolerated.

## 2024-05-24 NOTE — PROGRESS NOTES
Occupational Therapy    Evaluation    Patient Name: Kevin Donato  MRN: [id number]  Today's Date: 5/24/2024  Time Calculation  Start Time: 1317  Stop Time: 1349  Time Calculation (min): 32 min    Assessment  IP OT Assessment  OT Assessment: Pt presents with R side weakness/neglect, R side balance deficits, decreased functional mobility, decreased ADL, decreased safety awareness. Continued skilled OT recommended to maximize pt safety and independence prior to returning home.  Prognosis: Good  Barriers to Discharge: None  Evaluation/Treatment Tolerance: Patient tolerated treatment well  Medical Staff Made Aware: Yes  End of Session Communication: Bedside nurse  End of Session Patient Position: Bed, 3 rail up, Alarm on  Plan:  Treatment Interventions: ADL retraining, Functional transfer training, Endurance training, Compensatory technique education, Neuromuscular reeducation  OT Frequency: 4 times per week  OT Discharge Recommendations: High intensity level of continued care  Equipment Recommended upon Discharge: Wheeled walker  OT Recommended Transfer Status: Assist of 2  OT - OK to Discharge: Yes (per OT POC)    Subjective   Current Problem:  1. TIA (transient ischemic attack)  Transthoracic Echo (TTE) Complete    Transthoracic Echo (TTE) Complete    Referral to [facility]        General:  General  Reason for Referral: 85 yo male referred to OT for TIA, impaired ADL, impaired mobility  Referred By: Melany Xiao,   Past Medical History Relevant to Rehab: T2DM, CKD 3, BPH, HTN, HLD  Family/Caregiver Present: Yes  Caregiver Feedback: spouse present, able to confirm PLOF and discuss POC  Prior to Session Communication: Bedside nurse  Patient Position Received: Bed, 3 rail up, Alarm on  General Comment: Pt pleasant, cooperative with OT evaluation.  Precautions:  Medical Precautions: Fall precautions     Pain:  Pain Assessment  Pain Assessment: 0-10  Pain Score: 0 - No pain    Objective    Cognition:  Overall Cognitive Status: Impaired  Orientation Level: Disoriented to situation  Insight: Severe  Impulsive: Severely     Home Living:  Type of Home: House  Lives With: Spouse  Home Adaptive Equipment: Cane (rollator)  Home Layout: One level  Home Access: Stairs to enter without rails  Entrance Stairs-Rails: None  Bathroom Shower/Tub: Tub/shower unit  Bathroom Toilet: Adaptive toilet seating  Bathroom Equipment: Shower chair with back, Raised toilet seat with rails, Grab bars in shower   Prior Function:  Level of [place]: Independent with ADLs and functional transfers, Independent with homemaking with ambulation  Receives Help From: Family  ADL Assistance: Independent  Homemaking Assistance: Independent (IADL shared with wife)  Ambulatory Assistance: Independent (with cane)     ADL:  Eating Assistance: Independent  Grooming Assistance: Stand by  Bathing Assistance: Minimal  UE Dressing Assistance: Independent  LE Dressing Assistance: Maximal  Toileting Assistance with Device: Moderate  Functional Assistance: Maximal  ADL Comments: Unassessed ADL performance anticipated d/t current clinical presentation  Activity Tolerance:  Endurance: Tolerates 10 - 20 min exercise with multiple rests  Bed Mobility/Transfers: Bed Mobility  Bed Mobility: Yes  Bed Mobility 1  Bed Mobility 1: Supine to sitting, Sitting to supine  Level of Assistance 1: Close supervision  Bed Mobility Comments 1: HOB elevated, use of bed rail    Transfers  Transfer: Yes  Transfer 1  Transfer From 1: Sit to  Transfer to 1: Stand  Technique 1: Sit to stand, Stand to sit  Transfer Device 1: Walker  Transfer Level of Assistance 1: Contact guard      Functional Mobility:  Functional Mobility  Functional Mobility Performed: Yes  Functional Mobility 1  Surface 1: Level tile  Device 1: Rolling walker  Assistance 1: Maximum assistance  Quality of Functional Mobility 1: Inconsistent stride length  Comments 1: Ambulated household distance from  bed>bathroom>hallway with max assist for upright balance and safety. Pt with R side neglect, often dragging R foot. Pt very impulsive with ambulation.  Sitting Balance:  Static Sitting Balance  Static Sitting-Balance Support: Feet supported  Static Sitting-Level of Assistance: Minimum assistance  Static Sitting-Comment/Number of Minutes: R side lean, cues needed to correct  Standing Balance:  Static Standing Balance  Static Standing-Balance Support: Bilateral upper extremity supported  Static Standing-Level of Assistance: Moderate assistance  Static Standing-Comment/Number of Minutes: R side lean, R side trunk/postural weakness    Sensation:  Light Touch: No apparent deficits  Strength:  Strength Comments: BUE grossly 4+/5  Perception:  Inattention/Neglect: Cues to maintain midline in sitting, Cues to maintain midline in standing  Coordination:  Movements are Fluid and Coordinated: Yes   Hand Function:  Hand Function  Gross Grasp: Functional  Coordination: Functional  Extremities: RUE   RUE : Within Functional Limits and LUE   LUE: Within Functional Limits    Outcome Measures: [facility] Daily Activity  Putting on and taking off regular lower body clothing: A lot  Bathing (including washing, rinsing, drying): A lot  Putting on and taking off regular upper body clothing: A little  Toileting, which includes using toilet, bedpan or urinal: A lot  Taking care of personal grooming such as brushing teeth: A little  Eating Meals: None  Daily Activity - Total Score: 16      Education Documentation  Body Mechanics, taught by Samuel Lorenzo OT at 5/24/2024  2:11 PM.  Learner: Family, Patient  Readiness: Acceptance  Method: Explanation  Response: Verbalizes Understanding    Precautions, taught by Samuel Lorenzo OT at 5/24/2024  2:11 PM.  Learner: Family, Patient  Readiness: Acceptance  Method: Explanation  Response: Verbalizes Understanding    ADL Training, taught by Samuel Lorenzo OT at 5/24/2024  2:11 PM.  Learner:  Family, Patient  Readiness: Acceptance  Method: Explanation  Response: Verbalizes Understanding    Education Comments  No comments found.      Goals:   Encounter Problems       Encounter Problems (Active)       OT Goals       Pt will increase static/dynamic stand to Good to increase safety and independence with functional task completion.   (Progressing)       Start:  05/24/24    Expected End:  06/07/24            Pt will tolerate 10min stand during functional task completion with no more than 1 rest break in order to increase endurance for functional task completion.  (Progressing)       Start:  05/24/24    Expected End:  06/07/24            Pt will demo LE ADL completion with modified independence, using AE if needed.  (Progressing)       Start:  05/24/24    Expected End:  06/07/24            Pt will complete [id number] transfers using LRD in preparation for ADLs with supervision  (Progressing)       Start:  05/24/24    Expected End:  06/07/24            Pt will increase endurance to tolerate 15min of OOB activity with no more than 1 rest break in order to increase ability to engage in ADL completion.  (Progressing)       Start:  05/24/24    Expected End:  06/07/24

## 2024-05-24 NOTE — CARE PLAN
The patient's goals for the shift include figure out what's going on with me    The clinical goals for the shift include neuro status will remain stable

## 2024-05-24 NOTE — CARE PLAN
Uneventful night. Pt rested comfortably. Neuro status remains stable. No c/o pain. Pt continues to progress towards meeting goals. VSS. Will continue to monitor.

## 2024-05-24 NOTE — PROGRESS NOTES
Physical Therapy    Physical Therapy Evaluation    Patient Name: Kevin Donato  MRN: [id number]  Today's Date: 5/24/2024   Time Calculation  Start Time: 0915  Stop Time: 0932  Time Calculation (min): 17 min    Assessment/Plan Pt is an 85yo male admitted to the hospital with stroke like symptoms. Prior to admission pt was independent with ADL's and ambulation, with use of cane to ambulate. Pt presents with generalized weakness and impaired balance effecting his ambulation and making him a fall risk. Pt may benefit from PT service at this time for strengthening and balance training to improve mobility and reduce the risk of falls.  PT Assessment  PT Assessment Results: Decreased strength, Impaired balance, Decreased mobility, Decreased safety awareness  Rehab Prognosis: Good  Evaluation/Treatment Tolerance: Patient tolerated treatment well  Medical Staff Made Aware: Yes  Strengths: Ability to acquire knowledge, Attitude of self  End of Session Communication: Bedside nurse  End of Session Patient Position: Bed, 3 rail up, Alarm on  IP OR SWING BED PT PLAN  Inpatient or Swing Bed: Inpatient  PT Plan  Treatment/Interventions: Bed mobility, Transfer training, Gait training, Balance training, Strengthening, Therapeutic activity, Therapeutic exercise, Home exercise program  PT Plan: Skilled PT  PT Frequency: 3 times per week  PT Discharge Recommendations: Moderate intensity level of continued care  Equipment Recommended upon Discharge: Wheeled walker  PT Recommended Transfer Status: Assist x1  PT - OK to Discharge: Yes      Subjective   General Visit Information:  General  Reason for Referral: Pt admitted with R sided weakness > 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  Referred By: Melany Xiao DO  Past Medical History Relevant to Rehab: PMH: T2DM, CKD 3, BPH, HTN, HLD  Patient Position Received: Bed, 3 rail up, Alarm on  General Comment: Pt pleasant, slightly impulsive with mobility  Home  Living:  Home Living  Type of Home: House  Lives With: Spouse  Home Adaptive Equipment:  (Cane, rollator)  Home Layout: One level  Home Access: Stairs to enter without rails  Entrance Stairs-Rails: None  Bathroom Shower/Tub: Tub/shower unit  Bathroom Toilet: Adaptive toilet seating  Bathroom Equipment: Shower chair with back  Home Living Comments: Pt states he has grab bars but they have not merle installed yet  Prior Level of Function:  Prior Function Per Pt/Caregiver Report  Level of [place]: Independent with ADLs and functional transfers, Independent with homemaking with ambulation  ADL Assistance: Independent  Ambulatory Assistance:  (Cane use)  Prior Function Comments: Drives  Precautions:  Precautions  Medical Precautions: Fall precautions       Objective   Pain:  Pain Assessment  Pain Assessment: 0-10  Pain Score: 0 - No pain  Cognition:  Cognition  Orientation Level: Disoriented X4    General Assessments:  General Observation  General Observation: Telemetry       Strength  Strength Comments: BLE ~4-/5  Strength  Strength Comments: BLE ~4-/5       Coordination  Movements are Fluid and Coordinated: Yes    Functional Assessments:  Bed Mobility  Bed Mobility: Yes  Bed Mobility 1  Bed Mobility 1: Supine to sitting  Level of Assistance 1: Close supervision  Bed Mobility Comments 1: use of bed rail, increased time required  Bed Mobility 2  Bed Mobility  2: Sitting to supine  Level of Assistance 2: Minimum assistance    Transfers  Transfer: Yes  Transfer 1  Transfer From 1: Bed to, Sit to  Transfer to 1: Stand  Technique 1: Sit to stand  Transfer Device 1: Cane, Walker  Transfer Level of Assistance 1: Contact guard  Transfers 2  Transfer From 2: Stand to  Transfer to 2: Sit  Technique 2: Stand to sit  Transfer Device 2: Cane, Walker  Transfer Level of Assistance 2: Contact guard    Ambulation/Gait Training  Ambulation/Gait Training Performed: Yes  Ambulation/Gait Training 1  Surface 1: Level tile  Device 1:  Single point cane  Assistance 1: Minimum assistance  Quality of Gait 1:  (Decreased jose)  Comments/Distance (ft) 1: 20'  Ambulation/Gait Training 2  Surface 2: Level tile  Device 2: Rolling walker  Assistance 2: Minimum assistance  Quality of Gait 2:  (Decreased jose)  Comments/Distance (ft) 2: 40'       Outcome Measures:  [facility] Basic Mobility  Turning from your back to your side while in a flat bed without using bedrails: None  Moving from lying on your back to sitting on the side of a flat bed without using bedrails: A little  Moving to and from bed to chair (including a wheelchair): A little  Standing up from a chair using your arms (e.g. wheelchair or bedside chair): A little  To walk in hospital room: A little  Climbing 3-5 steps with railing: A lot  Basic Mobility - Total Score: 18    Encounter Problems       Encounter Problems (Active)       Balance       STG - Maintains dynamic standing balance with upper extremity support with single cane use, distant supervision       Start:  05/24/24    Expected End:  06/07/24               Mobility       STG - Patient will ambulate at least 75', SBA, LRAD       Start:  05/24/24    Expected End:  06/07/24               PT Transfers       STG - Patient to transfer to and from sit to supine mod I       Start:  05/24/24    Expected End:  06/07/24            STG - Patient will transfer sit to and from stand distant supervision, LRAD       Start:  05/24/24    Expected End:  06/07/24                   Education Documentation  Body Mechanics, taught by Lindsey Noe PT at 5/24/2024  9:47 AM.  Learner: Patient  Readiness: Acceptance  Method: Explanation, Demonstration  Response: Verbalizes Understanding, Needs Reinforcement    Mobility Training, taught by Lindsey Noe, PT at 5/24/2024  9:47 AM.  Learner: Patient  Readiness: Acceptance  Method: Explanation, Demonstration  Response: Verbalizes Understanding, Needs Reinforcement    Education Comments  No comments  found.

## 2024-05-25 LAB
ALBUMIN SERPL BCP-MCNC: 3.9 G/DL (ref 3.4–5)
ANION GAP SERPL CALC-SCNC: 11 MMOL/L (ref 10–20)
ANION GAP SERPL CALC-SCNC: 11 MMOL/L (ref 10–20)
BUN SERPL-MCNC: 19 MG/DL (ref 6–23)
BUN SERPL-MCNC: 20 MG/DL (ref 6–23)
CALCIUM SERPL-MCNC: 9 MG/DL (ref 8.6–10.3)
CALCIUM SERPL-MCNC: 9.1 MG/DL (ref 8.6–10.3)
CHLORIDE SERPL-SCNC: 105 MMOL/L (ref 98–107)
CHLORIDE SERPL-SCNC: 108 MMOL/L (ref 98–107)
CO2 SERPL-SCNC: 25 MMOL/L (ref 21–32)
CO2 SERPL-SCNC: 26 MMOL/L (ref 21–32)
CREAT SERPL-MCNC: 1.45 MG/DL (ref 0.5–1.3)
CREAT SERPL-MCNC: 1.55 MG/DL (ref 0.5–1.3)
EGFRCR SERPLBLD CKD-EPI 2021: 43 ML/MIN/1.73M*2
EGFRCR SERPLBLD CKD-EPI 2021: 47 ML/MIN/1.73M*2
ERYTHROCYTE [DISTWIDTH] IN BLOOD BY AUTOMATED COUNT: 12.3 % (ref 11.5–14.5)
GLUCOSE BLD MANUAL STRIP-MCNC: 149 MG/DL (ref 74–99)
GLUCOSE BLD MANUAL STRIP-MCNC: 156 MG/DL (ref 74–99)
GLUCOSE BLD MANUAL STRIP-MCNC: 187 MG/DL (ref 74–99)
GLUCOSE BLD MANUAL STRIP-MCNC: 230 MG/DL (ref 74–99)
GLUCOSE SERPL-MCNC: 157 MG/DL (ref 74–99)
GLUCOSE SERPL-MCNC: 200 MG/DL (ref 74–99)
HCT VFR BLD AUTO: 38.7 % (ref 41–52)
HGB BLD-MCNC: 13.1 G/DL (ref 13.5–17.5)
MCH RBC QN AUTO: 33.1 PG (ref 26–34)
MCHC RBC AUTO-ENTMCNC: 33.9 G/DL (ref 32–36)
MCV RBC AUTO: 98 FL (ref 80–100)
NRBC BLD-RTO: 0 /100 WBCS (ref 0–0)
PHOSPHATE SERPL-MCNC: 3 MG/DL (ref 2.5–4.9)
PLATELET # BLD AUTO: 161 X10*3/UL (ref 150–450)
POTASSIUM SERPL-SCNC: 4 MMOL/L (ref 3.5–5.3)
POTASSIUM SERPL-SCNC: 4.1 MMOL/L (ref 3.5–5.3)
RBC # BLD AUTO: 3.96 X10*6/UL (ref 4.5–5.9)
SODIUM SERPL-SCNC: 137 MMOL/L (ref 136–145)
SODIUM SERPL-SCNC: 141 MMOL/L (ref 136–145)
WBC # BLD AUTO: 8.9 X10*3/UL (ref 4.4–11.3)

## 2024-05-25 PROCEDURE — 96372 THER/PROPH/DIAG INJ SC/IM: CPT

## 2024-05-25 PROCEDURE — 94760 N-INVAS EAR/PLS OXIMETRY 1: CPT

## 2024-05-25 PROCEDURE — 82947 ASSAY GLUCOSE BLOOD QUANT: CPT

## 2024-05-25 PROCEDURE — 36415 COLL VENOUS BLD VENIPUNCTURE: CPT

## 2024-05-25 PROCEDURE — 2500000001 HC RX 250 WO HCPCS SELF ADMINISTERED DRUGS (ALT 637 FOR MEDICARE OP): Performed by: INTERNAL MEDICINE

## 2024-05-25 PROCEDURE — 2500000002 HC RX 250 W HCPCS SELF ADMINISTERED DRUGS (ALT 637 FOR MEDICARE OP, ALT 636 FOR OP/ED)

## 2024-05-25 PROCEDURE — 85027 COMPLETE CBC AUTOMATED: CPT

## 2024-05-25 PROCEDURE — 99233 SBSQ HOSP IP/OBS HIGH 50: CPT | Performed by: STUDENT IN AN ORGANIZED HEALTH CARE EDUCATION/TRAINING PROGRAM

## 2024-05-25 PROCEDURE — 1200000002 HC GENERAL ROOM WITH TELEMETRY DAILY

## 2024-05-25 PROCEDURE — 80069 RENAL FUNCTION PANEL: CPT

## 2024-05-25 PROCEDURE — 2500000001 HC RX 250 WO HCPCS SELF ADMINISTERED DRUGS (ALT 637 FOR MEDICARE OP)

## 2024-05-25 PROCEDURE — 80048 BASIC METABOLIC PNL TOTAL CA: CPT | Mod: CCI

## 2024-05-25 PROCEDURE — 2500000004 HC RX 250 GENERAL PHARMACY W/ HCPCS (ALT 636 FOR OP/ED)

## 2024-05-25 RX ORDER — TALC
6 POWDER (GRAM) TOPICAL NIGHTLY
Status: DISCONTINUED | OUTPATIENT
Start: 2024-05-25 | End: 2024-05-28 | Stop reason: HOSPADM

## 2024-05-25 RX ADMIN — Medication 1 TABLET: at 08:54

## 2024-05-25 RX ADMIN — ASPIRIN 81 MG: 81 TABLET, CHEWABLE ORAL at 08:53

## 2024-05-25 RX ADMIN — CLOPIDOGREL 75 MG: 75 TABLET ORAL at 08:54

## 2024-05-25 RX ADMIN — Medication 2000 MCG: at 08:54

## 2024-05-25 RX ADMIN — HEPARIN SODIUM 5000 UNITS: 5000 INJECTION INTRAVENOUS; SUBCUTANEOUS at 08:54

## 2024-05-25 RX ADMIN — INSULIN LISPRO 4 UNITS: 100 INJECTION, SOLUTION INTRAVENOUS; SUBCUTANEOUS at 16:57

## 2024-05-25 RX ADMIN — Medication 2000 UNITS: at 08:53

## 2024-05-25 RX ADMIN — GABAPENTIN 200 MG: 100 CAPSULE ORAL at 21:58

## 2024-05-25 RX ADMIN — OXYBUTYNIN CHLORIDE 5 MG: 5 TABLET ORAL at 08:54

## 2024-05-25 RX ADMIN — OXYBUTYNIN CHLORIDE 5 MG: 5 TABLET ORAL at 21:59

## 2024-05-25 RX ADMIN — HEPARIN SODIUM 5000 UNITS: 5000 INJECTION INTRAVENOUS; SUBCUTANEOUS at 23:57

## 2024-05-25 RX ADMIN — HEPARIN SODIUM 5000 UNITS: 5000 INJECTION INTRAVENOUS; SUBCUTANEOUS at 16:15

## 2024-05-25 RX ADMIN — INSULIN LISPRO 2 UNITS: 100 INJECTION, SOLUTION INTRAVENOUS; SUBCUTANEOUS at 12:03

## 2024-05-25 RX ADMIN — GABAPENTIN 200 MG: 100 CAPSULE ORAL at 08:54

## 2024-05-25 RX ADMIN — INSULIN GLARGINE 35 UNITS: 100 INJECTION, SOLUTION SUBCUTANEOUS at 08:54

## 2024-05-25 RX ADMIN — ATORVASTATIN CALCIUM 80 MG: 80 TABLET, FILM COATED ORAL at 21:58

## 2024-05-25 RX ADMIN — PANTOPRAZOLE SODIUM 40 MG: 40 TABLET, DELAYED RELEASE ORAL at 08:54

## 2024-05-25 RX ADMIN — Medication 6 MG: at 21:59

## 2024-05-25 RX ADMIN — ICOSAPENT ETHYL 1000 MG: 1 CAPSULE ORAL at 08:54

## 2024-05-25 ASSESSMENT — PAIN SCALES - GENERAL
PAINLEVEL_OUTOF10: 0 - NO PAIN

## 2024-05-25 ASSESSMENT — COGNITIVE AND FUNCTIONAL STATUS - GENERAL
DAILY ACTIVITIY SCORE: 19
PERSONAL GROOMING: A LITTLE
CLIMB 3 TO 5 STEPS WITH RAILING: A LOT
HELP NEEDED FOR BATHING: A LITTLE
DAILY ACTIVITIY SCORE: 18
TOILETING: A LITTLE
MOBILITY SCORE: 18
TOILETING: A LITTLE
PERSONAL GROOMING: A LITTLE
DRESSING REGULAR LOWER BODY CLOTHING: A LITTLE
DRESSING REGULAR UPPER BODY CLOTHING: A LITTLE
HELP NEEDED FOR BATHING: A LOT
STANDING UP FROM CHAIR USING ARMS: A LITTLE
MOBILITY SCORE: 18
STANDING UP FROM CHAIR USING ARMS: A LITTLE
MOVING TO AND FROM BED TO CHAIR: A LITTLE
DRESSING REGULAR LOWER BODY CLOTHING: A LITTLE
WALKING IN HOSPITAL ROOM: A LOT
CLIMB 3 TO 5 STEPS WITH RAILING: A LOT
DRESSING REGULAR UPPER BODY CLOTHING: A LITTLE
WALKING IN HOSPITAL ROOM: A LOT
MOVING TO AND FROM BED TO CHAIR: A LITTLE

## 2024-05-25 ASSESSMENT — PAIN - FUNCTIONAL ASSESSMENT
PAIN_FUNCTIONAL_ASSESSMENT: 0-10

## 2024-05-25 NOTE — SIGNIFICANT EVENT
Team notified at 2230 on 5/24/24 for fall. Report from nursing staff that patient had gotten up from bed without supervision and sustained an unwitnessed fall.  Nursing staff responded immediately once bedside alarm was triggered.  Upon arrival, patient was found to be getting up from the ground.  Fall was unwitnessed, with unclear mechanism and unclear head strike.  Team responded and assessed the patient.  Patient seen and examined, resting comfortably in bed.  He states that he was getting up from bed because he was bored.  Per nursing staff, the patient often tries to walk on his own but has poor balance and often has to be redirected back into bed. On exam, vitals unchanged from prior.  Patient afebrile at 97.2, pulse 57, respiratory rate 18, blood pressure 192/80, satting 94% on room air.  Patient was at baseline mentation, oriented to self.  Head was atraumatic without any evidence of lesion or hematoma formation.  Physical exam unremarkable for acute neurological deficits.  Strength 5 out of 5 in bilateral upper and lower extremities.  No sensory deficits.  Cranial nerves intact.  The patient did sustain a small laceration to the left distal ring finger.  The lesion is superficial with associated splaying of the nail tip.  Unsure of what caused the laceration or where the finger was struck during the fall.  There is no bony tenderness, no foreign objects, and there is appropriate range of motion at the DIP and PIP.  There was mild bleeding from the distal laceration, which stopped after applying pressure.  No other external injuries noted on exam.  The left ring finger was cleaned in a sterile fashion, and bandages applied as appropriate.  It was discussed with the nurse that if the bleeding continues or resurfaces, Steri-Strips may be applied to help approximate the lesion.  Due to the unwitnessed fall and questionable head strike, a CT head was obtained.  Will follow-up on results, although very low  suspicion for any intracranial pathology at this time. A bedside sitter was ordered to prevent future falls.  Avoiding any restraints or sedatives at this time.

## 2024-05-25 NOTE — CARE PLAN
Problem: Safety - Adult  Goal: Free from fall injury  Outcome: Progressing     Problem: Discharge Planning  Goal: Discharge to home or other facility with appropriate resources  Outcome: Progressing     Problem: Chronic Conditions and Co-morbidities  Goal: Patient's chronic conditions and co-morbidity symptoms are monitored and maintained or improved  Outcome: Progressing     Problem: Fall/Injury  Goal: Not fall by end of shift  Outcome: Progressing  Goal: Be free from injury by end of the shift  Outcome: Progressing  Goal: Verbalize understanding of personal risk factors for fall in the hospital  Outcome: Progressing  Goal: Verbalize understanding of risk factor reduction measures to prevent injury from fall in the home  Outcome: Progressing     Problem: Diabetes  Goal: Achieve decreasing blood glucose levels by end of shift  Outcome: Progressing  Goal: Increase stability of blood glucose readings by end of shift  Outcome: Progressing  Goal: No changes in neurological exam by end of shift  Outcome: Progressing  Goal: Increase self care and/or family involovement by end of shift  Outcome: Progressing   The patient's goals for the shift include  going home    The clinical goals for the shift include neuro status will remain stable and pt will remain safe this shift    Over the shift, the patient did not make progress toward the following goals. Barriers to progression include confusion. Recommendations to address these barriers include redirect and re-acclimate pt to surrounds.

## 2024-05-25 NOTE — CARE PLAN
Patient had unwitnessed fall tonight. CT head (-). Only injury resulting from fall is skin tear to right 4th finger.   Pt had a sitter at bedside for an hour or 2 to help redirect him and keep him safe and in bed, but patient soon required restraints d/t not following directions, trying to call the  to get him out of here, and risk of harming himself and staff.   Patient's wife was informed that he was placed in restraints and she has been at bedside every since, trying to help comfort him and keep him calm. She is agreeable to the restraints for patient's safety.  Neuro status remains stable, other than the increased confusion and agitation.  VSS. Will continue to monitor.      Problem: Safety - Adult  Goal: Free from fall injury  Outcome: Not Progressing     Problem: Discharge Planning  Goal: Discharge to home or other facility with appropriate resources  Outcome: Not Progressing     Problem: Fall/Injury  Goal: Not fall by end of shift  Outcome: Not Progressing  Goal: Be free from injury by end of the shift  Outcome: Not Progressing  Goal: Verbalize understanding of personal risk factors for fall in the hospital  Outcome: Not Progressing  Goal: Verbalize understanding of risk factor reduction measures to prevent injury from fall in the home  Outcome: Not Progressing  Goal: Use assistive devices by end of the shift  Outcome: Not Progressing  Goal: Pace activities to prevent fatigue by end of the shift  Outcome: Not Progressing

## 2024-05-25 NOTE — NURSING NOTE
Staff heard patient's bed alarm going off. They ran to patient's room and heard a thud right before entering the room. Pt is a very high falls risk and has tried getting out of bed on his own multiple times without calling for assistance. This is what happened again, except this time the patient had an unwitnessed fall. Pt states he did not hit head, but he has been confused so it is unclear. No evident injury to head. Pt has new skin tear to his right 4th finger.     Dr Bhatt called to bedside to assess patient. VSS. No noted injuries other than right 4th finger skin tear.     Ct head ordered.    Right 4th finger wound cleansed and dressed.     Will continue to monitor,.

## 2024-05-25 NOTE — PROGRESS NOTES
Kevin Donato is a 84 y.o. male on day 0 of admission presenting with TIA (transient ischemic attack).      Subjective   Patient seen this morning, overnight patient was agitated and delirious.  He sustained a fall, with a mild laceration to his ring finger.  CT head done was unremarkable.  Patient was very agitated, necessitating a sitter.  Restraints (4 points) were administered on account of persistent agitation.  This morning patient is more calm.  He is AOx4.  He denies any headache, cough, chest pain or disorientation.    Objective     Last Recorded Vitals  /78 (BP Location: Right arm, Patient Position: Lying)   Pulse 57   Temp 36.6 °C (97.9 °F) (Temporal)   Resp 17   Wt 93.4 kg (205 lb 14.6 oz)   SpO2 96%   Intake/Output last 3 Shifts:    Intake/Output Summary (Last 24 hours) at 5/25/2024 1150  Last data filed at 5/25/2024 0746  Gross per 24 hour   Intake 360 ml   Output 1150 ml   Net -790 ml       Admission Weight  Weight: 94.4 kg (208 lb 1.8 oz) (05/23/24 1355)    Daily Weight  05/23/24 : 93.4 kg (205 lb 14.6 oz)    Image Results  CT head wo IV contrast  Narrative: Interpreted By:  Blanca Corbin,   STUDY:  CT HEAD WO IV CONTRAST;  5/24/2024 11:34 pm      INDICATION:  Signs/Symptoms:fall.      COMPARISON:  CT head dated 05/23/2024; MRI of the brain and MRA of the head and  neck dated 05/23/2024      ACCESSION NUMBER(S):  [id number]      ORDERING CLINICIAN:  SURESH PHELAN      TECHNIQUE:  Noncontrast axial CT scan of head was performed. Angled reformats in  brain and bone windows were generated. The images were reviewed in  bone, brain, blood and soft tissue windows.      FINDINGS:  No hyperdense intracranial hemorrhage is evident. There is no  evidence of midline shift.      Small focus of diminished attenuation is present in the posterior  right cerebellum, corresponding to area of diffusion restriction  described on previous MRI of the brain dated 05/23/2024, consistent  with an  evolving subacute infarct.      Gray-white differentiation is intact, without evidence of new CT  apparent transcortical infarct.      There is disproportionate enlargement of the supratentorial  ventricular system relative to basal cisterns and sulci, similar in  appearance to prior exam, suspicious for underlying normal pressure  hydrocephalus. No extra-axial collections are identified. Basal  cisterns are patent.      Scalp soft tissues do not demonstrate any acute abnormality.  Calvarium is unremarkable. Mastoid air cells and middle ear cavities  are clear.      There is mucosal thickening present in the left maxillary sinus with  asymmetric bony hyperostosis suggestive of changes of chronic  sinusitis.      Impression: 1. Small focus of diminished attenuation is present in the posterior  right cerebellum, corresponding to area of diffusion restriction  described on previous MRI dated 05/23/2024, distant with a evolving  acute to early subacute infarct. There is slight local mass effect  without evidence of hemorrhagic transformation.  2. No evidence of new hyperdense intracranial hemorrhage or CT  apparent transcortical infarct in the interim since prior head  imaging.  3. Disproportionate enlargement of the supratentorial ventricular  system relative to basal cisterns and sulci suspicious for underlying  normal pressure hydrocephalus.      MACRO:  None      Signed by: Blanca Corbin 5/25/2024 2:59 AM  Dictation workstation:   [id number]      Physical Exam  Constitutional:       Appearance: Normal appearance.   Cardiovascular:      Rate and Rhythm: Normal rate and regular rhythm.      Pulses: Normal pulses.      Heart sounds: Normal heart sounds.   Pulmonary:      Effort: Pulmonary effort is normal.      Breath sounds: Normal breath sounds.   Abdominal:      General: Bowel sounds are normal.      Palpations: Abdomen is soft.   Musculoskeletal:         General: Normal range of motion.   Skin:      General: Skin is warm and dry.   Neurological:      General: No focal deficit present.      Mental Status: He is alert and oriented to person, place, and time.   Psychiatric:         Behavior: Behavior normal.         Relevant Results  CT head wo IV contrast    Result Date: 5/25/2024  Interpreted By:  Blanca Corbin, STUDY: CT HEAD WO IV CONTRAST;  5/24/2024 11:34 pm   INDICATION: Signs/Symptoms:fall.   COMPARISON: CT head dated 05/23/2024; MRI of the brain and MRA of the head and neck dated 05/23/2024   ACCESSION NUMBER(S): [id number]   ORDERING CLINICIAN: SURESH PHELAN   TECHNIQUE: Noncontrast axial CT scan of head was performed. Angled reformats in brain and bone windows were generated. The images were reviewed in bone, brain, blood and soft tissue windows.   FINDINGS: No hyperdense intracranial hemorrhage is evident. There is no evidence of midline shift.   Small focus of diminished attenuation is present in the posterior right cerebellum, corresponding to area of diffusion restriction described on previous MRI of the brain dated 05/23/2024, consistent with an evolving subacute infarct.   Gray-white differentiation is intact, without evidence of new CT apparent transcortical infarct.   There is disproportionate enlargement of the supratentorial ventricular system relative to basal cisterns and sulci, similar in appearance to prior exam, suspicious for underlying normal pressure hydrocephalus. No extra-axial collections are identified. Basal cisterns are patent.   Scalp soft tissues do not demonstrate any acute abnormality. Calvarium is unremarkable. Mastoid air cells and middle ear cavities are clear.   There is mucosal thickening present in the left maxillary sinus with asymmetric bony hyperostosis suggestive of changes of chronic sinusitis.       1. Small focus of diminished attenuation is present in the posterior right cerebellum, corresponding to area of diffusion restriction described on previous  MRI dated 05/23/2024, distant with a evolving acute to early subacute infarct. There is slight local mass effect without evidence of hemorrhagic transformation. 2. No evidence of new hyperdense intracranial hemorrhage or CT apparent transcortical infarct in the interim since prior head imaging. 3. Disproportionate enlargement of the supratentorial ventricular system relative to basal cisterns and sulci suspicious for underlying normal pressure hydrocephalus.   MACRO: None   Signed by: Blanca Corbin 5/25/2024 2:59 AM Dictation workstation:   [id number]    Transthoracic Echo (TTE) Complete    Result Date: 5/24/2024   [facility], [address] [place] [address]               Tel [phone] and Fax [phone] TRANSTHORACIC ECHOCARDIOGRAM REPORT  Patient Name:      IRINA AYANA Gutierrez Physician:    [id number] Ralph Blandon MD Study Date:        5/24/2024            Ordering Provider:    [id number] HAMMAD KUMAR MRN/PID:           [id number]             Fellow: Accession#:        [id number]         Nurse:                Molly Hendricks RN Date of Birth/Age: 1940 / 84 years  Sonographer:          Rosemary Bartholomew RDCS Gender:            M                    Additional Staff: Height:            177.80 cm            Admit Date: Weight:            92.99 kg             Admission Status:     Inpatient -                                                               Routine BSA / BMI:         2.11 m2 / 29.41      Encounter#:           [id number]                    kg/m2                                         Department Location:  [facility] Non                                                               Invasive Blood Pressure: 170 /64  mmHg Study Type:    TRANSTHORACIC ECHO (TTE) COMPLETE Diagnosis/ICD: Transient cerebral ischemic attack, unspecified (NOT on                LCD)-G45.9 Indication:    Transischemic Attack CPT Code:      Echo Complete w Full [id number] Patient History: Diabetes:          Yes Pertinent History: HTN, Hyperlipidemia and CKD, right side weakness. Study Detail: The following Echo studies were performed: 2D, M-Mode, color flow               and Doppler. Agitated saline used as a contrast agent for               intraseptal flow evaluation. The patient was awake.  PHYSICIAN INTERPRETATION: Left Ventricle: The left ventricular systolic function is normal, with an estimated ejection fraction of 55%. There are no regional wall motion abnormalities. The left ventricular cavity size is upper limits of normal. There is concentric left ventricular hypertrophy. Spectral Doppler shows an impaired relaxation pattern of left ventricular diastolic filling. Left Atrium: The left atrium is normal in size. A bubble study using agitated saline was performed. Bubble study is negative. Right Ventricle: The right ventricle is normal in size. There is normal right ventricular global systolic function. Right Atrium: The right atrium is normal in size. Aortic Valve: The aortic valve is trileaflet. There is minimal aortic valve cusp calcification. There is no evidence of aortic valve stenosis. There is mild aortic valve regurgitation. The peak instantaneous gradient of the aortic valve is 8.7 mmHg. The mean gradient of the aortic valve is 4.9 mmHg. Mitral Valve: The mitral valve is mildly thickened. There is trace to mild mitral valve regurgitation. Tricuspid Valve: The tricuspid valve is structurally normal. There is trace tricuspid regurgitation. The right ventricular systolic pressure is unable to be estimated. Pulmonic Valve: The pulmonic valve is structurally normal. There is trace to mild pulmonic valve regurgitation. Pericardium: There  is no pericardial effusion noted. Aorta: The aortic root is abnormal. There is mild dilatation of the ascending aorta. The aortic root is at the upper limits of normal size. Systemic Veins: The inferior vena cava appears to be of normal size. There is IVC inspiratory collapse greater than 50%. In comparison to the previous echocardiogram(s): There are no prior studies on this patient for comparison purposes.  CONCLUSIONS:  1. Left ventricular systolic function is normal with a 55% estimated ejection fraction.  2. Spectral Doppler shows an impaired relaxation pattern of left ventricular diastolic filling.  3. Mild aortic valve regurgitation.  4. There is mild dilatation of the ascending aorta. QUANTITATIVE DATA SUMMARY: 2D MEASUREMENTS:                           Normal Ranges: IVSd:          1.31 cm    (0.6-1.1cm) LVPWd:         1.30 cm    (0.6-1.1cm) LVIDd:         5.15 cm    (3.9-5.9cm) LVIDs:         3.54 cm LV Mass Index: 130.8 g/m2 LV % FS        31.3 % LA VOLUME:                               Normal Ranges: LA Vol A4C:        36.9 ml    (22+/-6mL/m2) LA Vol A2C:        43.9 ml LA Vol BP:         43.0 ml LA Vol Index A4C:  17.5ml/m2 LA Vol Index A2C:  20.8 ml/m2 LA Vol Index BP:   20.4 ml/m2 LA Area A4C:       15.6 cm2 LA Area A2C:       15.9 cm2 LA Major Axis A4C: 5.6 cm LA Major Axis A2C: 4.9 cm LA Volume Index:   20.5 ml/m2 LA Vol A4C:        33.7 ml LA Vol A2C:        40.8 ml RA VOLUME BY A/L METHOD:                       Normal Ranges: RA Area A4C: 15.0 cm2 AORTA MEASUREMENTS:                      Normal Ranges: Ao Sinus, d: 3.70 cm (2.1-3.5cm) Asc Ao, d:   4.10 cm (2.1-3.4cm) LV SYSTOLIC FUNCTION BY 2D PLANIMETRY (MOD):                     Normal Ranges: EF-A4C View: 57.4 % (>=55%) EF-A2C View: 55.6 % EF-Biplane:  54.6 % LV DIASTOLIC FUNCTION:                               Normal Ranges: MV Peak E:        0.70 m/s    (0.7-1.2 m/s) MV Peak A:        1.06 m/s    (0.42-0.7 m/s) E/A Ratio:        0.66         (1.0-2.2) MV e'             0.05 m/s    (>8.0) MV lateral e'     0.05 m/s MV medial e'      0.07 m/s MV A Dur:         124.57 msec E/e' Ratio:       13.93       (<8.0) PulmV Sys Bernard:    66.89 cm/s PulmV Hoff Bernard:   32.01 cm/s PulmV S/D Bernard:    2.09 PulmV A Revs Bernard: 26.67 cm/s PulmV A Revs Dur: 128.03 msec MITRAL VALVE:                 Normal Ranges: MV DT: 316 msec (150-240msec) AORTIC VALVE:                                   Normal Ranges: AoV Vmax:                1.48 m/s (<=1.7m/s) AoV Peak P.7 mmHg (<20mmHg) AoV Mean P.9 mmHg (1.7-11.5mmHg) LVOT Max Bernard:            1.08 m/s (<=1.1m/s) AoV VTI:                 38.43 cm (18-25cm) LVOT VTI:                27.53 cm LVOT Diameter:           2.04 cm  (1.8-2.4cm) AoV Area, VTI:           2.26 cm2 (2.5-5.5cm2) AoV Area,Vmax:           2.30 cm2 (2.5-4.5cm2) AoV Dimensionless Index: 0.72 AORTIC INSUFFICIENCY: AI Vmax:       4.55 m/s AI Half-time:  853 msec AI Decel Time: 2942 msec AI Decel Rate: 156.37 cm/s2  RIGHT VENTRICLE: RV Basal 3.00 cm RV Mid   2.10 cm RV Major 7.7 cm TAPSE:   26.0 mm RV s'    0.13 m/s TRICUSPID VALVE/RVSP:                             Normal Ranges: Peak TR Velocity: 2.63 m/s RV Syst Pressure: 30.7 mmHg (< 30mmHg) IVC Diam:         1.70 cm PULMONIC VALVE:                      Normal Ranges: PV Max Bernard: 0.9 m/s  (0.6-0.9m/s) PV Max PG:  3.5 mmHg Pulmonary Veins: PulmV A Revs Dur: 128.03 msec PulmV A Revs Bernard: 26.67 cm/s PulmV Hoff Bernard:   32.01 cm/s PulmV S/D Bernard:    2.09 PulmV Sys Bernard:    66.89 cm/s AORTA: Asc Ao Diam 4.18 cm  [id number] Ralph Blandon MD Electronically signed on 2024 at 12:55:37 PM  ** Final **     MR brain wo IV contrast    Result Date: 2024  Interpreted By:  Jose A Alicea, STUDY: MR BRAIN WO IV CONTRAST; MR ANGIO NECK WO IV CONTRAST; MR ANGIO HEAD WO IV CONTRAST;  2024 8:31 pm   INDICATION: Signs/Symptoms:Right sided weakness. Stroke protocol.   COMPARISON: CT head w/o contrast dated  5/23/2024, MRI brain 03/15/2018   ACCESSION NUMBER(S): [id number]; [id number]; [id number]   ORDERING CLINICIAN: JULIAN BONE   TECHNIQUE: Multiplanar, multi-sequence images of the brain were obtained without contrast.   3D time-of-flight MR angiography of the head and neck was performed. The images were reviewed as source images and maximum intensity projections.   FINDINGS: MRI BRAIN:   There is a relatively small area of an subacute ischemic infarct in the medial aspect of the right cerebellar hemisphere within the PICA territory (mildly hyperintense on DWI and intermediate on ADC map). Otherwise, there is no acute ischemic infarct. There are additional areas of gliosis within the right PICA territory representing remote right PICA infarct, which is new from MRI brain 03/15/2018. There is a small area of hemosiderin within the infreromedial-most aspect of this chronic infarct territory.   Mild periventricular and subcortical hemispheric T2/FLAIR white matter hyperintensities are most compatible with chronic small vessel ischemic disease.   There is a partial loss of the expected normal flow void within the visualized portions of the extracranial and intracranial right vertebral artery. This is new from 03/15/2018 MRI brain   There is no acute intraparenchymal hemorrhage, mass, mass-effect, or an extra-axial fluid collection.   Moderate ventriculomegaly, borderline disproportionate within Szymanski ratio of 40.1. No intraventricular hemorrhage.   Normal morphology of midline structures. The craniovertebral junction is normal.   The orbits and globes are unremarkable.   The paranasal sinuses show no air-fluid levels or hemorrhage. Mild left maxillary sinus mucosal thickening and anterior bilateral ethmoid mucosal thickening.   The mastoid air cells are clear.     MRA HEAD: There is complete loss of flow related signal in the proximal intracranial right vertebral artery with mildly decreased flow in the mid to  distal intracranial right vertebral artery. There is no hemodynamically significant intracranial stenosis or large vessel occlusion. There is no intracranial aneurysm. Incidental note of a fenestrated anterior communicating artery. There is a fetal type right posterior cerebral artery.     MRA NECK: Source images are motion degraded. The origins of the carotid and vertebral arteries are not included. Mild spin-dephasing artifact is noted at the carotid bulbs.   COMMON/INTERNAL CAROTID ARTERIES: There is mild atherosclerosis of the right carotid bulb. No occlusion, hemodynamically significant stenosis, or dissection.   VERTEBRAL ARTERIES: There is complete loss of flow related signal in the extracranial right vertebral artery. On the left, no occlusion, hemodynamically significant stenosis, or dissection. There is mild narrowing of the mid V2 segment which is due to undulation by adjacent hypertrophic osteophyte from the uncovertebral joint.         1. 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 2. Aforementioned is superimposed upon a larger chronic right PICA territory infarct which is new from MRI brain 03/15/2018. 3. Mild periventricular and subcortical hemispheric T2/FLAIR white matter hyperintensities are most compatible with chronic small vessel ischemic disease. 4. Borderline disproportionate ventriculomegaly which can be seen with normal pressure hydrocephalus.   MACRO: None.   Signed by: Jose A Alicea 5/23/2024 8:59 PM Dictation workstation:   [id number]    MR angio head wo IV contrast    Result Date: 5/23/2024  Interpreted By:  Jose A Alicea, STUDY: MR BRAIN WO IV CONTRAST; MR ANGIO NECK WO IV CONTRAST; MR ANGIO HEAD WO IV CONTRAST;  5/23/2024 8:31 pm   INDICATION: Signs/Symptoms:Right sided weakness. Stroke protocol.   COMPARISON: CT head w/o contrast dated 5/23/2024, MRI brain  03/15/2018   ACCESSION NUMBER(S): [id number]; [id number]; [id number]   ORDERING CLINICIAN: JULIAN BONE   TECHNIQUE: Multiplanar, multi-sequence images of the brain were obtained without contrast.   3D time-of-flight MR angiography of the head and neck was performed. The images were reviewed as source images and maximum intensity projections.   FINDINGS: MRI BRAIN:   There is a relatively small area of an subacute ischemic infarct in the medial aspect of the right cerebellar hemisphere within the PICA territory (mildly hyperintense on DWI and intermediate on ADC map). Otherwise, there is no acute ischemic infarct. There are additional areas of gliosis within the right PICA territory representing remote right PICA infarct, which is new from MRI brain 03/15/2018. There is a small area of hemosiderin within the infreromedial-most aspect of this chronic infarct territory.   Mild periventricular and subcortical hemispheric T2/FLAIR white matter hyperintensities are most compatible with chronic small vessel ischemic disease.   There is a partial loss of the expected normal flow void within the visualized portions of the extracranial and intracranial right vertebral artery. This is new from 03/15/2018 MRI brain   There is no acute intraparenchymal hemorrhage, mass, mass-effect, or an extra-axial fluid collection.   Moderate ventriculomegaly, borderline disproportionate within Szymanski ratio of 40.1. No intraventricular hemorrhage.   Normal morphology of midline structures. The craniovertebral junction is normal.   The orbits and globes are unremarkable.   The paranasal sinuses show no air-fluid levels or hemorrhage. Mild left maxillary sinus mucosal thickening and anterior bilateral ethmoid mucosal thickening.   The mastoid air cells are clear.     MRA HEAD: There is complete loss of flow related signal in the proximal intracranial right vertebral artery with mildly decreased flow in the mid to distal intracranial  right vertebral artery. There is no hemodynamically significant intracranial stenosis or large vessel occlusion. There is no intracranial aneurysm. Incidental note of a fenestrated anterior communicating artery. There is a fetal type right posterior cerebral artery.     MRA NECK: Source images are motion degraded. The origins of the carotid and vertebral arteries are not included. Mild spin-dephasing artifact is noted at the carotid bulbs.   COMMON/INTERNAL CAROTID ARTERIES: There is mild atherosclerosis of the right carotid bulb. No occlusion, hemodynamically significant stenosis, or dissection.   VERTEBRAL ARTERIES: There is complete loss of flow related signal in the extracranial right vertebral artery. On the left, no occlusion, hemodynamically significant stenosis, or dissection. There is mild narrowing of the mid V2 segment which is due to undulation by adjacent hypertrophic osteophyte from the uncovertebral joint.         1. 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 2. Aforementioned is superimposed upon a larger chronic right PICA territory infarct which is new from MRI brain 03/15/2018. 3. Mild periventricular and subcortical hemispheric T2/FLAIR white matter hyperintensities are most compatible with chronic small vessel ischemic disease. 4. Borderline disproportionate ventriculomegaly which can be seen with normal pressure hydrocephalus.   MACRO: None.   Signed by: Jose A Alicea 5/23/2024 8:59 PM Dictation workstation:   [id number]    MR angio neck wo IV contrast    Result Date: 5/23/2024  Interpreted By:  Jose A Alicea, STUDY: MR BRAIN WO IV CONTRAST; MR ANGIO NECK WO IV CONTRAST; MR ANGIO HEAD WO IV CONTRAST;  5/23/2024 8:31 pm   INDICATION: Signs/Symptoms:Right sided weakness. Stroke protocol.   COMPARISON: CT head w/o contrast dated 5/23/2024, MRI brain 03/15/2018    ACCESSION NUMBER(S): [id number]; [id number]; [id number]   ORDERING CLINICIAN: JULIAN BONE   TECHNIQUE: Multiplanar, multi-sequence images of the brain were obtained without contrast.   3D time-of-flight MR angiography of the head and neck was performed. The images were reviewed as source images and maximum intensity projections.   FINDINGS: MRI BRAIN:   There is a relatively small area of an subacute ischemic infarct in the medial aspect of the right cerebellar hemisphere within the PICA territory (mildly hyperintense on DWI and intermediate on ADC map). Otherwise, there is no acute ischemic infarct. There are additional areas of gliosis within the right PICA territory representing remote right PICA infarct, which is new from MRI brain 03/15/2018. There is a small area of hemosiderin within the infreromedial-most aspect of this chronic infarct territory.   Mild periventricular and subcortical hemispheric T2/FLAIR white matter hyperintensities are most compatible with chronic small vessel ischemic disease.   There is a partial loss of the expected normal flow void within the visualized portions of the extracranial and intracranial right vertebral artery. This is new from 03/15/2018 MRI brain   There is no acute intraparenchymal hemorrhage, mass, mass-effect, or an extra-axial fluid collection.   Moderate ventriculomegaly, borderline disproportionate within Szymanski ratio of 40.1. No intraventricular hemorrhage.   Normal morphology of midline structures. The craniovertebral junction is normal.   The orbits and globes are unremarkable.   The paranasal sinuses show no air-fluid levels or hemorrhage. Mild left maxillary sinus mucosal thickening and anterior bilateral ethmoid mucosal thickening.   The mastoid air cells are clear.     MRA HEAD: There is complete loss of flow related signal in the proximal intracranial right vertebral artery with mildly decreased flow in the mid to distal intracranial right vertebral  artery. There is no hemodynamically significant intracranial stenosis or large vessel occlusion. There is no intracranial aneurysm. Incidental note of a fenestrated anterior communicating artery. There is a fetal type right posterior cerebral artery.     MRA NECK: Source images are motion degraded. The origins of the carotid and vertebral arteries are not included. Mild spin-dephasing artifact is noted at the carotid bulbs.   COMMON/INTERNAL CAROTID ARTERIES: There is mild atherosclerosis of the right carotid bulb. No occlusion, hemodynamically significant stenosis, or dissection.   VERTEBRAL ARTERIES: There is complete loss of flow related signal in the extracranial right vertebral artery. On the left, no occlusion, hemodynamically significant stenosis, or dissection. There is mild narrowing of the mid V2 segment which is due to undulation by adjacent hypertrophic osteophyte from the uncovertebral joint.         1. 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 2. Aforementioned is superimposed upon a larger chronic right PICA territory infarct which is new from MRI brain 03/15/2018. 3. Mild periventricular and subcortical hemispheric T2/FLAIR white matter hyperintensities are most compatible with chronic small vessel ischemic disease. 4. Borderline disproportionate ventriculomegaly which can be seen with normal pressure hydrocephalus.   MACRO: None.   Signed by: Jose A Alicea 5/23/2024 8:59 PM Dictation workstation:   [id number]    CT brain attack head wo IV contrast    Result Date: 5/23/2024  Interpreted By:  Sarah Pimentel, STUDY: CT BRAIN ATTACK HEAD WO IV CONTRAST;  5/23/2024 1:58 pm   INDICATION: Signs/Symptoms:Stroke Evaluation.   COMPARISON: Brain MRI 03/15/2018   ACCESSION NUMBER(S): [id number]   ORDERING CLINICIAN: JULIAN BONE   TECHNIQUE: Unenhanced CT images of the head were  obtained.   FINDINGS: Diffuse cerebral atrophy with prominence of the extra-axial spaces and cerebral sulci. Moderate ventriculomegaly somewhat out of proportion to the degree of cerebral atrophy. Periventricular white matter hypodensities consistent with small vessel ischemic disease. These findings are similar to the previous exam. There is no acute intracranial hemorrhage, mass effect or midline shift. No extraaxial fluid collection.   No focal calvarial lesion.   Left maxillary sinus mucosal thickening with thickened, sclerotic walls. Remaining visualized paranasal sinuses are clear.       No acute intracranial hemorrhage or mass-effect.   Moderate ventriculomegaly which could reflect communicating hydrocephalus similar to 03/15/2018 MRI.   Chronic inflammatory changes of the left maxillary sinus.   MACRO: Sarah Pimentel discussed the significance and urgency of this critical finding by telephone with  Steph Burdick on 5/23/2024 at 2:04 pm. (**-RCF-**) Findings:  See findings.     Signed by: Sarah Pimentel 5/23/2024 2:08 PM Dictation workstation:   [id number]      Results for orders placed or performed during the hospital encounter of 05/23/24 (from the past 24 hour(s))   POCT GLUCOSE   Result Value Ref Range    POCT Glucose 213 (H) 74 - 99 mg/dL   POCT GLUCOSE   Result Value Ref Range    POCT Glucose 223 (H) 74 - 99 mg/dL   CBC   Result Value Ref Range    WBC 8.9 4.4 - 11.3 x10*3/uL    nRBC 0.0 0.0 - 0.0 /100 WBCs    RBC 3.96 (L) 4.50 - 5.90 x10*6/uL    Hemoglobin 13.1 (L) 13.5 - 17.5 g/dL    Hematocrit 38.7 (L) 41.0 - 52.0 %    MCV 98 80 - 100 fL    MCH 33.1 26.0 - 34.0 pg    MCHC 33.9 32.0 - 36.0 g/dL    RDW 12.3 11.5 - 14.5 %    Platelets 161 150 - 450 x10*3/uL   Basic metabolic panel   Result Value Ref Range    Glucose 157 (H) 74 - 99 mg/dL    Sodium 141 136 - 145 mmol/L    Potassium 4.0 3.5 - 5.3 mmol/L    Chloride 108 (H) 98 - 107 mmol/L    Bicarbonate 26 21 - 32 mmol/L    Anion Gap 11 10 - 20 mmol/L     Urea Nitrogen 19 6 - 23 mg/dL    Creatinine 1.45 (H) 0.50 - 1.30 mg/dL    eGFR 47 (L) >60 mL/min/1.73m*2    Calcium 9.0 8.6 - 10.3 mg/dL   POCT GLUCOSE   Result Value Ref Range    POCT Glucose 149 (H) 74 - 99 mg/dL   POCT GLUCOSE   Result Value Ref Range    POCT Glucose 187 (H) 74 - 99 mg/dL          Assessment/Plan    Principal Problem:    TIA (transient ischemic attack)    Kevin Donato is a 84 y.o. male with PMH of insulin-dependent T2DM, CKD 3, BPH, HTN, HLD, admitted for TIA and stroke workup.      Acute Medical Issues   #TIA  #s/p Fall   #Right UE and LE weakness - resolved  -LDL 78, triglycerides 143  - Hemoglobin A1c pending  - TTE: EF 55%, mild aortic valve regurg, mild dilatation of ascending aorta  - PT/OT moderate intensity recs  - s/p Aspirin 325 mg p.o. x 1  - Continue DAPT  - Atorvastatin 80 mg p.o. nightly  - Telemetry and pulse ox monitoring  - repeat CTH done overnight was normal      #HTN  - Permissive hypertension up to  mmHg for initial 24 hours  - Restart losartan 50 mg p.o. daily     Chronic Medical Issues   #Insulin-dependent T2DM  - home regimen: Lantus 35 units every morning  -Lispro sliding scale 0-10 units 3 times daily with meals     #Peripheral neuropathy  - Gabapentin 200 mg p.o. twice daily     #BPH  - Oxybutynin 5 mg p.o. twice daily  - Holding tamsulosin 0.4 p.o. nightly     #vitamin supplementation  -Vitamin D3 2000 units p.o. daily  - Vitamin B12 2000 mcg daily  - Omega-3 daily  - Multivitamin daily     IVF: PRN   Electrolytes: Replete as needed   Diet: Carb controlled  GI ppx: Protonix  DVT ppx: Heparin sub Q  Antibiotics: none  Code: Full Code      Disposition: 84 y.o.male admitted for TIA and stroke workup.  neurology consulted, DAPT,  PT/OT on board.   Yamilet Reina MD

## 2024-05-26 LAB
GLUCOSE BLD MANUAL STRIP-MCNC: 142 MG/DL (ref 74–99)
GLUCOSE BLD MANUAL STRIP-MCNC: 174 MG/DL (ref 74–99)
GLUCOSE BLD MANUAL STRIP-MCNC: 187 MG/DL (ref 74–99)
GLUCOSE BLD MANUAL STRIP-MCNC: 212 MG/DL (ref 74–99)

## 2024-05-26 PROCEDURE — 2500000001 HC RX 250 WO HCPCS SELF ADMINISTERED DRUGS (ALT 637 FOR MEDICARE OP): Performed by: INTERNAL MEDICINE

## 2024-05-26 PROCEDURE — 2500000001 HC RX 250 WO HCPCS SELF ADMINISTERED DRUGS (ALT 637 FOR MEDICARE OP)

## 2024-05-26 PROCEDURE — 82947 ASSAY GLUCOSE BLOOD QUANT: CPT | Mod: 91

## 2024-05-26 PROCEDURE — 97535 SELF CARE MNGMENT TRAINING: CPT | Mod: CO,GO | Performed by: OCCUPATIONAL THERAPY ASSISTANT

## 2024-05-26 PROCEDURE — 2500000002 HC RX 250 W HCPCS SELF ADMINISTERED DRUGS (ALT 637 FOR MEDICARE OP, ALT 636 FOR OP/ED)

## 2024-05-26 PROCEDURE — 2500000004 HC RX 250 GENERAL PHARMACY W/ HCPCS (ALT 636 FOR OP/ED)

## 2024-05-26 PROCEDURE — 97530 THERAPEUTIC ACTIVITIES: CPT | Mod: CO,GO | Performed by: OCCUPATIONAL THERAPY ASSISTANT

## 2024-05-26 PROCEDURE — 99232 SBSQ HOSP IP/OBS MODERATE 35: CPT | Performed by: STUDENT IN AN ORGANIZED HEALTH CARE EDUCATION/TRAINING PROGRAM

## 2024-05-26 PROCEDURE — 1200000002 HC GENERAL ROOM WITH TELEMETRY DAILY

## 2024-05-26 PROCEDURE — 2500000002 HC RX 250 W HCPCS SELF ADMINISTERED DRUGS (ALT 637 FOR MEDICARE OP, ALT 636 FOR OP/ED): Mod: MUE | Performed by: INTERNAL MEDICINE

## 2024-05-26 RX ORDER — POLYETHYLENE GLYCOL 3350 17 G/17G
17 POWDER, FOR SOLUTION ORAL DAILY
Status: DISCONTINUED | OUTPATIENT
Start: 2024-05-26 | End: 2024-05-27

## 2024-05-26 RX ORDER — INSULIN LISPRO 100 [IU]/ML
4 INJECTION, SOLUTION INTRAVENOUS; SUBCUTANEOUS
Status: DISCONTINUED | OUTPATIENT
Start: 2024-05-26 | End: 2024-05-28 | Stop reason: HOSPADM

## 2024-05-26 RX ADMIN — CLOPIDOGREL 75 MG: 75 TABLET ORAL at 08:33

## 2024-05-26 RX ADMIN — GABAPENTIN 200 MG: 100 CAPSULE ORAL at 20:09

## 2024-05-26 RX ADMIN — ATORVASTATIN CALCIUM 80 MG: 80 TABLET, FILM COATED ORAL at 20:09

## 2024-05-26 RX ADMIN — INSULIN GLARGINE 35 UNITS: 100 INJECTION, SOLUTION SUBCUTANEOUS at 08:33

## 2024-05-26 RX ADMIN — INSULIN LISPRO 4 UNITS: 100 INJECTION, SOLUTION INTRAVENOUS; SUBCUTANEOUS at 11:54

## 2024-05-26 RX ADMIN — HEPARIN SODIUM 5000 UNITS: 5000 INJECTION INTRAVENOUS; SUBCUTANEOUS at 16:33

## 2024-05-26 RX ADMIN — POLYETHYLENE GLYCOL 3350 17 G: 17 POWDER, FOR SOLUTION ORAL at 11:54

## 2024-05-26 RX ADMIN — TAMSULOSIN HYDROCHLORIDE 0.4 MG: 0.4 CAPSULE ORAL at 20:09

## 2024-05-26 RX ADMIN — INSULIN LISPRO 2 UNITS: 100 INJECTION, SOLUTION INTRAVENOUS; SUBCUTANEOUS at 08:34

## 2024-05-26 RX ADMIN — PANTOPRAZOLE SODIUM 40 MG: 40 TABLET, DELAYED RELEASE ORAL at 06:03

## 2024-05-26 RX ADMIN — ICOSAPENT ETHYL 1000 MG: 1 CAPSULE ORAL at 08:33

## 2024-05-26 RX ADMIN — GABAPENTIN 200 MG: 100 CAPSULE ORAL at 08:33

## 2024-05-26 RX ADMIN — OXYBUTYNIN CHLORIDE 5 MG: 5 TABLET ORAL at 08:33

## 2024-05-26 RX ADMIN — Medication 2000 MCG: at 08:33

## 2024-05-26 RX ADMIN — HEPARIN SODIUM 5000 UNITS: 5000 INJECTION INTRAVENOUS; SUBCUTANEOUS at 08:33

## 2024-05-26 RX ADMIN — Medication 1 TABLET: at 08:33

## 2024-05-26 RX ADMIN — ASPIRIN 81 MG: 81 TABLET, CHEWABLE ORAL at 08:33

## 2024-05-26 RX ADMIN — Medication 2000 UNITS: at 08:33

## 2024-05-26 RX ADMIN — INSULIN LISPRO 4 UNITS: 100 INJECTION, SOLUTION INTRAVENOUS; SUBCUTANEOUS at 16:33

## 2024-05-26 RX ADMIN — OXYBUTYNIN CHLORIDE 5 MG: 5 TABLET ORAL at 20:09

## 2024-05-26 RX ADMIN — HEPARIN SODIUM 5000 UNITS: 5000 INJECTION INTRAVENOUS; SUBCUTANEOUS at 23:30

## 2024-05-26 RX ADMIN — Medication 6 MG: at 20:09

## 2024-05-26 RX ADMIN — LOSARTAN POTASSIUM 50 MG: 50 TABLET, FILM COATED ORAL at 08:33

## 2024-05-26 ASSESSMENT — COGNITIVE AND FUNCTIONAL STATUS - GENERAL
DAILY ACTIVITIY SCORE: 19
DAILY ACTIVITIY SCORE: 15
CLIMB 3 TO 5 STEPS WITH RAILING: A LOT
TOILETING: A LOT
PERSONAL GROOMING: A LITTLE
MOVING TO AND FROM BED TO CHAIR: A LITTLE
HELP NEEDED FOR BATHING: A LOT
PERSONAL GROOMING: A LITTLE
DRESSING REGULAR UPPER BODY CLOTHING: A LITTLE
EATING MEALS: A LITTLE
DRESSING REGULAR UPPER BODY CLOTHING: A LITTLE
HELP NEEDED FOR BATHING: A LITTLE
TOILETING: A LOT
MOBILITY SCORE: 18
EATING MEALS: A LITTLE
DRESSING REGULAR LOWER BODY CLOTHING: A LITTLE
STANDING UP FROM CHAIR USING ARMS: A LITTLE
PERSONAL GROOMING: A LITTLE
DRESSING REGULAR UPPER BODY CLOTHING: A LITTLE
CLIMB 3 TO 5 STEPS WITH RAILING: A LOT
DAILY ACTIVITIY SCORE: 15
MOVING TO AND FROM BED TO CHAIR: A LITTLE
DRESSING REGULAR LOWER BODY CLOTHING: A LOT
HELP NEEDED FOR BATHING: A LOT
WALKING IN HOSPITAL ROOM: A LOT
WALKING IN HOSPITAL ROOM: A LOT
DRESSING REGULAR LOWER BODY CLOTHING: A LOT
TOILETING: A LITTLE
STANDING UP FROM CHAIR USING ARMS: A LITTLE
MOBILITY SCORE: 18

## 2024-05-26 ASSESSMENT — PAIN - FUNCTIONAL ASSESSMENT
PAIN_FUNCTIONAL_ASSESSMENT: 0-10
PAIN_FUNCTIONAL_ASSESSMENT: 0-10

## 2024-05-26 ASSESSMENT — PAIN SCALES - GENERAL
PAINLEVEL_OUTOF10: 0 - NO PAIN

## 2024-05-26 NOTE — PROGRESS NOTES
Kevin Donato is a 84 y.o. male on day 1 of admission presenting with TIA (transient ischemic attack).      Subjective   Patient seen this morning, wife at bedside.  Denies any pain, endorses constipation.  Denies chest pain, fever, chills, shortness of breath, abdominal pain.    Objective     Last Recorded Vitals  /53 (BP Location: Right arm, Patient Position: Lying)   Pulse 77   Temp 36.7 °C (98.1 °F) (Temporal)   Resp 18   Wt 93.4 kg (205 lb 14.6 oz)   SpO2 93%   Intake/Output last 3 Shifts:    Intake/Output Summary (Last 24 hours) at 5/26/2024 1303  Last data filed at 5/26/2024 0401  Gross per 24 hour   Intake 560 ml   Output 1600 ml   Net -1040 ml       Admission Weight  Weight: 94.4 kg (208 lb 1.8 oz) (05/23/24 1355)    Daily Weight  05/23/24 : 93.4 kg (205 lb 14.6 oz)    Image Results  CT head wo IV contrast  Narrative: Interpreted By:  Blanca Corbin,   STUDY:  CT HEAD WO IV CONTRAST;  5/24/2024 11:34 pm      INDICATION:  Signs/Symptoms:fall.      COMPARISON:  CT head dated 05/23/2024; MRI of the brain and MRA of the head and  neck dated 05/23/2024      ACCESSION NUMBER(S):  [id number]      ORDERING CLINICIAN:  SURESH PHELAN      TECHNIQUE:  Noncontrast axial CT scan of head was performed. Angled reformats in  brain and bone windows were generated. The images were reviewed in  bone, brain, blood and soft tissue windows.      FINDINGS:  No hyperdense intracranial hemorrhage is evident. There is no  evidence of midline shift.      Small focus of diminished attenuation is present in the posterior  right cerebellum, corresponding to area of diffusion restriction  described on previous MRI of the brain dated 05/23/2024, consistent  with an evolving subacute infarct.      Gray-white differentiation is intact, without evidence of new CT  apparent transcortical infarct.      There is disproportionate enlargement of the supratentorial  ventricular system relative to basal cisterns and sulci,  similar in  appearance to prior exam, suspicious for underlying normal pressure  hydrocephalus. No extra-axial collections are identified. Basal  cisterns are patent.      Scalp soft tissues do not demonstrate any acute abnormality.  Calvarium is unremarkable. Mastoid air cells and middle ear cavities  are clear.      There is mucosal thickening present in the left maxillary sinus with  asymmetric bony hyperostosis suggestive of changes of chronic  sinusitis.      Impression: 1. Small focus of diminished attenuation is present in the posterior  right cerebellum, corresponding to area of diffusion restriction  described on previous MRI dated 05/23/2024, distant with a evolving  acute to early subacute infarct. There is slight local mass effect  without evidence of hemorrhagic transformation.  2. No evidence of new hyperdense intracranial hemorrhage or CT  apparent transcortical infarct in the interim since prior head  imaging.  3. Disproportionate enlargement of the supratentorial ventricular  system relative to basal cisterns and sulci suspicious for underlying  normal pressure hydrocephalus.      MACRO:  None      Signed by: Blanca Corbin 5/25/2024 2:59 AM  Dictation workstation:   [id number]      Physical Exam  Constitutional:       Appearance: Normal appearance.   Cardiovascular:      Rate and Rhythm: Normal rate and regular rhythm.      Pulses: Normal pulses.      Heart sounds: Normal heart sounds.   Pulmonary:      Effort: Pulmonary effort is normal.      Breath sounds: Normal breath sounds.   Abdominal:      General: Bowel sounds are normal.      Palpations: Abdomen is soft.   Musculoskeletal:         General: Normal range of motion.   Skin:     General: Skin is warm and dry.   Neurological:      General: No focal deficit present.      Mental Status: He is alert and oriented to person, place, and time.   Psychiatric:         Behavior: Behavior normal.         Relevant Results  CT head wo IV  contrast    Result Date: 5/25/2024  Interpreted By:  Blanca Corbin, STUDY: CT HEAD WO IV CONTRAST;  5/24/2024 11:34 pm   INDICATION: Signs/Symptoms:fall.   COMPARISON: CT head dated 05/23/2024; MRI of the brain and MRA of the head and neck dated 05/23/2024   ACCESSION NUMBER(S): [id number]   ORDERING CLINICIAN: SURESH PHELAN   TECHNIQUE: Noncontrast axial CT scan of head was performed. Angled reformats in brain and bone windows were generated. The images were reviewed in bone, brain, blood and soft tissue windows.   FINDINGS: No hyperdense intracranial hemorrhage is evident. There is no evidence of midline shift.   Small focus of diminished attenuation is present in the posterior right cerebellum, corresponding to area of diffusion restriction described on previous MRI of the brain dated 05/23/2024, consistent with an evolving subacute infarct.   Gray-white differentiation is intact, without evidence of new CT apparent transcortical infarct.   There is disproportionate enlargement of the supratentorial ventricular system relative to basal cisterns and sulci, similar in appearance to prior exam, suspicious for underlying normal pressure hydrocephalus. No extra-axial collections are identified. Basal cisterns are patent.   Scalp soft tissues do not demonstrate any acute abnormality. Calvarium is unremarkable. Mastoid air cells and middle ear cavities are clear.   There is mucosal thickening present in the left maxillary sinus with asymmetric bony hyperostosis suggestive of changes of chronic sinusitis.       1. Small focus of diminished attenuation is present in the posterior right cerebellum, corresponding to area of diffusion restriction described on previous MRI dated 05/23/2024, distant with a evolving acute to early subacute infarct. There is slight local mass effect without evidence of hemorrhagic transformation. 2. No evidence of new hyperdense intracranial hemorrhage or CT apparent transcortical  infarct in the interim since prior head imaging. 3. Disproportionate enlargement of the supratentorial ventricular system relative to basal cisterns and sulci suspicious for underlying normal pressure hydrocephalus.   MACRO: None   Signed by: Blanca Corbin 5/25/2024 2:59 AM Dictation workstation:   [id number]    Transthoracic Echo (TTE) Complete    Result Date: 5/24/2024   [facility], [address] [place] [address]               Tel [phone] and Fax [phone] TRANSTHORACIC ECHOCARDIOGRAM REPORT  Patient Name:      IRINA PIÑA MARKUS    Reading Physician:    [id number] Ralph Blandon MD Study Date:        5/24/2024            Ordering Provider:    [id number] KUMAR MRN/PID:           [id number]             Fellow: Accession#:        [id number]         Nurse:                Molly Hendricks RN Date of Birth/Age: 1940 / 84 years  Sonographer:          Rosemary Bartholomew RDCS Gender:            M                    Additional Staff: Height:            177.80 cm            Admit Date: Weight:            92.99 kg             Admission Status:     Inpatient -                                                               Routine BSA / BMI:         2.11 m2 / 29.41      Encounter#:           [id number]                    kg/m2                                         Department Location:  [facility] Non                                                               Invasive Blood Pressure: 170 /64 mmHg Study Type:    TRANSTHORACIC ECHO (TTE) COMPLETE Diagnosis/ICD: Transient cerebral ischemic attack, unspecified (NOT on                LCD)-G45.9 Indication:    Transischemic Attack CPT Code:      Echo Complete w Full [id number] Patient  History: Diabetes:          Yes Pertinent History: HTN, Hyperlipidemia and CKD, right side weakness. Study Detail: The following Echo studies were performed: 2D, M-Mode, color flow               and Doppler. Agitated saline used as a contrast agent for               intraseptal flow evaluation. The patient was awake.  PHYSICIAN INTERPRETATION: Left Ventricle: The left ventricular systolic function is normal, with an estimated ejection fraction of 55%. There are no regional wall motion abnormalities. The left ventricular cavity size is upper limits of normal. There is concentric left ventricular hypertrophy. Spectral Doppler shows an impaired relaxation pattern of left ventricular diastolic filling. Left Atrium: The left atrium is normal in size. A bubble study using agitated saline was performed. Bubble study is negative. Right Ventricle: The right ventricle is normal in size. There is normal right ventricular global systolic function. Right Atrium: The right atrium is normal in size. Aortic Valve: The aortic valve is trileaflet. There is minimal aortic valve cusp calcification. There is no evidence of aortic valve stenosis. There is mild aortic valve regurgitation. The peak instantaneous gradient of the aortic valve is 8.7 mmHg. The mean gradient of the aortic valve is 4.9 mmHg. Mitral Valve: The mitral valve is mildly thickened. There is trace to mild mitral valve regurgitation. Tricuspid Valve: The tricuspid valve is structurally normal. There is trace tricuspid regurgitation. The right ventricular systolic pressure is unable to be estimated. Pulmonic Valve: The pulmonic valve is structurally normal. There is trace to mild pulmonic valve regurgitation. Pericardium: There is no pericardial effusion noted. Aorta: The aortic root is abnormal. There is mild dilatation of the ascending aorta. The aortic root is at the upper limits of normal size. Systemic Veins: The inferior vena cava appears to be of normal size.  There is IVC inspiratory collapse greater than 50%. In comparison to the previous echocardiogram(s): There are no prior studies on this patient for comparison purposes.  CONCLUSIONS:  1. Left ventricular systolic function is normal with a 55% estimated ejection fraction.  2. Spectral Doppler shows an impaired relaxation pattern of left ventricular diastolic filling.  3. Mild aortic valve regurgitation.  4. There is mild dilatation of the ascending aorta. QUANTITATIVE DATA SUMMARY: 2D MEASUREMENTS:                           Normal Ranges: IVSd:          1.31 cm    (0.6-1.1cm) LVPWd:         1.30 cm    (0.6-1.1cm) LVIDd:         5.15 cm    (3.9-5.9cm) LVIDs:         3.54 cm LV Mass Index: 130.8 g/m2 LV % FS        31.3 % LA VOLUME:                               Normal Ranges: LA Vol A4C:        36.9 ml    (22+/-6mL/m2) LA Vol A2C:        43.9 ml LA Vol BP:         43.0 ml LA Vol Index A4C:  17.5ml/m2 LA Vol Index A2C:  20.8 ml/m2 LA Vol Index BP:   20.4 ml/m2 LA Area A4C:       15.6 cm2 LA Area A2C:       15.9 cm2 LA Major Axis A4C: 5.6 cm LA Major Axis A2C: 4.9 cm LA Volume Index:   20.5 ml/m2 LA Vol A4C:        33.7 ml LA Vol A2C:        40.8 ml RA VOLUME BY A/L METHOD:                       Normal Ranges: RA Area A4C: 15.0 cm2 AORTA MEASUREMENTS:                      Normal Ranges: Ao Sinus, d: 3.70 cm (2.1-3.5cm) Asc Ao, d:   4.10 cm (2.1-3.4cm) LV SYSTOLIC FUNCTION BY 2D PLANIMETRY (MOD):                     Normal Ranges: EF-A4C View: 57.4 % (>=55%) EF-A2C View: 55.6 % EF-Biplane:  54.6 % LV DIASTOLIC FUNCTION:                               Normal Ranges: MV Peak E:        0.70 m/s    (0.7-1.2 m/s) MV Peak A:        1.06 m/s    (0.42-0.7 m/s) E/A Ratio:        0.66        (1.0-2.2) MV e'             0.05 m/s    (>8.0) MV lateral e'     0.05 m/s MV medial e'      0.07 m/s MV A Dur:         124.57 msec E/e' Ratio:       13.93       (<8.0) PulmV Sys Bernard:    66.89 cm/s PulmV Hoff Bernard:   32.01 cm/s PulmV S/D Bernard:     2.09 PulmV A Revs Bernard: 26.67 cm/s PulmV A Revs Dur: 128.03 msec MITRAL VALVE:                 Normal Ranges: MV DT: 316 msec (150-240msec) AORTIC VALVE:                                   Normal Ranges: AoV Vmax:                1.48 m/s (<=1.7m/s) AoV Peak P.7 mmHg (<20mmHg) AoV Mean P.9 mmHg (1.7-11.5mmHg) LVOT Max Bernard:            1.08 m/s (<=1.1m/s) AoV VTI:                 38.43 cm (18-25cm) LVOT VTI:                27.53 cm LVOT Diameter:           2.04 cm  (1.8-2.4cm) AoV Area, VTI:           2.26 cm2 (2.5-5.5cm2) AoV Area,Vmax:           2.30 cm2 (2.5-4.5cm2) AoV Dimensionless Index: 0.72 AORTIC INSUFFICIENCY: AI Vmax:       4.55 m/s AI Half-time:  853 msec AI Decel Time: 2942 msec AI Decel Rate: 156.37 cm/s2  RIGHT VENTRICLE: RV Basal 3.00 cm RV Mid   2.10 cm RV Major 7.7 cm TAPSE:   26.0 mm RV s'    0.13 m/s TRICUSPID VALVE/RVSP:                             Normal Ranges: Peak TR Velocity: 2.63 m/s RV Syst Pressure: 30.7 mmHg (< 30mmHg) IVC Diam:         1.70 cm PULMONIC VALVE:                      Normal Ranges: PV Max Bernard: 0.9 m/s  (0.6-0.9m/s) PV Max PG:  3.5 mmHg Pulmonary Veins: PulmV A Revs Dur: 128.03 msec PulmV A Revs Bernard: 26.67 cm/s PulmV Hoff Bernard:   32.01 cm/s PulmV S/D Bernard:    2.09 PulmV Sys Bernard:    66.89 cm/s AORTA: Asc Ao Diam 4.18 cm  [id number] Ralph Blandon MD Electronically signed on 2024 at 12:55:37 PM  ** Final **     MR brain wo IV contrast    Result Date: 2024  Interpreted By:  Jose A Alicea, STUDY: MR BRAIN WO IV CONTRAST; MR ANGIO NECK WO IV CONTRAST; MR ANGIO HEAD WO IV CONTRAST;  2024 8:31 pm   INDICATION: Signs/Symptoms:Right sided weakness. Stroke protocol.   COMPARISON: CT head w/o contrast dated 2024, MRI brain 03/15/2018   ACCESSION NUMBER(S): [id number]; [id number]; [id number]   ORDERING CLINICIAN: JULIAN BONE   TECHNIQUE: Multiplanar, multi-sequence images of the brain were obtained without contrast.   3D  time-of-flight MR angiography of the head and neck was performed. The images were reviewed as source images and maximum intensity projections.   FINDINGS: MRI BRAIN:   There is a relatively small area of an subacute ischemic infarct in the medial aspect of the right cerebellar hemisphere within the PICA territory (mildly hyperintense on DWI and intermediate on ADC map). Otherwise, there is no acute ischemic infarct. There are additional areas of gliosis within the right PICA territory representing remote right PICA infarct, which is new from MRI brain 03/15/2018. There is a small area of hemosiderin within the infreromedial-most aspect of this chronic infarct territory.   Mild periventricular and subcortical hemispheric T2/FLAIR white matter hyperintensities are most compatible with chronic small vessel ischemic disease.   There is a partial loss of the expected normal flow void within the visualized portions of the extracranial and intracranial right vertebral artery. This is new from 03/15/2018 MRI brain   There is no acute intraparenchymal hemorrhage, mass, mass-effect, or an extra-axial fluid collection.   Moderate ventriculomegaly, borderline disproportionate within Szymanski ratio of 40.1. No intraventricular hemorrhage.   Normal morphology of midline structures. The craniovertebral junction is normal.   The orbits and globes are unremarkable.   The paranasal sinuses show no air-fluid levels or hemorrhage. Mild left maxillary sinus mucosal thickening and anterior bilateral ethmoid mucosal thickening.   The mastoid air cells are clear.     MRA HEAD: There is complete loss of flow related signal in the proximal intracranial right vertebral artery with mildly decreased flow in the mid to distal intracranial right vertebral artery. There is no hemodynamically significant intracranial stenosis or large vessel occlusion. There is no intracranial aneurysm. Incidental note of a fenestrated anterior communicating artery.  There is a fetal type right posterior cerebral artery.     MRA NECK: Source images are motion degraded. The origins of the carotid and vertebral arteries are not included. Mild spin-dephasing artifact is noted at the carotid bulbs.   COMMON/INTERNAL CAROTID ARTERIES: There is mild atherosclerosis of the right carotid bulb. No occlusion, hemodynamically significant stenosis, or dissection.   VERTEBRAL ARTERIES: There is complete loss of flow related signal in the extracranial right vertebral artery. On the left, no occlusion, hemodynamically significant stenosis, or dissection. There is mild narrowing of the mid V2 segment which is due to undulation by adjacent hypertrophic osteophyte from the uncovertebral joint.         1. 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 2. Aforementioned is superimposed upon a larger chronic right PICA territory infarct which is new from MRI brain 03/15/2018. 3. Mild periventricular and subcortical hemispheric T2/FLAIR white matter hyperintensities are most compatible with chronic small vessel ischemic disease. 4. Borderline disproportionate ventriculomegaly which can be seen with normal pressure hydrocephalus.   MACRO: None.   Signed by: Jose A Alicea 5/23/2024 8:59 PM Dictation workstation:   [id number]    MR angio head wo IV contrast    Result Date: 5/23/2024  Interpreted By:  Jose A Alicea, STUDY: MR BRAIN WO IV CONTRAST; MR ANGIO NECK WO IV CONTRAST; MR ANGIO HEAD WO IV CONTRAST;  5/23/2024 8:31 pm   INDICATION: Signs/Symptoms:Right sided weakness. Stroke protocol.   COMPARISON: CT head w/o contrast dated 5/23/2024, MRI brain 03/15/2018   ACCESSION NUMBER(S): [id number]; [id number]; [id number]   ORDERING CLINICIAN: JULIAN BONE   TECHNIQUE: Multiplanar, multi-sequence images of the brain were obtained without contrast.   3D time-of-flight MR  angiography of the head and neck was performed. The images were reviewed as source images and maximum intensity projections.   FINDINGS: MRI BRAIN:   There is a relatively small area of an subacute ischemic infarct in the medial aspect of the right cerebellar hemisphere within the PICA territory (mildly hyperintense on DWI and intermediate on ADC map). Otherwise, there is no acute ischemic infarct. There are additional areas of gliosis within the right PICA territory representing remote right PICA infarct, which is new from MRI brain 03/15/2018. There is a small area of hemosiderin within the infreromedial-most aspect of this chronic infarct territory.   Mild periventricular and subcortical hemispheric T2/FLAIR white matter hyperintensities are most compatible with chronic small vessel ischemic disease.   There is a partial loss of the expected normal flow void within the visualized portions of the extracranial and intracranial right vertebral artery. This is new from 03/15/2018 MRI brain   There is no acute intraparenchymal hemorrhage, mass, mass-effect, or an extra-axial fluid collection.   Moderate ventriculomegaly, borderline disproportionate within Szymanski ratio of 40.1. No intraventricular hemorrhage.   Normal morphology of midline structures. The craniovertebral junction is normal.   The orbits and globes are unremarkable.   The paranasal sinuses show no air-fluid levels or hemorrhage. Mild left maxillary sinus mucosal thickening and anterior bilateral ethmoid mucosal thickening.   The mastoid air cells are clear.     MRA HEAD: There is complete loss of flow related signal in the proximal intracranial right vertebral artery with mildly decreased flow in the mid to distal intracranial right vertebral artery. There is no hemodynamically significant intracranial stenosis or large vessel occlusion. There is no intracranial aneurysm. Incidental note of a fenestrated anterior communicating artery. There is a fetal  type right posterior cerebral artery.     MRA NECK: Source images are motion degraded. The origins of the carotid and vertebral arteries are not included. Mild spin-dephasing artifact is noted at the carotid bulbs.   COMMON/INTERNAL CAROTID ARTERIES: There is mild atherosclerosis of the right carotid bulb. No occlusion, hemodynamically significant stenosis, or dissection.   VERTEBRAL ARTERIES: There is complete loss of flow related signal in the extracranial right vertebral artery. On the left, no occlusion, hemodynamically significant stenosis, or dissection. There is mild narrowing of the mid V2 segment which is due to undulation by adjacent hypertrophic osteophyte from the uncovertebral joint.         1. 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 2. Aforementioned is superimposed upon a larger chronic right PICA territory infarct which is new from MRI brain 03/15/2018. 3. Mild periventricular and subcortical hemispheric T2/FLAIR white matter hyperintensities are most compatible with chronic small vessel ischemic disease. 4. Borderline disproportionate ventriculomegaly which can be seen with normal pressure hydrocephalus.   MACRO: None.   Signed by: Jose A Alicea 5/23/2024 8:59 PM Dictation workstation:   [id number]    MR angio neck wo IV contrast    Result Date: 5/23/2024  Interpreted By:  Jose A Alicea, STUDY: MR BRAIN WO IV CONTRAST; MR ANGIO NECK WO IV CONTRAST; MR ANGIO HEAD WO IV CONTRAST;  5/23/2024 8:31 pm   INDICATION: Signs/Symptoms:Right sided weakness. Stroke protocol.   COMPARISON: CT head w/o contrast dated 5/23/2024, MRI brain 03/15/2018   ACCESSION NUMBER(S): [id number]; [id number]; [id number]   ORDERING CLINICIAN: JULIAN BONE   TECHNIQUE: Multiplanar, multi-sequence images of the brain were obtained without contrast.   3D time-of-flight MR angiography of the  head and neck was performed. The images were reviewed as source images and maximum intensity projections.   FINDINGS: MRI BRAIN:   There is a relatively small area of an subacute ischemic infarct in the medial aspect of the right cerebellar hemisphere within the PICA territory (mildly hyperintense on DWI and intermediate on ADC map). Otherwise, there is no acute ischemic infarct. There are additional areas of gliosis within the right PICA territory representing remote right PICA infarct, which is new from MRI brain 03/15/2018. There is a small area of hemosiderin within the infreromedial-most aspect of this chronic infarct territory.   Mild periventricular and subcortical hemispheric T2/FLAIR white matter hyperintensities are most compatible with chronic small vessel ischemic disease.   There is a partial loss of the expected normal flow void within the visualized portions of the extracranial and intracranial right vertebral artery. This is new from 03/15/2018 MRI brain   There is no acute intraparenchymal hemorrhage, mass, mass-effect, or an extra-axial fluid collection.   Moderate ventriculomegaly, borderline disproportionate within Szymanski ratio of 40.1. No intraventricular hemorrhage.   Normal morphology of midline structures. The craniovertebral junction is normal.   The orbits and globes are unremarkable.   The paranasal sinuses show no air-fluid levels or hemorrhage. Mild left maxillary sinus mucosal thickening and anterior bilateral ethmoid mucosal thickening.   The mastoid air cells are clear.     MRA HEAD: There is complete loss of flow related signal in the proximal intracranial right vertebral artery with mildly decreased flow in the mid to distal intracranial right vertebral artery. There is no hemodynamically significant intracranial stenosis or large vessel occlusion. There is no intracranial aneurysm. Incidental note of a fenestrated anterior communicating artery. There is a fetal type right posterior  cerebral artery.     MRA NECK: Source images are motion degraded. The origins of the carotid and vertebral arteries are not included. Mild spin-dephasing artifact is noted at the carotid bulbs.   COMMON/INTERNAL CAROTID ARTERIES: There is mild atherosclerosis of the right carotid bulb. No occlusion, hemodynamically significant stenosis, or dissection.   VERTEBRAL ARTERIES: There is complete loss of flow related signal in the extracranial right vertebral artery. On the left, no occlusion, hemodynamically significant stenosis, or dissection. There is mild narrowing of the mid V2 segment which is due to undulation by adjacent hypertrophic osteophyte from the uncovertebral joint.         1. 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 2. Aforementioned is superimposed upon a larger chronic right PICA territory infarct which is new from MRI brain 03/15/2018. 3. Mild periventricular and subcortical hemispheric T2/FLAIR white matter hyperintensities are most compatible with chronic small vessel ischemic disease. 4. Borderline disproportionate ventriculomegaly which can be seen with normal pressure hydrocephalus.   MACRO: None.   Signed by: Jose A Alicea 5/23/2024 8:59 PM Dictation workstation:   [id number]    CT brain attack head wo IV contrast    Result Date: 5/23/2024  Interpreted By:  Sarah Pimentel, STUDY: CT BRAIN ATTACK HEAD WO IV CONTRAST;  5/23/2024 1:58 pm   INDICATION: Signs/Symptoms:Stroke Evaluation.   COMPARISON: Brain MRI 03/15/2018   ACCESSION NUMBER(S): [id number]   ORDERING CLINICIAN: JULIAN BONE   TECHNIQUE: Unenhanced CT images of the head were obtained.   FINDINGS: Diffuse cerebral atrophy with prominence of the extra-axial spaces and cerebral sulci. Moderate ventriculomegaly somewhat out of proportion to the degree of cerebral atrophy. Periventricular white matter hypodensities  consistent with small vessel ischemic disease. These findings are similar to the previous exam. There is no acute intracranial hemorrhage, mass effect or midline shift. No extraaxial fluid collection.   No focal calvarial lesion.   Left maxillary sinus mucosal thickening with thickened, sclerotic walls. Remaining visualized paranasal sinuses are clear.       No acute intracranial hemorrhage or mass-effect.   Moderate ventriculomegaly which could reflect communicating hydrocephalus similar to 03/15/2018 MRI.   Chronic inflammatory changes of the left maxillary sinus.   MACRO: Sarah Pimentel discussed the significance and urgency of this critical finding by telephone with  Steph Burdick on 5/23/2024 at 2:04 pm. (**-RCF-**) Findings:  See findings.     Signed by: Sarah Pimentel 5/23/2024 2:08 PM Dictation workstation:   [id number]      Results for orders placed or performed during the hospital encounter of 05/23/24 (from the past 24 hour(s))   POCT GLUCOSE   Result Value Ref Range    POCT Glucose 230 (H) 74 - 99 mg/dL   POCT GLUCOSE   Result Value Ref Range    POCT Glucose 156 (H) 74 - 99 mg/dL   POCT GLUCOSE   Result Value Ref Range    POCT Glucose 174 (H) 74 - 99 mg/dL   POCT GLUCOSE   Result Value Ref Range    POCT Glucose 212 (H) 74 - 99 mg/dL          Assessment/Plan    Principal Problem:    TIA (transient ischemic attack)    Kevin Donato is a 84 y.o. male with PMH of insulin-dependent T2DM, CKD 3, BPH, HTN, HLD, admitted for TIA and stroke workup.      Acute Medical Issues   #TIA  #s/p Fall   #Right UE and LE weakness - resolved  -LDL 78, triglycerides 143  - Hemoglobin A1c pending  - TTE: EF 55%, mild aortic valve regurg, mild dilatation of ascending aorta  - PT/OT moderate intensity recs  - s/p Aspirin 325 mg p.o. x 1  - Continue DAPT  - Atorvastatin 80 mg p.o. nightly  - Telemetry and pulse ox monitoring  - repeat CTH s/p fall was normal     #mild cognitive impairment complicated by hospital delirium -  improving  - AOx3 today, sitter discontinued.   - melatonin      #HTN  - Restart losartan 50 mg p.o. daily    #constipation  - miralax     Chronic Medical Issues   #Insulin-dependent T2DM  - home regimen: Lantus 35 units every morning  - Add Humalog 4 units TID before meals  -Lispro sliding scale 0-10 units 3 times daily with meals     #Peripheral neuropathy  - Gabapentin 200 mg p.o. twice daily     #BPH  - Oxybutynin 5 mg p.o. twice daily  - home tamsulosin 0.4 p.o. nightly     #vitamin supplementation  -Vitamin D3 2000 units p.o. daily  - Vitamin B12 2000 mcg daily  - Omega-3 daily  - Multivitamin daily     IVF: PRN   Electrolytes: Replete as needed   Diet: Carb controlled  GI ppx: Protonix  DVT ppx: Heparin sub Q  Antibiotics: none  Code: Full Code      Disposition: 84 y.o.male admitted for TIA and stroke workup.  neurology consulted, DAPT,  PT/OT on board, pending placement    Heather Cramer DO

## 2024-05-26 NOTE — CARE PLAN
Problem: General Stroke  Goal: Controlled blood glucose throughout shift  Outcome: Progressing   The patient's goals for the shift include  Patient will have no worsening neurological symptoms during shift    The clinical goals for the shift include pt will be hd stable this shift    Over the shift, the patient made progress towards goal. Patient remained safe and stable and had no worsening neurological symptoms.

## 2024-05-26 NOTE — CARE PLAN
Problem: Safety - Adult  Goal: Free from fall injury  Outcome: Progressing     Problem: Discharge Planning  Goal: Discharge to home or other facility with appropriate resources  Outcome: Progressing     Problem: Chronic Conditions and Co-morbidities  Goal: Patient's chronic conditions and co-morbidity symptoms are monitored and maintained or improved  Outcome: Progressing     Problem: Fall/Injury  Goal: Verbalize understanding of personal risk factors for fall in the hospital  Outcome: Progressing  Goal: Verbalize understanding of risk factor reduction measures to prevent injury from fall in the home  Outcome: Progressing  Goal: Use assistive devices by end of the shift  Outcome: Progressing  Goal: Pace activities to prevent fatigue by end of the shift  Outcome: Progressing     Problem: Diabetes  Goal: Achieve decreasing blood glucose levels by end of shift  Outcome: Progressing  Goal: Increase stability of blood glucose readings by end of shift  Outcome: Progressing  Goal: Decrease in ketones present in urine by end of shift  Outcome: Progressing  Goal: Maintain electrolyte levels within acceptable range throughout shift  Outcome: Progressing  Goal: Maintain glucose levels >70mg/dl to <250mg/dl throughout shift  Outcome: Progressing  Goal: No changes in neurological exam by end of shift  Outcome: Progressing  Goal: Learn about and adhere to nutrition recommendations by end of shift  Outcome: Progressing  Goal: Vital signs within normal range for age by end of shift  Outcome: Progressing  Goal: Increase self care and/or family involovement by end of shift  Outcome: Progressing  Goal: Receive DSME education by end of shift  Outcome: Progressing     Problem: General Stroke  Goal: Establish a mutual long term goal with patient by discharge  Outcome: Progressing  Goal: Demonstrate improvement in neurological exam throughout the shift  Outcome: Progressing  Goal: Controlled blood glucose throughout shift  Outcome:  Progressing     Problem: Fall/Injury  Goal: Not fall by end of shift  Outcome: Met  Goal: Be free from injury by end of the shift  Outcome: Met     Problem: General Stroke  Goal: Maintain BP within ordered limits throughout shift  Outcome: Met  Goal: Participate in treatment (ie., meds, therapy) throughout shift  Outcome: Met  Goal: No symptoms of aspiration throughout shift  Outcome: Met  Goal: No symptoms of hemorrhage throughout shift  Outcome: Met  Goal: Tolerate enteral feeding throughout shift  Outcome: Met  Goal: Decreased nausea/vomiting throughout shift  Outcome: Met

## 2024-05-26 NOTE — PROGRESS NOTES
Occupational Therapy    OT Treatment    Patient Name: Kevin Donato  MRN: [id number]  Today's Date: 5/26/2024  Time Calculation  Start Time: 1221  Stop Time: 1304  Time Calculation (min): 43 min         Assessment:IP OT Assessment  OT Assessment: Pt presents with R side weakness/neglect, R side balance deficits, decreased functional mobility, decreased ADL, decreased safety awareness. Continued skilled OT recommended to maximize pt safety and independence prior to returning home.  Prognosis: Good  Barriers to Discharge: None        Plan:  Treatment Interventions: ADL retraining, Functional transfer training, Endurance training, Compensatory technique education, Neuromuscular reeducation  OT Frequency: 4 times per week  OT Discharge Recommendations: High intensity level of continued care  Equipment Recommended upon Discharge: Wheeled walker  OT Recommended Transfer Status: Assist of 2  OT - OK to Discharge: Yes (per OT POC)  Treatment Interventions: ADL retraining, Functional transfer training, Endurance training, Compensatory technique education, Neuromuscular reeducation    Subjective   Previous Visit Info:  OT Last Visit  OT Received On: 05/26/24  General:  General  Family/Caregiver Present: Yes  Caregiver Feedback:  (Family present for first ~15 mins of session . Requesting info on rehab needs and expressing concerns for pt's fall risk. ADAME provided feed back as appropriate and encourage family to follow up with CC)  General Comment: Chart review completed before session with nothing indicating need to clear pt through nursing. Pt supine with HOB elevated, with all needs and call button within reach at exit. Pt ed to call for assistance for all transfers/oob tasks with pt agreeing, but stating I think I can do it myself. ADAME reiterated importance of calling for assistance to decrease further falls. ADAME followed up with nursing. Bed alarm engaged  Precautions:  Precautions Comment: Medical Chart: Aspiration;  OT Eval Falls  Vital Signs:     Pain:  Pain Assessment  Pain Score: 0 - No pain    Objective    Cognition:  Cognition  Safety/Judgement: Exceptions to WFL  Complex Functional Tasks: Moderate  Insight: Mild  Impulsive: Mildly  Coordination:     Activities of Daily Living: Grooming  Grooming Level of Assistance: Contact guard  Grooming Where Assessed: Edge of bed    LE Dressing  LE Dressing: Yes  LE Dressing Where Assessed: Edge of bed  LE Dressing Comments: Pt donned/doffed socks from eob level requiring increased time to complete with theraputic rest breaks PRN with ADAME providing Mod A for Both L/R socks with pt doffing with CGA  Functional Standing Tolerance:  Time: 6 mins  Activity: Supported standing  Functional Standing Tolerance Comments: Cueing for posture  Bed Mobility/Transfers: Bed Mobility  Bed Mobility: Yes  Bed Mobility 1  Bed Mobility 1: Supine to sitting, Sitting to supine, Scooting, Forward lean  Level of Assistance 1: Contact guard    Transfers  Transfer: Yes  Transfer 1  Transfer From 1: Sit to, Stand to  Trials/Comments 1: Pt completed sit<>stand transfers eob <>ww times 1 trial with multi modal cueing for safety technique with ADAME providing CGA    Sitting Balance:  Static Sitting Balance  Static Sitting-Balance Support: No upper extremity supported  Static Sitting-Level of Assistance: Close supervision  Static Sitting-Comment/Number of Minutes: ~25 mins  Dynamic Sitting Balance  Dynamic Sitting-Balance Support: No upper extremity supported  Dynamic Sitting-Balance: Forward lean, Reaching for objects  Standing Balance:  Static Standing Balance  Static Standing-Balance Support: Bilateral upper extremity supported  Static Standing-Level of Assistance: Contact guard  Static Standing-Comment/Number of Minutes: 6 mins  Therapy/Activity: Therapeutic Activity  Therapeutic Activity Performed: Yes  :     Outcome Measures:[facility] Daily Activity  Putting on and taking off regular lower body clothing: A  lot  Bathing (including washing, rinsing, drying): A lot  Putting on and taking off regular upper body clothing: A little  Toileting, which includes using toilet, bedpan or urinal: A lot  Taking care of personal grooming such as brushing teeth: A little  Eating Meals: A little  Daily Activity - Total Score: 15            Education provided for ww and transfer safety technique as well as importance of calling for assistance for all transfers/oob tasks. ADAME provided ed on benefits of sitting up eob unsupported to promote trunk engagement/strength       Goals:  Encounter Problems       Encounter Problems (Active)       OT Goals       Pt will increase static/dynamic stand to Good to increase safety and independence with functional task completion.   (Progressing)       Start:  05/24/24    Expected End:  06/07/24            Pt will tolerate 10min stand during functional task completion with no more than 1 rest break in order to increase endurance for functional task completion.  (Progressing)       Start:  05/24/24    Expected End:  06/07/24            Pt will demo LE ADL completion with modified independence, using AE if needed.  (Progressing)       Start:  05/24/24    Expected End:  06/07/24            Pt will complete [id number] transfers using LRD in preparation for ADLs with supervision  (Progressing)       Start:  05/24/24    Expected End:  06/07/24            Pt will increase endurance to tolerate 15min of OOB activity with no more than 1 rest break in order to increase ability to engage in ADL completion.  (Progressing)       Start:  05/24/24    Expected End:  06/07/24

## 2024-05-27 LAB
GLUCOSE BLD MANUAL STRIP-MCNC: 217 MG/DL (ref 74–99)
GLUCOSE BLD MANUAL STRIP-MCNC: 239 MG/DL (ref 74–99)
GLUCOSE BLD MANUAL STRIP-MCNC: 276 MG/DL (ref 74–99)
GLUCOSE BLD MANUAL STRIP-MCNC: 292 MG/DL (ref 74–99)

## 2024-05-27 PROCEDURE — 2500000004 HC RX 250 GENERAL PHARMACY W/ HCPCS (ALT 636 FOR OP/ED)

## 2024-05-27 PROCEDURE — 1200000002 HC GENERAL ROOM WITH TELEMETRY DAILY

## 2024-05-27 PROCEDURE — 2500000001 HC RX 250 WO HCPCS SELF ADMINISTERED DRUGS (ALT 637 FOR MEDICARE OP)

## 2024-05-27 PROCEDURE — 2500000001 HC RX 250 WO HCPCS SELF ADMINISTERED DRUGS (ALT 637 FOR MEDICARE OP): Performed by: INTERNAL MEDICINE

## 2024-05-27 PROCEDURE — 2500000002 HC RX 250 W HCPCS SELF ADMINISTERED DRUGS (ALT 637 FOR MEDICARE OP, ALT 636 FOR OP/ED): Performed by: INTERNAL MEDICINE

## 2024-05-27 PROCEDURE — 99231 SBSQ HOSP IP/OBS SF/LOW 25: CPT | Performed by: STUDENT IN AN ORGANIZED HEALTH CARE EDUCATION/TRAINING PROGRAM

## 2024-05-27 PROCEDURE — 2500000002 HC RX 250 W HCPCS SELF ADMINISTERED DRUGS (ALT 637 FOR MEDICARE OP, ALT 636 FOR OP/ED)

## 2024-05-27 PROCEDURE — 82947 ASSAY GLUCOSE BLOOD QUANT: CPT

## 2024-05-27 RX ORDER — POLYETHYLENE GLYCOL 3350 17 G/17G
17 POWDER, FOR SOLUTION ORAL 2 TIMES DAILY
Status: DISCONTINUED | OUTPATIENT
Start: 2024-05-27 | End: 2024-05-28 | Stop reason: HOSPADM

## 2024-05-27 RX ORDER — LOSARTAN POTASSIUM 50 MG/1
100 TABLET ORAL DAILY
Status: DISCONTINUED | OUTPATIENT
Start: 2024-05-28 | End: 2024-05-28 | Stop reason: HOSPADM

## 2024-05-27 RX ORDER — INSULIN GLARGINE 100 [IU]/ML
38 INJECTION, SOLUTION SUBCUTANEOUS EVERY MORNING
Status: DISCONTINUED | OUTPATIENT
Start: 2024-05-28 | End: 2024-05-28 | Stop reason: HOSPADM

## 2024-05-27 RX ADMIN — Medication 6 MG: at 20:53

## 2024-05-27 RX ADMIN — INSULIN LISPRO 6 UNITS: 100 INJECTION, SOLUTION INTRAVENOUS; SUBCUTANEOUS at 12:26

## 2024-05-27 RX ADMIN — HEPARIN SODIUM 5000 UNITS: 5000 INJECTION INTRAVENOUS; SUBCUTANEOUS at 16:56

## 2024-05-27 RX ADMIN — OXYBUTYNIN CHLORIDE 5 MG: 5 TABLET ORAL at 08:46

## 2024-05-27 RX ADMIN — INSULIN LISPRO 4 UNITS: 100 INJECTION, SOLUTION INTRAVENOUS; SUBCUTANEOUS at 16:39

## 2024-05-27 RX ADMIN — LOSARTAN POTASSIUM 50 MG: 50 TABLET, FILM COATED ORAL at 08:46

## 2024-05-27 RX ADMIN — Medication 1 TABLET: at 08:46

## 2024-05-27 RX ADMIN — PANTOPRAZOLE SODIUM 40 MG: 40 TABLET, DELAYED RELEASE ORAL at 06:07

## 2024-05-27 RX ADMIN — OXYBUTYNIN CHLORIDE 5 MG: 5 TABLET ORAL at 20:53

## 2024-05-27 RX ADMIN — GABAPENTIN 200 MG: 100 CAPSULE ORAL at 08:46

## 2024-05-27 RX ADMIN — ASPIRIN 81 MG: 81 TABLET, CHEWABLE ORAL at 08:45

## 2024-05-27 RX ADMIN — GABAPENTIN 200 MG: 100 CAPSULE ORAL at 20:53

## 2024-05-27 RX ADMIN — INSULIN LISPRO 4 UNITS: 100 INJECTION, SOLUTION INTRAVENOUS; SUBCUTANEOUS at 12:24

## 2024-05-27 RX ADMIN — CLOPIDOGREL 75 MG: 75 TABLET ORAL at 08:45

## 2024-05-27 RX ADMIN — INSULIN LISPRO 6 UNITS: 100 INJECTION, SOLUTION INTRAVENOUS; SUBCUTANEOUS at 08:49

## 2024-05-27 RX ADMIN — ICOSAPENT ETHYL 1000 MG: 1 CAPSULE ORAL at 08:46

## 2024-05-27 RX ADMIN — Medication 2000 UNITS: at 08:45

## 2024-05-27 RX ADMIN — ATORVASTATIN CALCIUM 80 MG: 80 TABLET, FILM COATED ORAL at 20:53

## 2024-05-27 RX ADMIN — INSULIN LISPRO 4 UNITS: 100 INJECTION, SOLUTION INTRAVENOUS; SUBCUTANEOUS at 08:46

## 2024-05-27 RX ADMIN — TAMSULOSIN HYDROCHLORIDE 0.4 MG: 0.4 CAPSULE ORAL at 20:53

## 2024-05-27 RX ADMIN — Medication 2000 MCG: at 08:46

## 2024-05-27 RX ADMIN — INSULIN GLARGINE 35 UNITS: 100 INJECTION, SOLUTION SUBCUTANEOUS at 08:46

## 2024-05-27 RX ADMIN — HEPARIN SODIUM 5000 UNITS: 5000 INJECTION INTRAVENOUS; SUBCUTANEOUS at 08:46

## 2024-05-27 RX ADMIN — POLYETHYLENE GLYCOL 3350 17 G: 17 POWDER, FOR SOLUTION ORAL at 08:45

## 2024-05-27 ASSESSMENT — COGNITIVE AND FUNCTIONAL STATUS - GENERAL
STANDING UP FROM CHAIR USING ARMS: A LITTLE
TOILETING: A LOT
MOBILITY SCORE: 18
DRESSING REGULAR UPPER BODY CLOTHING: A LITTLE
DAILY ACTIVITIY SCORE: 15
DRESSING REGULAR LOWER BODY CLOTHING: A LOT
CLIMB 3 TO 5 STEPS WITH RAILING: A LOT
EATING MEALS: A LITTLE
MOVING TO AND FROM BED TO CHAIR: A LITTLE
WALKING IN HOSPITAL ROOM: A LOT
PERSONAL GROOMING: A LITTLE
HELP NEEDED FOR BATHING: A LOT

## 2024-05-27 ASSESSMENT — PAIN - FUNCTIONAL ASSESSMENT
PAIN_FUNCTIONAL_ASSESSMENT: 0-10
PAIN_FUNCTIONAL_ASSESSMENT: 0-10

## 2024-05-27 ASSESSMENT — PAIN SCALES - GENERAL
PAINLEVEL_OUTOF10: 0 - NO PAIN
PAINLEVEL_OUTOF10: 0 - NO PAIN

## 2024-05-27 NOTE — PROGRESS NOTES
05/27/24 1210   Discharge Planning   Living Arrangements Spouse/significant other   Support Systems Spouse/significant other   Type of Residence Private residence   Number of Stairs to Enter Residence 2   Number of Stairs Within Residence 12   Do you have animals or pets at home? Yes   Type of Animals or Pets 1 cat   Who is requesting discharge planning? Provider   Home or Post Acute Services Post acute facilities (Rehab/SNF/etc)   Type of Post Acute Facility Services Rehab;Skilled nursing   Patient expects to be discharged to: Called to bedside by spouse. Per spouse their preferences for SNF are as follows: [facility]. [facility]. Wife asking [facility] about sitter at SNF and this TCC informed wife that is not likely SNF will provide sitter. Wife would like TCC to confirm with SNF that wife can stay with patient then. Referrals sent in [facility] with messages asking facilities about if the wife will be able to stay with patient. Awaiting facility response at this time.   Does the patient need discharge transport arranged? Yes   RoundTrip coordination needed? Yes   Has discharge transport been arranged? No

## 2024-05-27 NOTE — PROGRESS NOTES
Kevin Donato is a 84 y.o. male on day 2 of admission presenting with TIA (transient ischemic attack).    Subjective   Patient seen at bedside.   patient denies any pain, no return of any of his neurological symptoms. Wants something to help him have a bowel movement.  Denies fever, chills, chest pain, shortness of breath, headache, abdominal pain.     Objective     Last Recorded Vitals  /71 (BP Location: Right arm, Patient Position: Lying)   Pulse 59   Temp 36 °C (96.8 °F) (Temporal)   Resp 17   Wt 93.4 kg (205 lb 14.6 oz)   SpO2 91%   Intake/Output last 3 Shifts:    Intake/Output Summary (Last 24 hours) at 5/27/2024 1250  Last data filed at 5/27/2024 1000  Gross per 24 hour   Intake 1280 ml   Output 1200 ml   Net 80 ml       Admission Weight  Weight: 94.4 kg (208 lb 1.8 oz) (05/23/24 1355)    Daily Weight  05/23/24 : 93.4 kg (205 lb 14.6 oz)    Image Results  CT head wo IV contrast  Narrative: Interpreted By:  Blanca Corbin,   STUDY:  CT HEAD WO IV CONTRAST;  5/24/2024 11:34 pm      INDICATION:  Signs/Symptoms:fall.      COMPARISON:  CT head dated 05/23/2024; MRI of the brain and MRA of the head and  neck dated 05/23/2024      ACCESSION NUMBER(S):  [id number]      ORDERING CLINICIAN:  SURESH PHELAN      TECHNIQUE:  Noncontrast axial CT scan of head was performed. Angled reformats in  brain and bone windows were generated. The images were reviewed in  bone, brain, blood and soft tissue windows.      FINDINGS:  No hyperdense intracranial hemorrhage is evident. There is no  evidence of midline shift.      Small focus of diminished attenuation is present in the posterior  right cerebellum, corresponding to area of diffusion restriction  described on previous MRI of the brain dated 05/23/2024, consistent  with an evolving subacute infarct.      Gray-white differentiation is intact, without evidence of new CT  apparent transcortical infarct.      There is disproportionate enlargement of the  supratentorial  ventricular system relative to basal cisterns and sulci, similar in  appearance to prior exam, suspicious for underlying normal pressure  hydrocephalus. No extra-axial collections are identified. Basal  cisterns are patent.      Scalp soft tissues do not demonstrate any acute abnormality.  Calvarium is unremarkable. Mastoid air cells and middle ear cavities  are clear.      There is mucosal thickening present in the left maxillary sinus with  asymmetric bony hyperostosis suggestive of changes of chronic  sinusitis.      Impression: 1. Small focus of diminished attenuation is present in the posterior  right cerebellum, corresponding to area of diffusion restriction  described on previous MRI dated 05/23/2024, distant with a evolving  acute to early subacute infarct. There is slight local mass effect  without evidence of hemorrhagic transformation.  2. No evidence of new hyperdense intracranial hemorrhage or CT  apparent transcortical infarct in the interim since prior head  imaging.  3. Disproportionate enlargement of the supratentorial ventricular  system relative to basal cisterns and sulci suspicious for underlying  normal pressure hydrocephalus.      MACRO:  None      Signed by: Blanca Corbin 5/25/2024 2:59 AM  Dictation workstation:   [id number]      Physical Exam  Constitutional:       Appearance: Normal appearance.   Cardiovascular:      Rate and Rhythm: Normal rate and regular rhythm.      Pulses: Normal pulses.      Heart sounds: Normal heart sounds.   Pulmonary:      Effort: Pulmonary effort is normal.      Breath sounds: Normal breath sounds.   Abdominal:      General: Bowel sounds are normal.      Palpations: Abdomen is soft.   Musculoskeletal:         General: Normal range of motion.   Skin:     General: Skin is warm and dry.   Neurological:      General: No focal deficit present.      Mental Status: He is alert and oriented to person, place, and time.   Psychiatric:          Behavior: Behavior normal.         Relevant Results  CT head wo IV contrast    Result Date: 5/25/2024  Interpreted By:  Blanca Corbin, STUDY: CT HEAD WO IV CONTRAST;  5/24/2024 11:34 pm   INDICATION: Signs/Symptoms:fall.   COMPARISON: CT head dated 05/23/2024; MRI of the brain and MRA of the head and neck dated 05/23/2024   ACCESSION NUMBER(S): [id number]   ORDERING CLINICIAN: SURESH PHELAN   TECHNIQUE: Noncontrast axial CT scan of head was performed. Angled reformats in brain and bone windows were generated. The images were reviewed in bone, brain, blood and soft tissue windows.   FINDINGS: No hyperdense intracranial hemorrhage is evident. There is no evidence of midline shift.   Small focus of diminished attenuation is present in the posterior right cerebellum, corresponding to area of diffusion restriction described on previous MRI of the brain dated 05/23/2024, consistent with an evolving subacute infarct.   Gray-white differentiation is intact, without evidence of new CT apparent transcortical infarct.   There is disproportionate enlargement of the supratentorial ventricular system relative to basal cisterns and sulci, similar in appearance to prior exam, suspicious for underlying normal pressure hydrocephalus. No extra-axial collections are identified. Basal cisterns are patent.   Scalp soft tissues do not demonstrate any acute abnormality. Calvarium is unremarkable. Mastoid air cells and middle ear cavities are clear.   There is mucosal thickening present in the left maxillary sinus with asymmetric bony hyperostosis suggestive of changes of chronic sinusitis.       1. Small focus of diminished attenuation is present in the posterior right cerebellum, corresponding to area of diffusion restriction described on previous MRI dated 05/23/2024, distant with a evolving acute to early subacute infarct. There is slight local mass effect without evidence of hemorrhagic transformation. 2. No evidence of new  hyperdense intracranial hemorrhage or CT apparent transcortical infarct in the interim since prior head imaging. 3. Disproportionate enlargement of the supratentorial ventricular system relative to basal cisterns and sulci suspicious for underlying normal pressure hydrocephalus.   MACRO: None   Signed by: Blanca Corbin 5/25/2024 2:59 AM Dictation workstation:   [id number]    Transthoracic Echo (TTE) Complete    Result Date: 5/24/2024   [facility], [address] [place] [address]               Tel [phone] and Fax [phone] TRANSTHORACIC ECHOCARDIOGRAM REPORT  Patient Name:      IRINA PIÑA MARKUS Gutierrez Physician:    [id number] Ralph Blandon MD Study Date:        5/24/2024            Ordering Provider:    [id number] KUMAR MRN/PID:           [id number]             Fellow: Accession#:        [id number]         Nurse:                Molly Hendricks RN Date of Birth/Age: 1940 / 84 years  Sonographer:          Rosemary Bartholomew RDCS Gender:            M                    Additional Staff: Height:            177.80 cm            Admit Date: Weight:            92.99 kg             Admission Status:     Inpatient -                                                               Routine BSA / BMI:         2.11 m2 / 29.41      Encounter#:           [id number]                    kg/m2                                         Department Location:  [facility] Non                                                               Invasive Blood Pressure: 170 /64 mmHg Study Type:    TRANSTHORACIC ECHO (TTE) COMPLETE Diagnosis/ICD: Transient cerebral ischemic attack, unspecified (NOT on                LCD)-G45.9 Indication:    Transischemic Attack  CPT Code:      Echo Complete w Full Doppler-20245 Patient History: Diabetes:          Yes Pertinent History: HTN, Hyperlipidemia and CKD, right side weakness. Study Detail: The following Echo studies were performed: 2D, M-Mode, color flow               and Doppler. Agitated saline used as a contrast agent for               intraseptal flow evaluation. The patient was awake.  PHYSICIAN INTERPRETATION: Left Ventricle: The left ventricular systolic function is normal, with an estimated ejection fraction of 55%. There are no regional wall motion abnormalities. The left ventricular cavity size is upper limits of normal. There is concentric left ventricular hypertrophy. Spectral Doppler shows an impaired relaxation pattern of left ventricular diastolic filling. Left Atrium: The left atrium is normal in size. A bubble study using agitated saline was performed. Bubble study is negative. Right Ventricle: The right ventricle is normal in size. There is normal right ventricular global systolic function. Right Atrium: The right atrium is normal in size. Aortic Valve: The aortic valve is trileaflet. There is minimal aortic valve cusp calcification. There is no evidence of aortic valve stenosis. There is mild aortic valve regurgitation. The peak instantaneous gradient of the aortic valve is 8.7 mmHg. The mean gradient of the aortic valve is 4.9 mmHg. Mitral Valve: The mitral valve is mildly thickened. There is trace to mild mitral valve regurgitation. Tricuspid Valve: The tricuspid valve is structurally normal. There is trace tricuspid regurgitation. The right ventricular systolic pressure is unable to be estimated. Pulmonic Valve: The pulmonic valve is structurally normal. There is trace to mild pulmonic valve regurgitation. Pericardium: There is no pericardial effusion noted. Aorta: The aortic root is abnormal. There is mild dilatation of the ascending aorta. The aortic root is at the upper limits of normal size. Systemic  Veins: The inferior vena cava appears to be of normal size. There is IVC inspiratory collapse greater than 50%. In comparison to the previous echocardiogram(s): There are no prior studies on this patient for comparison purposes.  CONCLUSIONS:  1. Left ventricular systolic function is normal with a 55% estimated ejection fraction.  2. Spectral Doppler shows an impaired relaxation pattern of left ventricular diastolic filling.  3. Mild aortic valve regurgitation.  4. There is mild dilatation of the ascending aorta. QUANTITATIVE DATA SUMMARY: 2D MEASUREMENTS:                           Normal Ranges: IVSd:          1.31 cm    (0.6-1.1cm) LVPWd:         1.30 cm    (0.6-1.1cm) LVIDd:         5.15 cm    (3.9-5.9cm) LVIDs:         3.54 cm LV Mass Index: 130.8 g/m2 LV % FS        31.3 % LA VOLUME:                               Normal Ranges: LA Vol A4C:        36.9 ml    (22+/-6mL/m2) LA Vol A2C:        43.9 ml LA Vol BP:         43.0 ml LA Vol Index A4C:  17.5ml/m2 LA Vol Index A2C:  20.8 ml/m2 LA Vol Index BP:   20.4 ml/m2 LA Area A4C:       15.6 cm2 LA Area A2C:       15.9 cm2 LA Major Axis A4C: 5.6 cm LA Major Axis A2C: 4.9 cm LA Volume Index:   20.5 ml/m2 LA Vol A4C:        33.7 ml LA Vol A2C:        40.8 ml RA VOLUME BY A/L METHOD:                       Normal Ranges: RA Area A4C: 15.0 cm2 AORTA MEASUREMENTS:                      Normal Ranges: Ao Sinus, d: 3.70 cm (2.1-3.5cm) Asc Ao, d:   4.10 cm (2.1-3.4cm) LV SYSTOLIC FUNCTION BY 2D PLANIMETRY (MOD):                     Normal Ranges: EF-A4C View: 57.4 % (>=55%) EF-A2C View: 55.6 % EF-Biplane:  54.6 % LV DIASTOLIC FUNCTION:                               Normal Ranges: MV Peak E:        0.70 m/s    (0.7-1.2 m/s) MV Peak A:        1.06 m/s    (0.42-0.7 m/s) E/A Ratio:        0.66        (1.0-2.2) MV e'             0.05 m/s    (>8.0) MV lateral e'     0.05 m/s MV medial e'      0.07 m/s MV A Dur:         124.57 msec E/e' Ratio:       13.93       (<8.0) PulmV Sys Bernard:     66.89 cm/s PulmV Hoff Bernard:   32.01 cm/s PulmV S/D Bernard:    2.09 PulmV A Revs Bernard: 26.67 cm/s PulmV A Revs Dur: 128.03 msec MITRAL VALVE:                 Normal Ranges: MV DT: 316 msec (150-240msec) AORTIC VALVE:                                   Normal Ranges: AoV Vmax:                1.48 m/s (<=1.7m/s) AoV Peak P.7 mmHg (<20mmHg) AoV Mean P.9 mmHg (1.7-11.5mmHg) LVOT Max Bernard:            1.08 m/s (<=1.1m/s) AoV VTI:                 38.43 cm (18-25cm) LVOT VTI:                27.53 cm LVOT Diameter:           2.04 cm  (1.8-2.4cm) AoV Area, VTI:           2.26 cm2 (2.5-5.5cm2) AoV Area,Vmax:           2.30 cm2 (2.5-4.5cm2) AoV Dimensionless Index: 0.72 AORTIC INSUFFICIENCY: AI Vmax:       4.55 m/s AI Half-time:  853 msec AI Decel Time: 2942 msec AI Decel Rate: 156.37 cm/s2  RIGHT VENTRICLE: RV Basal 3.00 cm RV Mid   2.10 cm RV Major 7.7 cm TAPSE:   26.0 mm RV s'    0.13 m/s TRICUSPID VALVE/RVSP:                             Normal Ranges: Peak TR Velocity: 2.63 m/s RV Syst Pressure: 30.7 mmHg (< 30mmHg) IVC Diam:         1.70 cm PULMONIC VALVE:                      Normal Ranges: PV Max Bernard: 0.9 m/s  (0.6-0.9m/s) PV Max PG:  3.5 mmHg Pulmonary Veins: PulmV A Revs Dur: 128.03 msec PulmV A Revs Bernard: 26.67 cm/s PulmV Hoff Bernard:   32.01 cm/s PulmV S/D Bernard:    2.09 PulmV Sys Bernard:    66.89 cm/s AORTA: Asc Ao Diam 4.18 cm  [id number] Ralph Blandon MD Electronically signed on 2024 at 12:55:37 PM  ** Final **     MR brain wo IV contrast    Result Date: 2024  Interpreted By:  Jose A Alicea, STUDY: MR BRAIN WO IV CONTRAST; MR ANGIO NECK WO IV CONTRAST; MR ANGIO HEAD WO IV CONTRAST;  2024 8:31 pm   INDICATION: Signs/Symptoms:Right sided weakness. Stroke protocol.   COMPARISON: CT head w/o contrast dated 2024, MRI brain 03/15/2018   ACCESSION NUMBER(S): [id number]; [id number]; [id number]   ORDERING CLINICIAN: JULIAN BONE   TECHNIQUE: Multiplanar, multi-sequence images of  the brain were obtained without contrast.   3D time-of-flight MR angiography of the head and neck was performed. The images were reviewed as source images and maximum intensity projections.   FINDINGS: MRI BRAIN:   There is a relatively small area of an subacute ischemic infarct in the medial aspect of the right cerebellar hemisphere within the PICA territory (mildly hyperintense on DWI and intermediate on ADC map). Otherwise, there is no acute ischemic infarct. There are additional areas of gliosis within the right PICA territory representing remote right PICA infarct, which is new from MRI brain 03/15/2018. There is a small area of hemosiderin within the infreromedial-most aspect of this chronic infarct territory.   Mild periventricular and subcortical hemispheric T2/FLAIR white matter hyperintensities are most compatible with chronic small vessel ischemic disease.   There is a partial loss of the expected normal flow void within the visualized portions of the extracranial and intracranial right vertebral artery. This is new from 03/15/2018 MRI brain   There is no acute intraparenchymal hemorrhage, mass, mass-effect, or an extra-axial fluid collection.   Moderate ventriculomegaly, borderline disproportionate within Szymanski ratio of 40.1. No intraventricular hemorrhage.   Normal morphology of midline structures. The craniovertebral junction is normal.   The orbits and globes are unremarkable.   The paranasal sinuses show no air-fluid levels or hemorrhage. Mild left maxillary sinus mucosal thickening and anterior bilateral ethmoid mucosal thickening.   The mastoid air cells are clear.     MRA HEAD: There is complete loss of flow related signal in the proximal intracranial right vertebral artery with mildly decreased flow in the mid to distal intracranial right vertebral artery. There is no hemodynamically significant intracranial stenosis or large vessel occlusion. There is no intracranial aneurysm. Incidental note of  a fenestrated anterior communicating artery. There is a fetal type right posterior cerebral artery.     MRA NECK: Source images are motion degraded. The origins of the carotid and vertebral arteries are not included. Mild spin-dephasing artifact is noted at the carotid bulbs.   COMMON/INTERNAL CAROTID ARTERIES: There is mild atherosclerosis of the right carotid bulb. No occlusion, hemodynamically significant stenosis, or dissection.   VERTEBRAL ARTERIES: There is complete loss of flow related signal in the extracranial right vertebral artery. On the left, no occlusion, hemodynamically significant stenosis, or dissection. There is mild narrowing of the mid V2 segment which is due to undulation by adjacent hypertrophic osteophyte from the uncovertebral joint.         1. 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 2. Aforementioned is superimposed upon a larger chronic right PICA territory infarct which is new from MRI brain 03/15/2018. 3. Mild periventricular and subcortical hemispheric T2/FLAIR white matter hyperintensities are most compatible with chronic small vessel ischemic disease. 4. Borderline disproportionate ventriculomegaly which can be seen with normal pressure hydrocephalus.   MACRO: None.   Signed by: Jose A Alicea 5/23/2024 8:59 PM Dictation workstation:   [id number]    MR angio head wo IV contrast    Result Date: 5/23/2024  Interpreted By:  Jose A Alicea, STUDY: MR BRAIN WO IV CONTRAST; MR ANGIO NECK WO IV CONTRAST; MR ANGIO HEAD WO IV CONTRAST;  5/23/2024 8:31 pm   INDICATION: Signs/Symptoms:Right sided weakness. Stroke protocol.   COMPARISON: CT head w/o contrast dated 5/23/2024, MRI brain 03/15/2018   ACCESSION NUMBER(S): [id number]; [id number]; [id number]   ORDERING CLINICIAN: JULIAN BONE   TECHNIQUE: Multiplanar, multi-sequence images of the brain were obtained  without contrast.   3D time-of-flight MR angiography of the head and neck was performed. The images were reviewed as source images and maximum intensity projections.   FINDINGS: MRI BRAIN:   There is a relatively small area of an subacute ischemic infarct in the medial aspect of the right cerebellar hemisphere within the PICA territory (mildly hyperintense on DWI and intermediate on ADC map). Otherwise, there is no acute ischemic infarct. There are additional areas of gliosis within the right PICA territory representing remote right PICA infarct, which is new from MRI brain 03/15/2018. There is a small area of hemosiderin within the infreromedial-most aspect of this chronic infarct territory.   Mild periventricular and subcortical hemispheric T2/FLAIR white matter hyperintensities are most compatible with chronic small vessel ischemic disease.   There is a partial loss of the expected normal flow void within the visualized portions of the extracranial and intracranial right vertebral artery. This is new from 03/15/2018 MRI brain   There is no acute intraparenchymal hemorrhage, mass, mass-effect, or an extra-axial fluid collection.   Moderate ventriculomegaly, borderline disproportionate within Szymanski ratio of 40.1. No intraventricular hemorrhage.   Normal morphology of midline structures. The craniovertebral junction is normal.   The orbits and globes are unremarkable.   The paranasal sinuses show no air-fluid levels or hemorrhage. Mild left maxillary sinus mucosal thickening and anterior bilateral ethmoid mucosal thickening.   The mastoid air cells are clear.     MRA HEAD: There is complete loss of flow related signal in the proximal intracranial right vertebral artery with mildly decreased flow in the mid to distal intracranial right vertebral artery. There is no hemodynamically significant intracranial stenosis or large vessel occlusion. There is no intracranial aneurysm. Incidental note of a fenestrated anterior  communicating artery. There is a fetal type right posterior cerebral artery.     MRA NECK: Source images are motion degraded. The origins of the carotid and vertebral arteries are not included. Mild spin-dephasing artifact is noted at the carotid bulbs.   COMMON/INTERNAL CAROTID ARTERIES: There is mild atherosclerosis of the right carotid bulb. No occlusion, hemodynamically significant stenosis, or dissection.   VERTEBRAL ARTERIES: There is complete loss of flow related signal in the extracranial right vertebral artery. On the left, no occlusion, hemodynamically significant stenosis, or dissection. There is mild narrowing of the mid V2 segment which is due to undulation by adjacent hypertrophic osteophyte from the uncovertebral joint.         1. 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 2. Aforementioned is superimposed upon a larger chronic right PICA territory infarct which is new from MRI brain 03/15/2018. 3. Mild periventricular and subcortical hemispheric T2/FLAIR white matter hyperintensities are most compatible with chronic small vessel ischemic disease. 4. Borderline disproportionate ventriculomegaly which can be seen with normal pressure hydrocephalus.   MACRO: None.   Signed by: Jose A Alicea 5/23/2024 8:59 PM Dictation workstation:   [id number]    MR angio neck wo IV contrast    Result Date: 5/23/2024  Interpreted By:  Jose A Alicea, STUDY: MR BRAIN WO IV CONTRAST; MR ANGIO NECK WO IV CONTRAST; MR ANGIO HEAD WO IV CONTRAST;  5/23/2024 8:31 pm   INDICATION: Signs/Symptoms:Right sided weakness. Stroke protocol.   COMPARISON: CT head w/o contrast dated 5/23/2024, MRI brain 03/15/2018   ACCESSION NUMBER(S): [id number]; [id number]; [id number]   ORDERING CLINICIAN: JULIAN BONE   TECHNIQUE: Multiplanar, multi-sequence images of the brain were obtained without contrast.   3D  time-of-flight MR angiography of the head and neck was performed. The images were reviewed as source images and maximum intensity projections.   FINDINGS: MRI BRAIN:   There is a relatively small area of an subacute ischemic infarct in the medial aspect of the right cerebellar hemisphere within the PICA territory (mildly hyperintense on DWI and intermediate on ADC map). Otherwise, there is no acute ischemic infarct. There are additional areas of gliosis within the right PICA territory representing remote right PICA infarct, which is new from MRI brain 03/15/2018. There is a small area of hemosiderin within the infreromedial-most aspect of this chronic infarct territory.   Mild periventricular and subcortical hemispheric T2/FLAIR white matter hyperintensities are most compatible with chronic small vessel ischemic disease.   There is a partial loss of the expected normal flow void within the visualized portions of the extracranial and intracranial right vertebral artery. This is new from 03/15/2018 MRI brain   There is no acute intraparenchymal hemorrhage, mass, mass-effect, or an extra-axial fluid collection.   Moderate ventriculomegaly, borderline disproportionate within Szymanski ratio of 40.1. No intraventricular hemorrhage.   Normal morphology of midline structures. The craniovertebral junction is normal.   The orbits and globes are unremarkable.   The paranasal sinuses show no air-fluid levels or hemorrhage. Mild left maxillary sinus mucosal thickening and anterior bilateral ethmoid mucosal thickening.   The mastoid air cells are clear.     MRA HEAD: There is complete loss of flow related signal in the proximal intracranial right vertebral artery with mildly decreased flow in the mid to distal intracranial right vertebral artery. There is no hemodynamically significant intracranial stenosis or large vessel occlusion. There is no intracranial aneurysm. Incidental note of a fenestrated anterior communicating artery.  There is a fetal type right posterior cerebral artery.     MRA NECK: Source images are motion degraded. The origins of the carotid and vertebral arteries are not included. Mild spin-dephasing artifact is noted at the carotid bulbs.   COMMON/INTERNAL CAROTID ARTERIES: There is mild atherosclerosis of the right carotid bulb. No occlusion, hemodynamically significant stenosis, or dissection.   VERTEBRAL ARTERIES: There is complete loss of flow related signal in the extracranial right vertebral artery. On the left, no occlusion, hemodynamically significant stenosis, or dissection. There is mild narrowing of the mid V2 segment which is due to undulation by adjacent hypertrophic osteophyte from the uncovertebral joint.         1. 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 2. Aforementioned is superimposed upon a larger chronic right PICA territory infarct which is new from MRI brain 03/15/2018. 3. Mild periventricular and subcortical hemispheric T2/FLAIR white matter hyperintensities are most compatible with chronic small vessel ischemic disease. 4. Borderline disproportionate ventriculomegaly which can be seen with normal pressure hydrocephalus.   MACRO: None.   Signed by: Jose A Alicea 5/23/2024 8:59 PM Dictation workstation:   [id number]    CT brain attack head wo IV contrast    Result Date: 5/23/2024  Interpreted By:  Sarah Pimentel, STUDY: CT BRAIN ATTACK HEAD WO IV CONTRAST;  5/23/2024 1:58 pm   INDICATION: Signs/Symptoms:Stroke Evaluation.   COMPARISON: Brain MRI 03/15/2018   ACCESSION NUMBER(S): [id number]   ORDERING CLINICIAN: JULIAN BONE   TECHNIQUE: Unenhanced CT images of the head were obtained.   FINDINGS: Diffuse cerebral atrophy with prominence of the extra-axial spaces and cerebral sulci. Moderate ventriculomegaly somewhat out of proportion to the degree of cerebral atrophy.  Periventricular white matter hypodensities consistent with small vessel ischemic disease. These findings are similar to the previous exam. There is no acute intracranial hemorrhage, mass effect or midline shift. No extraaxial fluid collection.   No focal calvarial lesion.   Left maxillary sinus mucosal thickening with thickened, sclerotic walls. Remaining visualized paranasal sinuses are clear.       No acute intracranial hemorrhage or mass-effect.   Moderate ventriculomegaly which could reflect communicating hydrocephalus similar to 03/15/2018 MRI.   Chronic inflammatory changes of the left maxillary sinus.   MACRO: Sarah Pimentel discussed the significance and urgency of this critical finding by telephone with  Steph Burdick on 5/23/2024 at 2:04 pm. (**-RCF-**) Findings:  See findings.     Signed by: Sarah Pimentel 5/23/2024 2:08 PM Dictation workstation:   [id number]      Results for orders placed or performed during the hospital encounter of 05/23/24 (from the past 24 hour(s))   POCT GLUCOSE   Result Value Ref Range    POCT Glucose 142 (H) 74 - 99 mg/dL   POCT GLUCOSE   Result Value Ref Range    POCT Glucose 187 (H) 74 - 99 mg/dL   POCT GLUCOSE   Result Value Ref Range    POCT Glucose 292 (H) 74 - 99 mg/dL   POCT GLUCOSE   Result Value Ref Range    POCT Glucose 276 (H) 74 - 99 mg/dL          Assessment/Plan    Principal Problem:    TIA (transient ischemic attack)    Kevin Donato is a 84 y.o. male with PMH of insulin-dependent T2DM, CKD 3, BPH, HTN, HLD, admitted for TIA and stroke workup.      Acute Medical Issues   #TIA  #s/p Fall   #Right UE and LE weakness - resolved  -LDL 78, triglycerides 143  - Hemoglobin A1c 8.1  - TTE: EF 55%, mild aortic valve regurg, mild dilatation of ascending aorta  - PT/OT moderate intensity recs  - Continue DAPT with Aspirin and Plavix  - Atorvastatin 80 mg p.o. nightly  - Telemetry and pulse ox monitoring  - repeat CTH s/p fall was normal     #mild cognitive impairment  complicated by hospital delirium - improving  - AOx3 today, sitter discontinued.   - melatonin      #HTN  - losartan 100 mg p.o. daily    #constipation  - miralax     Chronic Medical Issues   #Insulin-dependent T2DM  - home regimen: Lantus 38 units every morning  - Humalog 4 units TID before meals  -Lispro sliding scale 0-10 units 3 times daily with meals     #Peripheral neuropathy  - Gabapentin 200 mg p.o. twice daily     #BPH  - Oxybutynin 5 mg p.o. twice daily  - home tamsulosin 0.4 p.o. nightly     #vitamin supplementation  -Vitamin D3 2000 units p.o. daily  - Vitamin B12 2000 mcg daily  - Omega-3 daily  - Multivitamin daily     IVF: PRN   Electrolytes: Replete as needed   Diet: Carb controlled  GI ppx: Protonix  DVT ppx: Heparin sub Q  Antibiotics: none  Code: Full Code      Disposition: 84 y.o.male admitted for TIA and stroke workup.  neurology consulted, DAPT,  PT/OT on board, pending placement    Heather Cramer DO

## 2024-05-27 NOTE — CARE PLAN
Problem: Safety - Adult  Goal: Free from fall injury  Outcome: Progressing     Problem: Discharge Planning  Goal: Discharge to home or other facility with appropriate resources  Outcome: Progressing     Problem: Chronic Conditions and Co-morbidities  Goal: Patient's chronic conditions and co-morbidity symptoms are monitored and maintained or improved  Outcome: Progressing     Problem: Fall/Injury  Goal: Verbalize understanding of personal risk factors for fall in the hospital  Outcome: Progressing  Goal: Verbalize understanding of risk factor reduction measures to prevent injury from fall in the home  Outcome: Progressing  Goal: Use assistive devices by end of the shift  Outcome: Progressing  Goal: Pace activities to prevent fatigue by end of the shift  Outcome: Progressing     Problem: Diabetes  Goal: Achieve decreasing blood glucose levels by end of shift  Outcome: Progressing  Goal: Increase stability of blood glucose readings by end of shift  Outcome: Progressing  Goal: Decrease in ketones present in urine by end of shift  Outcome: Progressing  Goal: Maintain electrolyte levels within acceptable range throughout shift  Outcome: Progressing  Goal: Maintain glucose levels >70mg/dl to <250mg/dl throughout shift  Outcome: Progressing  Goal: No changes in neurological exam by end of shift  Outcome: Progressing  Goal: Learn about and adhere to nutrition recommendations by end of shift  Outcome: Progressing  Goal: Vital signs within normal range for age by end of shift  Outcome: Progressing  Goal: Increase self care and/or family involovement by end of shift  Outcome: Progressing  Goal: Receive DSME education by end of shift  Outcome: Progressing     Problem: General Stroke  Goal: Establish a mutual long term goal with patient by discharge  Outcome: Progressing  Goal: Demonstrate improvement in neurological exam throughout the shift  Outcome: Progressing  Goal: Controlled blood glucose throughout shift  Outcome:  Progressing  Goal: Out of bed three times today  Outcome: Progressing

## 2024-05-27 NOTE — CARE PLAN
The patient's goals for the shift include      The clinical goals for the shift include Neuro status will remain stable and pt will remain safe this shift.    Over the shift, the patient did make progress toward the following goals. Pt did not experience further stroke symptoms and remained safe. Monitored and medicated as ordered this shift.

## 2024-05-28 VITALS
BODY MASS INDEX: 29.48 KG/M2 | OXYGEN SATURATION: 97 % | RESPIRATION RATE: 14 BRPM | DIASTOLIC BLOOD PRESSURE: 57 MMHG | WEIGHT: 205.91 LBS | TEMPERATURE: 97 F | HEART RATE: 60 BPM | HEIGHT: 70 IN | SYSTOLIC BLOOD PRESSURE: 100 MMHG

## 2024-05-28 PROBLEM — G45.9 TIA (TRANSIENT ISCHEMIC ATTACK): Status: RESOLVED | Noted: 2024-05-23 | Resolved: 2024-05-28

## 2024-05-28 LAB
ACHR MOD AB/ACHR TOTAL SFR SER: 0 %
GLUCOSE BLD MANUAL STRIP-MCNC: 197 MG/DL (ref 74–99)
GLUCOSE BLD MANUAL STRIP-MCNC: 203 MG/DL (ref 74–99)

## 2024-05-28 PROCEDURE — 97535 SELF CARE MNGMENT TRAINING: CPT | Mod: GO

## 2024-05-28 PROCEDURE — 2500000004 HC RX 250 GENERAL PHARMACY W/ HCPCS (ALT 636 FOR OP/ED)

## 2024-05-28 PROCEDURE — 2500000001 HC RX 250 WO HCPCS SELF ADMINISTERED DRUGS (ALT 637 FOR MEDICARE OP): Performed by: INTERNAL MEDICINE

## 2024-05-28 PROCEDURE — 2500000001 HC RX 250 WO HCPCS SELF ADMINISTERED DRUGS (ALT 637 FOR MEDICARE OP)

## 2024-05-28 PROCEDURE — 82947 ASSAY GLUCOSE BLOOD QUANT: CPT

## 2024-05-28 PROCEDURE — 2500000002 HC RX 250 W HCPCS SELF ADMINISTERED DRUGS (ALT 637 FOR MEDICARE OP, ALT 636 FOR OP/ED)

## 2024-05-28 PROCEDURE — 97530 THERAPEUTIC ACTIVITIES: CPT | Mod: GO

## 2024-05-28 PROCEDURE — 99239 HOSP IP/OBS DSCHRG MGMT >30: CPT

## 2024-05-28 RX ORDER — LOSARTAN POTASSIUM 100 MG/1
100 TABLET ORAL DAILY
Qty: 30 TABLET | Refills: 0 | Status: SHIPPED | OUTPATIENT
Start: 2024-05-29 | End: 2024-06-28

## 2024-05-28 RX ORDER — NAPROXEN SODIUM 220 MG/1
81 TABLET, FILM COATED ORAL DAILY
Qty: 30 TABLET | Refills: 0 | Status: SHIPPED | OUTPATIENT
Start: 2024-05-29 | End: 2024-06-28

## 2024-05-28 RX ORDER — POLYETHYLENE GLYCOL 3350 17 G/17G
17 POWDER, FOR SOLUTION ORAL DAILY
Qty: 10 PACKET | Refills: 0 | Status: SHIPPED | OUTPATIENT
Start: 2024-05-28 | End: 2024-06-07

## 2024-05-28 RX ORDER — ACETAMINOPHEN 500 MG
5 TABLET ORAL NIGHTLY PRN
Qty: 7 TABLET | Refills: 0 | Status: SHIPPED | OUTPATIENT
Start: 2024-05-28 | End: 2024-06-04

## 2024-05-28 RX ORDER — INSULIN GLARGINE 100 [IU]/ML
38 INJECTION, SOLUTION SUBCUTANEOUS EVERY MORNING
Qty: 11.4 ML | Refills: 0 | Status: SHIPPED | OUTPATIENT
Start: 2024-05-28 | End: 2024-06-27

## 2024-05-28 RX ORDER — ATORVASTATIN CALCIUM 80 MG/1
80 TABLET, FILM COATED ORAL NIGHTLY
Qty: 30 TABLET | Refills: 0 | Status: SHIPPED | OUTPATIENT
Start: 2024-05-28 | End: 2024-06-27

## 2024-05-28 RX ORDER — CLOPIDOGREL BISULFATE 75 MG/1
75 TABLET ORAL DAILY
Qty: 30 TABLET | Refills: 0 | Status: SHIPPED | OUTPATIENT
Start: 2024-05-29 | End: 2024-06-28

## 2024-05-28 RX ADMIN — INSULIN LISPRO 4 UNITS: 100 INJECTION, SOLUTION INTRAVENOUS; SUBCUTANEOUS at 08:28

## 2024-05-28 RX ADMIN — Medication 2000 UNITS: at 09:11

## 2024-05-28 RX ADMIN — LOSARTAN POTASSIUM 100 MG: 50 TABLET, FILM COATED ORAL at 09:10

## 2024-05-28 RX ADMIN — ICOSAPENT ETHYL 1000 MG: 1 CAPSULE ORAL at 09:10

## 2024-05-28 RX ADMIN — CLOPIDOGREL 75 MG: 75 TABLET ORAL at 09:12

## 2024-05-28 RX ADMIN — INSULIN LISPRO 4 UNITS: 100 INJECTION, SOLUTION INTRAVENOUS; SUBCUTANEOUS at 08:30

## 2024-05-28 RX ADMIN — INSULIN LISPRO 4 UNITS: 100 INJECTION, SOLUTION INTRAVENOUS; SUBCUTANEOUS at 16:00

## 2024-05-28 RX ADMIN — INSULIN LISPRO 2 UNITS: 100 INJECTION, SOLUTION INTRAVENOUS; SUBCUTANEOUS at 16:48

## 2024-05-28 RX ADMIN — GABAPENTIN 200 MG: 100 CAPSULE ORAL at 09:11

## 2024-05-28 RX ADMIN — INSULIN LISPRO 4 UNITS: 100 INJECTION, SOLUTION INTRAVENOUS; SUBCUTANEOUS at 11:40

## 2024-05-28 RX ADMIN — Medication 1 TABLET: at 09:11

## 2024-05-28 RX ADMIN — HEPARIN SODIUM 5000 UNITS: 5000 INJECTION INTRAVENOUS; SUBCUTANEOUS at 09:10

## 2024-05-28 RX ADMIN — Medication 2000 MCG: at 09:11

## 2024-05-28 RX ADMIN — POLYETHYLENE GLYCOL 3350 17 G: 17 POWDER, FOR SOLUTION ORAL at 09:10

## 2024-05-28 RX ADMIN — PANTOPRAZOLE SODIUM 40 MG: 40 TABLET, DELAYED RELEASE ORAL at 06:22

## 2024-05-28 RX ADMIN — INSULIN GLARGINE 38 UNITS: 100 INJECTION, SOLUTION SUBCUTANEOUS at 08:28

## 2024-05-28 RX ADMIN — ASPIRIN 81 MG: 81 TABLET, CHEWABLE ORAL at 09:11

## 2024-05-28 RX ADMIN — OXYBUTYNIN CHLORIDE 5 MG: 5 TABLET ORAL at 09:11

## 2024-05-28 RX ADMIN — HEPARIN SODIUM 5000 UNITS: 5000 INJECTION INTRAVENOUS; SUBCUTANEOUS at 00:13

## 2024-05-28 RX ADMIN — INSULIN LISPRO 2 UNITS: 100 INJECTION, SOLUTION INTRAVENOUS; SUBCUTANEOUS at 11:41

## 2024-05-28 ASSESSMENT — COGNITIVE AND FUNCTIONAL STATUS - GENERAL
EATING MEALS: A LITTLE
DAILY ACTIVITIY SCORE: 16
HELP NEEDED FOR BATHING: A LOT
TOILETING: A LOT
DAILY ACTIVITIY SCORE: 15
DRESSING REGULAR UPPER BODY CLOTHING: A LITTLE
PERSONAL GROOMING: A LITTLE
HELP NEEDED FOR BATHING: A LOT
DRESSING REGULAR LOWER BODY CLOTHING: A LOT
DRESSING REGULAR LOWER BODY CLOTHING: A LOT
TOILETING: A LOT
PERSONAL GROOMING: A LITTLE
DRESSING REGULAR UPPER BODY CLOTHING: A LITTLE

## 2024-05-28 ASSESSMENT — PAIN - FUNCTIONAL ASSESSMENT: PAIN_FUNCTIONAL_ASSESSMENT: 0-10

## 2024-05-28 ASSESSMENT — ACTIVITIES OF DAILY LIVING (ADL): HOME_MANAGEMENT_TIME_ENTRY: 15

## 2024-05-28 ASSESSMENT — PAIN SCALES - GENERAL: PAINLEVEL_OUTOF10: 0 - NO PAIN

## 2024-05-28 NOTE — PROGRESS NOTES
05/28/24 1018   Discharge Planning   Living Arrangements Spouse/significant other   Support Systems Spouse/significant other   Assistance Needed A&Ox3, independent with ADLs, drives, utilizes a cane or rollator, raised toilet with rails, shower chair, grab bars, room air at baseline   Type of Residence Private residence   Number of Stairs to Enter Residence 2   Number of Stairs Within Residence 12   Do you have animals or pets at home? Yes   Type of Animals or Pets 1 cat   Home or Post Acute Services Post acute facilities (Rehab/SNF/etc)   Type of Post Acute Facility Services Skilled nursing   Patient expects to be discharged to: [facility] can accept patient, but will not allow the wife to stay the night with the patient. [facility] can accept and will let the pt's wife spend the night but does not have a bed available until next week so patient would need to discharge to sister facility. Discussed with the patient and his wife at the bedside, they would like [facility] to send referral to [facility] to see if patient would have private room and if patient's wife could stay the night. Referral sent, awaiting response.   Does the patient need discharge transport arranged? Yes   RoundTrip coordination needed? Yes   Has discharge transport been arranged? No        05/28/24 1059   Discharge Planning   Patient expects to be discharged to: [facility] is able to accept pt, has a private room, and the pt's wife can stay the night. Patient and spouse updated at the bedside. They are agreeable. Plan for dc today 5/28

## 2024-05-28 NOTE — DISCHARGE SUMMARY
Discharge Diagnosis  CVA  Generalized weakness  Mild cognitive impairment  HTN  Constipation    Issues Requiring Follow-Up  Followup with the neurology after discharge from the hospital.  Followup with PCP following discharge from hospital.  Recommend cardiac monitoring outpatient such as Holter for arrhythmia monitoring such as for Afib. Likely will need to go through insurance for approval.    Discharge Meds     Your medication list        START taking these medications        Instructions Last Dose Given Next Dose Due   aspirin 81 mg chewable tablet  Start taking on: May 29, 2024      Chew 1 tablet (81 mg) once daily.       atorvastatin 80 mg tablet  Commonly known as: Lipitor      Take 1 tablet (80 mg) by mouth once daily at bedtime.       clopidogrel 75 mg tablet  Commonly known as: Plavix  Start taking on: May 29, 2024      Take 1 tablet (75 mg) by mouth once daily.       Lantus Solostar U-100 Insulin 100 unit/mL (3 mL) pen  Generic drug: insulin glargine  Replaces: insulin glargine 100 unit/mL (3 mL) pen      Inject 38 Units under the skin once daily in the morning. Take as directed per insulin instructions.       melatonin 5 mg tablet      Take 1 tablet (5 mg) by mouth as needed at bedtime for sleep for up to 7 days.       polyethylene glycol 17 gram packet  Commonly known as: Glycolax, Miralax      Take 17 g by mouth once daily for 10 doses.              CHANGE how you take these medications        Instructions Last Dose Given Next Dose Due   losartan 100 mg tablet  Commonly known as: Cozaar  Start taking on: May 29, 2024  What changed:   medication strength  how much to take      Take 1 tablet (100 mg) by mouth once daily.       mirabegron 25 mg tablet extended release 24 hr 24 hr tablet  Commonly known as: Myrbetriq  What changed: when to take this      Take 1 tablet (25 mg) by mouth once daily. [id number]  12/2025       omeprazole 20 mg DR capsule  Commonly known as: PriLOSEC  What changed: when to take  "this      TAKE 1 CAPSULE BY MOUTH EVERY DAY              CONTINUE taking these medications        Instructions Last Dose Given Next Dose Due   acetaminophen 325 mg tablet  Commonly known as: Tylenol           cholecalciferol 50 mcg (2,000 unit) capsule  Commonly known as: Vitamin D-3           Contour Test Strips strip  Generic drug: blood sugar diagnostic           True Metrix Glucose Test Strip strip  Generic drug: blood sugar diagnostic           cyanocobalamin (vitamin B-12) 1,000 mcg tablet extended release  Commonly known as: Vitamin B-12           fluticasone 50 mcg/actuation nasal spray  Commonly known as: Flonase           gabapentin 100 mg capsule  Commonly known as: Neurontin      TAKE TWO CAPSULES BY MOUTH TWICE DAILY       HumaLOG KwikPen Insulin 100 unit/mL injection  Generic drug: insulin lispro      INJECT 3 to 7 UNITS 3 times daily       lancets 26 gauge misc           multivitamin tablet           omega 3-dha-epa-fish oil 1,200 (144-216) mg capsule           Sure Comfort Pen Needle 32 gauge x 5/32\" needle  Generic drug: pen needle, diabetic      Take as directed. Use daily for lantus and 3times a day for humalog coverage       tamsulosin 0.4 mg 24 hr capsule  Commonly known as: Flomax      Take 1 capsule (0.4 mg) by mouth once daily at bedtime.       triamcinolone 0.1 % cream  Commonly known as: Kenalog      Apply topically 2 times a day. Apply to affected area 1-2 times daily as needed. Avoid face and groin.              STOP taking these medications      insulin glargine 100 unit/mL (3 mL) pen  Commonly known as: Lantus Solostar U-100 Insulin  Replaced by: Lantus Solostar U-100 Insulin 100 unit/mL (3 mL) pen        QuickVue At-Home COVID-19 Test kit  Generic drug: COVID-19 antigen test        simvastatin 20 mg tablet  Commonly known as: Zocor                  Where to Get Your Medications        These medications were sent to [facility] - [place], [place] - [address]  [address], " [place] [address]      Phone: [phone]   aspirin 81 mg chewable tablet  atorvastatin 80 mg tablet  clopidogrel 75 mg tablet  Lantus Solostar U-100 Insulin 100 unit/mL (3 mL) pen  losartan 100 mg tablet  melatonin 5 mg tablet  polyethylene glycol 17 gram packet         Test Results Pending At Discharge  Pending Labs       Order Current Status    Acetylcholine receptor, binding In process    Acetylcholine receptor, blocking In process    Acetylcholine receptor, modulating In process            Hospital Course  84-year-old male presented to hospital for right-sided weakness that resolved upon arrival to the ED, concern for TIA, admitted for stroke workup.  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 echocardiogram performed.  PT/OT consulted. Pt started on DAPT, increased statin dose, and neuro checks during his stay.  Patient had no return of his initial right-sided weakness.  After permissive hypertension window, patient remained hypertensive on his home blood pressure medication, increased his home losartan dose.  He had an episode of hospital delirium, got up in the middle of the night and had a fall, needed to be placed in restraints and redirected.  CT head after fall was negative.  Patient back to baseline mentation in the morning, provide melatonin at nighttime, patient did not have a repeat episode of delirium.  PT and OT suggested discharge to SNF, patient and patient's wife are agreeable to discharge to SNF with follow-up with PCP and neurology.  Patient discharged in stable condition.    Pertinent Physical Exam At Time of Discharge  Physical Exam  Constitutional: patient was seen and examined at the bedside and appeared calm  Eyes: PERRL bilaterally   Head: atraumatic, normocephalic  Heart: RRR, no M/R/G  Lungs: CTA, b/l, no W/R/R  Abdomen: soft, ND, NT, + BS in all 4 quadrants   Extremities: no edema,  ecchymoses, erythema   Neuro: AAOx4 to person/place/time/situation  Psych: appropriate mood and behavior     Outpatient Follow-Up  Future Appointments   Date Time Provider Department Center   5/30/2024 11:00 AM GEA CT 1 [facility]    6/26/2024  2:00 PM Margie Solitario MD [id number] [facility]   8/29/2024 11:40 AM Juan C Denney MD [id number] [facility]   11/14/2024 12:20 PM Yarely Schroeder DO [id number] [facility]         Heather Cramer DO

## 2024-05-28 NOTE — CARE PLAN
The patient's goals for the shift include  sit in chair    The clinical goals for the shift include pt will have unchanged neuro checks during shift      Problem: Safety - Adult  Goal: Free from fall injury  Outcome: Progressing     Problem: Fall/Injury  Goal: Verbalize understanding of personal risk factors for fall in the hospital  Outcome: Progressing     Problem: Diabetes  Goal: Achieve decreasing blood glucose levels by end of shift  Outcome: Progressing     Problem: General Stroke  Goal: Establish a mutual long term goal with patient by discharge  Outcome: Progressing     Problem: General Stroke  Goal: Demonstrate improvement in neurological exam throughout the shift  Outcome: Progressing     Problem: General Stroke  Goal: Controlled blood glucose throughout shift  Outcome: Progressing

## 2024-05-28 NOTE — PROGRESS NOTES
Occupational Therapy    Occupational Therapy Treatment    Name: Kevin Donato  MRN: [id number]  : 1940  Date: 24  Time Calculation  Start Time: 1030  Stop Time: 1055  Time Calculation (min): 25 min    Assessment:  OT Assessment: Pt making good progress towards OT goals as evidenced by improved balance and decreased neglect symptoms. Continued skilled OT recommende to maximize pt independence and safety  Prognosis: Good  Barriers to Discharge: None  Evaluation/Treatment Tolerance: Patient tolerated treatment well  Medical Staff Made Aware: Yes  End of Session Communication: Bedside nurse  End of Session Patient Position: Up in chair, Alarm on  Plan:  Treatment Interventions: ADL retraining, Functional transfer training, Endurance training, Compensatory technique education, Neuromuscular reeducation  OT Frequency: 4 times per week  OT Discharge Recommendations: High intensity level of continued care  Equipment Recommended upon Discharge: Wheeled walker  OT Recommended Transfer Status: Assist of 2  OT - OK to Discharge: Yes (per OT POC)    Subjective   Previous Visit Info:  OT Last Visit  OT Received On: 24  General:  General  Reason for Referral: 85 yo male referred to OT for TIA, impaired ADL, impaired mobility  Referred By: Melany Xiao DO  Past Medical History Relevant to Rehab: T2DM, CKD 3, BPH, HTN, HLD  Family/Caregiver Present: Yes  Caregiver Feedback: spouse present, able to participate in session  Prior to Session Communication: Bedside nurse  Patient Position Received: Bed, 3 rail up, Alarm on  General Comment: Pt pleasant, eager for therapy participation. External catheter in place  Precautions:  Medical Precautions: Fall precautions     Pain Assessment:  Pain Assessment  Pain Assessment: 0-10  Pain Score: 0 - No pain     Objective   Cognition:  Overall Cognitive Status: Impaired  Orientation Level: Disoriented X4  Safety/Judgement: Exceptions to WFL  Insight: Moderate  Impulsive:  Moderately  Activities of Daily Living:    Toileting  Toileting Level of Assistance: Minimum assistance  Where Assessed: Toilet  Toileting Comments: Assist for balance, clothing management    Functional Standing Tolerance:  Functional Standing Tolerance  Time: ~8 minutes  Activity: ADL, transfer training  Functional Standing Tolerance Comments: CGA-Min A with WW, cues to attend to R side and to maintain balance  Bed Mobility/Transfers: Bed Mobility  Bed Mobility: Yes  Bed Mobility 1  Bed Mobility 1: Supine to sitting  Level of Assistance 1: Contact guard  Bed Mobility Comments 1: HOB elevated with use of bed rail, increased time/effort    Transfers  Transfer: Yes  Transfer 1  Transfer From 1: Sit to  Transfer to 1: Stand  Technique 1: Sit to stand, Stand to sit  Transfer Device 1: Walker  Transfer Level of Assistance 1: Contact guard    Toilet Transfers  Toilet Transfer From: Rolling walker  Toilet Transfer Type: To and from  Toilet Transfer to: Standard toilet  Toilet Transfer Technique: Stand pivot  Toilet Transfers: Minimal assistance  Toilet Transfers Comments: Assist for problem solving, safety, balance. Cues for walker management, squaring up to sitting surface using WW    Functional Mobility:  Functional Mobility  Functional Mobility Performed: Yes  Functional Mobility 1  Surface 1: Level tile  Device 1: Rolling walker  Assistance 1: Minimum assistance  Comments 1: Ambulated household distance from bed>bathroom>hallway with min assist for upright balance and safety. Pt with R side neglect, cues needed toattend to balance and R side. Cues for safety/impulsivity.  Sitting Balance:  Static Sitting Balance  Static Sitting-Balance Support: Feet supported  Static Sitting-Level of Assistance: Contact guard  Static Sitting-Comment/Number of Minutes: ~10 minutes total, cues for balance  Standing Balance:  Static Standing Balance  Static Standing-Balance Support: Bilateral upper extremity supported  Static  Standing-Level of Assistance: Contact guard    Outcome Measures:  [facility] Daily Activity  Putting on and taking off regular lower body clothing: A lot  Bathing (including washing, rinsing, drying): A lot  Putting on and taking off regular upper body clothing: A little  Toileting, which includes using toilet, bedpan or urinal: A lot  Taking care of personal grooming such as brushing teeth: A little  Eating Meals: None  Daily Activity - Total Score: 16        Education Documentation  Body Mechanics, taught by Samuel Lorenzo OT at 5/28/2024 11:21 AM.  Learner: Family, Patient  Readiness: Acceptance  Method: Explanation  Response: Verbalizes Understanding    Precautions, taught by Samuel Lorenzo OT at 5/28/2024 11:21 AM.  Learner: Family, Patient  Readiness: Acceptance  Method: Explanation  Response: Verbalizes Understanding    ADL Training, taught by Samuel Lorenzo OT at 5/28/2024 11:21 AM.  Learner: Family, Patient  Readiness: Acceptance  Method: Explanation  Response: Verbalizes Understanding    Education Comments  No comments found.      Goals:  Encounter Problems       Encounter Problems (Active)       OT Goals       Pt will increase static/dynamic stand to Good to increase safety and independence with functional task completion.   (Progressing)       Start:  05/24/24    Expected End:  06/07/24            Pt will tolerate 10min stand during functional task completion with no more than 1 rest break in order to increase endurance for functional task completion.  (Progressing)       Start:  05/24/24    Expected End:  06/07/24            Pt will demo LE ADL completion with modified independence, using AE if needed.  (Progressing)       Start:  05/24/24    Expected End:  06/07/24            Pt will complete [id number] transfers using LRD in preparation for ADLs with supervision  (Progressing)       Start:  05/24/24    Expected End:  06/07/24            Pt will increase endurance to tolerate 15min of OOB  activity with no more than 1 rest break in order to increase ability to engage in ADL completion.  (Progressing)       Start:  05/24/24    Expected End:  06/07/24

## 2024-05-28 NOTE — DISCHARGE INSTRUCTIONS
Your weakness which is improving, was due to stroke. You are now stable enough to be discharged home.    The following recommendations are made for you at time of hospital discharge:  - Please take your home medications as instructed prior to hospitalization.   - The following changes to your home medications have been made during your hospital stay:     - stop simvastatin   - Take losartan 100 mg daily  - Take aspirin 81 chewable by mouth daily  - Take atorvastatin 80 mg daily  - Take Plavix 75 mg daily  - Continue Lantus 38 units under the skin in the morning  - Take melatonin 5 mg as needed for sleep  - Take MiraLAX 17 g packet fluid of your choice, daily as needed for constipation.    - Please follow-up with your primary care provider within 5-7 days from time of discharge. Please call your PCP's office to schedule an appointment. Likely will need to bring photo ID and insurance card to your appointment.   -Please follow-up with neurology in 3 weeks.  -   services has been requested to make an appointment for you however if you do not hear back from them within 2-3 days, please call [phone] to find out about appointments with  services.  - If you experience any worsening symptoms or any new acute concerns arise, please contact your primary care provider to discuss and possibly arrange an appointment. If you cannot get in touch with provider or severe symptoms are present, please return to nearest emergency room/urgent care for evaluation and treatment.

## 2024-05-29 LAB — ACHR BLOCK AB/ACHR TOTAL SFR SER: 0 % (ref 0–26)

## 2024-05-30 ENCOUNTER — NURSING HOME VISIT (OUTPATIENT)
Dept: POST ACUTE CARE | Facility: EXTERNAL LOCATION | Age: 84
End: 2024-05-30
Payer: MEDICARE

## 2024-05-30 ENCOUNTER — APPOINTMENT (OUTPATIENT)
Dept: RADIOLOGY | Facility: HOSPITAL | Age: 84
End: 2024-05-30
Payer: MEDICARE

## 2024-05-30 DIAGNOSIS — I63.9 CEREBROVASCULAR ACCIDENT (CVA), UNSPECIFIED MECHANISM (MULTI): ICD-10-CM

## 2024-05-30 DIAGNOSIS — E11.40 TYPE 2 DIABETES MELLITUS WITH DIABETIC NEUROPATHY, WITH LONG-TERM CURRENT USE OF INSULIN (MULTI): ICD-10-CM

## 2024-05-30 DIAGNOSIS — Z79.4 TYPE 2 DIABETES MELLITUS WITH DIABETIC NEUROPATHY, WITH LONG-TERM CURRENT USE OF INSULIN (MULTI): ICD-10-CM

## 2024-05-30 LAB — ACHR BIND AB SER-SCNC: 0 NMOL/L (ref 0–0.4)

## 2024-05-30 PROCEDURE — 99305 1ST NF CARE MODERATE MDM 35: CPT | Performed by: FAMILY MEDICINE

## 2024-05-30 NOTE — LETTER
Patient: Kevin Donato  : 1940    Encounter Date: 2024    Kevin Donato is a 84 y.o. 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 Weakness resolved but complicated by delirium. Wife at bedside provides history. No CP/SOB. Med list & problem list reviewed.    O: VSS AFEB 198# Awake, alert, pleasantly confused. Chest cta. Heart rrr. Ext no c/c/e.    LAB (24) Na 140, K 4.2, Cr 1.54, GFR 44, Alb 3.8    A/P:  # Weakness following subacute CVA: SNF PT/OT  # Subacute/chronic CVA Right PICA: statin (home sivmastatin changed to max dose ATORVASTATIN) d/t vetebral artery occlusion, asa, plavix  # MCI w/ delirium: ST  # FEN: B12, MVIT, Omega 3  # HTN: Losartan 100  # OAB: myrbetriq  # GERD: PPI  # BPH w LUTS: Flomax  # DM w neuropathy: Lantus 38, SSI, gabapentin  # Allergic rhinitis: flonase      Past Surgical History:   Procedure Laterality Date   • HERNIA REPAIR  2017    Hernia Repair   • NOSE SURGERY  2017    Nose Surgery   • OTHER SURGICAL HISTORY  2022    Cholecystectomy      Social History     Socioeconomic History   • Marital status:      Spouse name: Not on file   • Number of children: Not on file   • Years of education: Not on file   • Highest education level: Not on file   Occupational History   • Not on file   Tobacco Use   • Smoking status: Never   • Smokeless tobacco: Never   Substance and Sexual Activity   • Alcohol use: Never   • Drug use: Never   • Sexual activity: Not on file   Other Topics Concern   • Not on file   Social History Narrative   • Not on file     Social Determinants of Health     Financial  Resource Strain: Low Risk  (5/24/2024)    Overall Financial Resource Strain (CARDIA)    • Difficulty of Paying Living Expenses: Not hard at all   Food Insecurity: No Food Insecurity (5/23/2024)    Hunger Vital Sign    • Worried About Running Out of Food in the Last Year: Never true    • Ran Out of Food in the Last Year: Never true   Transportation Needs: No Transportation Needs (5/24/2024)    PRAPARE - Transportation    • Lack of Transportation (Medical): No    • Lack of Transportation (Non-Medical): No   Physical Activity: Inactive (5/23/2024)    Exercise Vital Sign    • Days of Exercise per Week: 0 days    • Minutes of Exercise per Session: 0 min   Stress: No Stress Concern Present (5/23/2024)    [employer] - Occupational Stress Questionnaire    • Feeling of Stress : Not at all   Social Connections: Moderately Isolated (5/23/2024)    Social Connection and Isolation Panel [NHANES]    • Frequency of Communication with Friends and Family: More than three times a week    • Frequency of Social Gatherings with Friends and Family: Three times a week    • Attends Yazdanism Services: Never    • Active Member of Clubs or Organizations: No    • Attends Club or Organization Meetings: Never    • Marital Status:    Intimate Partner Violence: Not At Risk (5/23/2024)    Humiliation, Afraid, Rape, and Kick questionnaire    • Fear of Current or Ex-Partner: No    • Emotionally Abused: No    • Physically Abused: No    • Sexually Abused: No   Housing Stability: Low Risk  (5/24/2024)    Housing Stability Vital Sign    • Unable to Pay for Housing in the Last Year: No    • Number of Places Lived in the Last Year: 1    • Unstable Housing in the Last Year: No     Past Medical History:   Diagnosis Date   • Calculus of bile duct without cholangitis or cholecystitis without obstruction 02/21/2022    Choledocholithiasis   • Fever, unspecified 01/10/2022    Fever and chills   • Generalized abdominal pain  01/10/2022    Generalized abdominal pain   • Hiccough 08/31/2022    Intractable hiccups   • Iron deficiency anemia secondary to blood loss (chronic) 02/28/2019    Iron deficiency anemia due to chronic blood loss   • Otalgia, right ear 04/28/2016    Right ear pain   • Other cholangitis (CMS-HCC) 02/14/2022    Ascending cholangitis   • Other iron deficiency anemias 12/16/2016    Other iron deficiency anemia   • Other skin changes 10/13/2017    Vesicular rash   • Pain in right shoulder 10/07/2016    Right shoulder pain   • Parkinson's disease (Multi) 03/23/2020    Parkinsonism   • Personal history of diseases of the skin and subcutaneous tissue 02/28/2019    History of urticaria   • Personal history of other diseases of the circulatory system     History of hypertension   • Personal history of other diseases of the digestive system 08/10/2018    History of pancreatitis   • Personal history of other diseases of the musculoskeletal system and connective tissue 02/26/2020    History of polymyalgia rheumatica   • Personal history of other diseases of the nervous system and sense organs 05/21/2018    History of hydrocephalus   • Personal history of other diseases of the nervous system and sense organs 04/07/2015    History of eustachian tube dysfunction   • Personal history of other diseases of the respiratory system 01/10/2019    History of sinusitis   • Personal history of other diseases of urinary system 11/12/2014    History of hematuria   • Personal history of other specified conditions 10/16/2017    History of edema   • Personal history of other specified conditions 01/11/2022    History of jaundice   • Type 2 diabetes mellitus without complications (Multi) 11/29/2022    Diabetes mellitus      Family History   Problem Relation Name Age of Onset   • Diabetes Mother     • Colon cancer Sister     • Leukemia Sister          Review of Systems   Constitutional:  Negative for chills, fatigue and fever.   HENT:  Negative for  rhinorrhea and sore throat.    Eyes:  Negative for pain and redness.   Respiratory:  Negative for cough and shortness of breath.    Cardiovascular:  Negative for chest pain and palpitations.   Gastrointestinal:  Negative for abdominal pain and blood in stool.   Endocrine: Negative for polydipsia and polyuria.   Genitourinary:  Negative for dysuria and hematuria.   Musculoskeletal:  Negative for back pain and neck stiffness.   Skin:  Negative for rash and wound.   Allergic/Immunologic: Negative for environmental allergies and food allergies.   Neurological:  Positive for weakness. Negative for headaches.   Hematological:  Negative for adenopathy. Does not bruise/bleed easily.   Psychiatric/Behavioral:  Positive for confusion. Negative for hallucinations and suicidal ideas.       There were no vitals taken for this visit.  Physical Exam  Vitals reviewed.   Constitutional:       General: He is not in acute distress.     Appearance: He is not ill-appearing.   HENT:      Head: Normocephalic and atraumatic.      Right Ear: Tympanic membrane normal.      Left Ear: Tympanic membrane normal.      Nose: No congestion or rhinorrhea.      Mouth/Throat:      Pharynx: No oropharyngeal exudate or posterior oropharyngeal erythema.   Eyes:      Extraocular Movements: Extraocular movements intact.      Conjunctiva/sclera: Conjunctivae normal.      Pupils: Pupils are equal, round, and reactive to light.   Cardiovascular:      Rate and Rhythm: Normal rate and regular rhythm.      Heart sounds: No murmur heard.     No friction rub. No gallop.   Pulmonary:      Effort: Pulmonary effort is normal.      Breath sounds: Normal breath sounds. No wheezing, rhonchi or rales.   Abdominal:      General: There is no distension.      Palpations: Abdomen is soft.      Tenderness: There is no abdominal tenderness. There is no guarding or rebound.   Musculoskeletal:         General: No swelling or deformity.      Cervical back: Normal range of motion  "and neck supple.      Right lower leg: No edema.      Left lower leg: No edema.   Skin:     Capillary Refill: Capillary refill takes less than 2 seconds.      Coloration: Skin is not jaundiced.      Findings: No rash.   Neurological:      General: No focal deficit present.      Mental Status: He is alert.      Motor: Weakness present.   Psychiatric:         Mood and Affect: Mood normal.         Behavior: Behavior normal.       Lab Results   Component Value Date    WBC 8.6 06/05/2024    HGB 13.2 (L) 06/05/2024    HCT 39.5 (L) 06/05/2024    MCV 98 06/05/2024     06/05/2024     Lab Results   Component Value Date    CHOL 147 05/23/2024    CHOL 150 11/29/2023    CHOL 133 05/17/2023     Lab Results   Component Value Date    HDL 40.4 05/23/2024    HDL 43.5 11/29/2023    HDL 47.2 05/17/2023     Lab Results   Component Value Date    LDLCALC 78 05/23/2024    LDLCALC 81 11/29/2023     Lab Results   Component Value Date    TRIG 143 05/23/2024    TRIG 130 11/29/2023    TRIG 100 05/17/2023     No components found for: \"CHOLHDL\"  Lab Results   Component Value Date    HGBA1C 8.1 (H) 05/23/2024       Assessment/Plan  Problem List Items Addressed This Visit       Diabetes mellitus with diabetic neuropathy (Multi)    Overview     uncontrolled         Cerebrovascular accident (CVA) (Multi)       Electronically Signed By: Eric Warner MD   6/27/24  8:33 PM  "

## 2024-05-31 ENCOUNTER — NURSING HOME VISIT (OUTPATIENT)
Dept: POST ACUTE CARE | Facility: EXTERNAL LOCATION | Age: 84
End: 2024-05-31
Payer: MEDICARE

## 2024-05-31 DIAGNOSIS — I63.211 CEREBROVASCULAR ACCIDENT (CVA) DUE TO OCCLUSION OF RIGHT VERTEBRAL ARTERY (MULTI): Primary | ICD-10-CM

## 2024-05-31 DIAGNOSIS — E78.5 DYSLIPIDEMIA: ICD-10-CM

## 2024-05-31 DIAGNOSIS — I10 BENIGN ESSENTIAL HYPERTENSION: ICD-10-CM

## 2024-05-31 DIAGNOSIS — K21.9 GASTROESOPHAGEAL REFLUX DISEASE, UNSPECIFIED WHETHER ESOPHAGITIS PRESENT: ICD-10-CM

## 2024-05-31 DIAGNOSIS — J30.9 ALLERGIC RHINITIS, UNSPECIFIED SEASONALITY, UNSPECIFIED TRIGGER: ICD-10-CM

## 2024-05-31 DIAGNOSIS — N40.0 BENIGN PROSTATIC HYPERPLASIA WITHOUT LOWER URINARY TRACT SYMPTOMS: ICD-10-CM

## 2024-05-31 DIAGNOSIS — G47.00 INSOMNIA, UNSPECIFIED TYPE: ICD-10-CM

## 2024-05-31 DIAGNOSIS — K59.00 CONSTIPATION, UNSPECIFIED CONSTIPATION TYPE: ICD-10-CM

## 2024-05-31 DIAGNOSIS — E56.9 VITAMIN DEFICIENCY: ICD-10-CM

## 2024-05-31 DIAGNOSIS — M35.3 PMR (POLYMYALGIA RHEUMATICA) (MULTI): ICD-10-CM

## 2024-05-31 DIAGNOSIS — E11.42 TYPE 2 DIABETES MELLITUS WITH DIABETIC POLYNEUROPATHY, WITH LONG-TERM CURRENT USE OF INSULIN (MULTI): ICD-10-CM

## 2024-05-31 DIAGNOSIS — N32.81 OAB (OVERACTIVE BLADDER): ICD-10-CM

## 2024-05-31 DIAGNOSIS — Z79.4 TYPE 2 DIABETES MELLITUS WITH DIABETIC POLYNEUROPATHY, WITH LONG-TERM CURRENT USE OF INSULIN (MULTI): ICD-10-CM

## 2024-05-31 PROCEDURE — 99310 SBSQ NF CARE HIGH MDM 45: CPT | Performed by: NURSE PRACTITIONER

## 2024-06-03 ENCOUNTER — NURSING HOME VISIT (OUTPATIENT)
Dept: POST ACUTE CARE | Facility: EXTERNAL LOCATION | Age: 84
End: 2024-06-03
Payer: MEDICARE

## 2024-06-03 DIAGNOSIS — N32.81 OAB (OVERACTIVE BLADDER): ICD-10-CM

## 2024-06-03 DIAGNOSIS — I10 BENIGN ESSENTIAL HYPERTENSION: ICD-10-CM

## 2024-06-03 PROCEDURE — 99309 SBSQ NF CARE MODERATE MDM 30: CPT | Performed by: FAMILY MEDICINE

## 2024-06-03 NOTE — LETTER
Patient: Kevin Donato  : 1940    Encounter Date: 2024    Resident seen 6/3/24 -- MP    CC: SNF ([facility]) Recheck    : 1940  SNF H&P done 24  DC summary dated 24 reviewed 24  Allergy: Lisinopril (ACEi)  DNR-CC    S; 83 yo man with MCI, HTN, right sided weakness d/t to subacute and chronic strokes of right PICA d/t occulsion of right vertrebral artery. Weakness resolved but complicated by delirium. Wife at bedside provides history. No CP/SOB. C/O OAB. Med list & problem list reviewed.    O: VSS AFEB 198# (Stable) Awake, alert, pleasantly confused. Chest cta. Heart rrr. Ext no c/c/e.    LAB (24) Na 140, K 4.2, Cr 1.54, GFR 44, Alb 3.8    A/P:  # Weakness following subacute CVA: SNF PT/OT  # Subacute/chronic CVA Right PICA: statin (home sivmastatin changed to max dose ATORVASTATIN) d/t vetebral artery occlusion, asa, plavix  # MCI w/ delirium: ST  # FEN: B12, MVIT, Omega 3  # HTN: Losartan 100  # OAB: increase myrbetriq to 50 mg daily.  # GERD: PPI  # BPH w LUTS: Flomax  # DM w neuropathy: Lantus 38, SSI, gabapentin  # Allergic rhinitis: flonase      Electronically Signed By: Eric Warner MD   24  8:33 PM

## 2024-06-03 NOTE — PROGRESS NOTES
*Provider Impression*    Patient is an 84 year old male who is seen today for management of multiple medical problems    #CVA / HTN / HLD - PT/OT/ST; ASA 81mg daily, atorvastatin 80mg daily, Plavix 75mg daily, losartan 100mg daily, fish oil 1200mg daily, f/u w/ neurology, f/u w/ cardiology for Holter  #PMR - acetaminophen 650mg q4h PRN, follow up with rheumatology  #T2DM w/ neuropathy - Humalog SSI, lantus 38units daily, gabapentin 200mg BID  #GERD / Constipation - omeprazole 20mg daily, miralax 17grams daily, zofran 4mg q6h PRN  #Rhinitis - flonase 50mcg daily PRN  #OAB / BPH - myrbetriq 25mg daily, flomax 0.4mg QPM  #Insomnia - melatonin 5mg QHS PRN  #Vitamin deficiency - vitamin D 2000units daily, vitamin B12 2000mcg daily, multivitamin daily  #ACP - DNRCC     Follow up as needed      *Chief Complaint*     CVA    *History of Present Illness*    Patient is an 85 y/o male w/ PMH as below who presented to hospital for right-sided weakness that resolved upon arrival to the ED, concern for TIA, admitted for stroke workup.  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 Echocardiogram performed.  PT/OT consulted. Pt started on DAPT, increased statin dose, and neuro checks during his stay.  Patient had no return of his initial right-sided weakness.  After permissive hypertension window, patient remained hypertensive on his home blood pressure medication, increased his home losartan dose.  He had an episode of hospital delirium, got up in the middle of the night and had a fall, needed to be placed in restraints and redirected.  CT head after fall was negative.  Patient was back to baseline mentation in the am, provide melatonin at nighttime, patient did not have a repeat episode of delirium.  PT and OT suggested discharge to SNF, patient and patient's wife were agreeable to discharge to SNF with follow-up with PCP and neurology.  He was  discharged to [facility] [place] [facility].    Labs appreciated as below.    He is seen sitting up in his room today visiting with his wife. He reports therapy going well, no pain, HA, dizziness, vision changes, f/c, sweats, CP, SOB, cough, n/v, constipation, diarrhea, edema, weakness, numbness, tingling, parestheasis, or any other new c/o presently.    Allergies - Lisinopril   PMH - HTN, HLD, T2DM, vitamin deficiency, GERD, acute pancreatitis, CKD, BPH, PMR, choledocholithiasis, cholangitis, anemia, Parkinson's, hydrocephalus,   PSH - hernia repair, nose surgery, cholecystectomy  FH - Mother had T2DM; Sister had colon cancer, leukemia  SocHx -  Never smoker, No EtOH    *Review of Systems*  All other systems reviewed are negative except as noted in the HPI     *Vital Signs*   Date: 5/31/24  - T: 97.9  P: 64  R: 18  BP: 126/69  SpO2: 98% on RA     *Results / Data*  CBC - Date: 5/29/24  WBC: 8.4  HGB: 13.7  HCT: 41.3  PLT: 182  ;   BMP - Date: 5/29/24  Na: 140  K: 4.2  Cl: 104  CO2: 27  BUN: 31  Cr: 1.54  Glu: 144  Ca: 8.9  ;   LFT - Date: 5/29/24  AST: 18  ALT: 10  ALP: 84  Tbili: 0.7  ;   Coags - Date:   INR:   PT:    *Physical Exam*  Gen: (+) NAD, (+) well-appearing  HEENT: (+) normocephalic, (+) MMM  Neck: (+) supple  Lungs: (+) CTAB, (-) wheezes, (-) rales, (-) rhonchi  Heart: (+) RRR, (+) S1 S2, (-) murmurs  Pulses: (+) palpable  Abd: (+) soft, (+) NT, (+) ND, (+) BS+  Ext: (-) edema, (-) deformity  MSK: (-) joint swelling  Skin: (+) warm, (+) dry, (-) rash  Neuro: (+) follows commands, (-) tremor, (+) alert

## 2024-06-05 ENCOUNTER — LAB REQUISITION (OUTPATIENT)
Dept: LAB | Facility: HOSPITAL | Age: 84
End: 2024-06-05

## 2024-06-05 ENCOUNTER — NURSING HOME VISIT (OUTPATIENT)
Dept: POST ACUTE CARE | Facility: EXTERNAL LOCATION | Age: 84
End: 2024-06-05
Payer: MEDICARE

## 2024-06-05 DIAGNOSIS — M35.3 PMR (POLYMYALGIA RHEUMATICA) (MULTI): ICD-10-CM

## 2024-06-05 DIAGNOSIS — N32.81 OAB (OVERACTIVE BLADDER): ICD-10-CM

## 2024-06-05 DIAGNOSIS — J30.9 ALLERGIC RHINITIS, UNSPECIFIED SEASONALITY, UNSPECIFIED TRIGGER: ICD-10-CM

## 2024-06-05 DIAGNOSIS — Z79.4 TYPE 2 DIABETES MELLITUS WITH DIABETIC POLYNEUROPATHY, WITH LONG-TERM CURRENT USE OF INSULIN (MULTI): ICD-10-CM

## 2024-06-05 DIAGNOSIS — I10 BENIGN ESSENTIAL HYPERTENSION: ICD-10-CM

## 2024-06-05 DIAGNOSIS — K59.00 CONSTIPATION, UNSPECIFIED CONSTIPATION TYPE: ICD-10-CM

## 2024-06-05 DIAGNOSIS — I63.211 CEREBROVASCULAR ACCIDENT (CVA) DUE TO OCCLUSION OF RIGHT VERTEBRAL ARTERY (MULTI): Primary | ICD-10-CM

## 2024-06-05 DIAGNOSIS — N40.0 BENIGN PROSTATIC HYPERPLASIA WITHOUT LOWER URINARY TRACT SYMPTOMS: ICD-10-CM

## 2024-06-05 DIAGNOSIS — G47.00 INSOMNIA, UNSPECIFIED TYPE: ICD-10-CM

## 2024-06-05 DIAGNOSIS — E11.42 TYPE 2 DIABETES MELLITUS WITH DIABETIC POLYNEUROPATHY, WITH LONG-TERM CURRENT USE OF INSULIN (MULTI): ICD-10-CM

## 2024-06-05 DIAGNOSIS — K21.9 GASTROESOPHAGEAL REFLUX DISEASE, UNSPECIFIED WHETHER ESOPHAGITIS PRESENT: ICD-10-CM

## 2024-06-05 DIAGNOSIS — E56.9 VITAMIN DEFICIENCY: ICD-10-CM

## 2024-06-05 DIAGNOSIS — E78.5 DYSLIPIDEMIA: ICD-10-CM

## 2024-06-05 DIAGNOSIS — I10 ESSENTIAL (PRIMARY) HYPERTENSION: ICD-10-CM

## 2024-06-05 PROBLEM — I63.9 CEREBROVASCULAR ACCIDENT (CVA) (MULTI): Status: ACTIVE | Noted: 2024-06-05

## 2024-06-05 LAB
ALBUMIN SERPL BCP-MCNC: 3.8 G/DL (ref 3.4–5)
ANION GAP SERPL CALC-SCNC: 11 MMOL/L (ref 10–20)
BUN SERPL-MCNC: 44 MG/DL (ref 6–23)
CALCIUM SERPL-MCNC: 9.4 MG/DL (ref 8.6–10.3)
CHLORIDE SERPL-SCNC: 104 MMOL/L (ref 98–107)
CO2 SERPL-SCNC: 27 MMOL/L (ref 21–32)
CREAT SERPL-MCNC: 1.81 MG/DL (ref 0.5–1.3)
EGFRCR SERPLBLD CKD-EPI 2021: 36 ML/MIN/1.73M*2
ERYTHROCYTE [DISTWIDTH] IN BLOOD BY AUTOMATED COUNT: 12.4 % (ref 11.5–14.5)
GLUCOSE SERPL-MCNC: 210 MG/DL (ref 74–99)
HCT VFR BLD AUTO: 39.5 % (ref 41–52)
HGB BLD-MCNC: 13.2 G/DL (ref 13.5–17.5)
MAGNESIUM SERPL-MCNC: 2.2 MG/DL (ref 1.6–2.4)
MCH RBC QN AUTO: 32.8 PG (ref 26–34)
MCHC RBC AUTO-ENTMCNC: 33.4 G/DL (ref 32–36)
MCV RBC AUTO: 98 FL (ref 80–100)
NRBC BLD-RTO: 0 /100 WBCS (ref 0–0)
PHOSPHATE SERPL-MCNC: 3.6 MG/DL (ref 2.5–4.9)
PLATELET # BLD AUTO: 187 X10*3/UL (ref 150–450)
POTASSIUM SERPL-SCNC: 4.9 MMOL/L (ref 3.5–5.3)
RBC # BLD AUTO: 4.02 X10*6/UL (ref 4.5–5.9)
SODIUM SERPL-SCNC: 137 MMOL/L (ref 136–145)
WBC # BLD AUTO: 8.6 X10*3/UL (ref 4.4–11.3)

## 2024-06-05 PROCEDURE — 82565 ASSAY OF CREATININE: CPT | Mod: OUT | Performed by: NURSE PRACTITIONER

## 2024-06-05 PROCEDURE — 83735 ASSAY OF MAGNESIUM: CPT | Mod: OUT | Performed by: NURSE PRACTITIONER

## 2024-06-05 PROCEDURE — 36415 COLL VENOUS BLD VENIPUNCTURE: CPT | Performed by: NURSE PRACTITIONER

## 2024-06-05 PROCEDURE — 85027 COMPLETE CBC AUTOMATED: CPT | Mod: OUT | Performed by: NURSE PRACTITIONER

## 2024-06-05 PROCEDURE — 99309 SBSQ NF CARE MODERATE MDM 30: CPT | Performed by: NURSE PRACTITIONER

## 2024-06-05 NOTE — PROGRESS NOTES
Kevin Donato is a 84 y.o. 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 Weakness resolved but complicated by delirium. Wife at bedside provides history. No CP/SOB. Med list & problem list reviewed.    O: VSS AFEB 198# Awake, alert, pleasantly confused. Chest cta. Heart rrr. Ext no c/c/e.    LAB (24) Na 140, K 4.2, Cr 1.54, GFR 44, Alb 3.8    A/P:  # Weakness following subacute CVA: SNF PT/OT  # Subacute/chronic CVA Right PICA: statin (home sivmastatin changed to max dose ATORVASTATIN) d/t vetebral artery occlusion, asa, plavix  # MCI w/ delirium: ST  # FEN: B12, MVIT, Omega 3  # HTN: Losartan 100  # OAB: myrbetriq  # GERD: PPI  # BPH w LUTS: Flomax  # DM w neuropathy: Lantus 38, SSI, gabapentin  # Allergic rhinitis: flonase      Past Surgical History:   Procedure Laterality Date    HERNIA REPAIR  2017    Hernia Repair    NOSE SURGERY  2017    Nose Surgery    OTHER SURGICAL HISTORY  2022    Cholecystectomy      Social History     Socioeconomic History    Marital status:      Spouse name: Not on file    Number of children: Not on file    Years of education: Not on file    Highest education level: Not on file   Occupational History    Not on file   Tobacco Use    Smoking status: Never    Smokeless tobacco: Never   Substance and Sexual Activity    Alcohol use: Never    Drug use: Never    Sexual activity: Not on file   Other Topics Concern    Not on file   Social History Narrative    Not on file     Social Determinants of Health     Financial Resource Strain: Low Risk  (2024)    Overall Financial Resource Strain (CARDIA)      Difficulty of Paying Living Expenses: Not hard at all   Food Insecurity: No Food Insecurity (5/23/2024)    Hunger Vital Sign     Worried About Running Out of Food in the Last Year: Never true     Ran Out of Food in the Last Year: Never true   Transportation Needs: No Transportation Needs (5/24/2024)    PRAPARE - Transportation     Lack of Transportation (Medical): No     Lack of Transportation (Non-Medical): No   Physical Activity: Inactive (5/23/2024)    Exercise Vital Sign     Days of Exercise per Week: 0 days     Minutes of Exercise per Session: 0 min   Stress: No Stress Concern Present (5/23/2024)    [employer] - Occupational Stress Questionnaire     Feeling of Stress : Not at all   Social Connections: Moderately Isolated (5/23/2024)    Social Connection and Isolation Panel [NHANES]     Frequency of Communication with Friends and Family: More than three times a week     Frequency of Social Gatherings with Friends and Family: Three times a week     Attends Christianity Services: Never     Active Member of Clubs or Organizations: No     Attends Club or Organization Meetings: Never     Marital Status:    Intimate Partner Violence: Not At Risk (5/23/2024)    Humiliation, Afraid, Rape, and Kick questionnaire     Fear of Current or Ex-Partner: No     Emotionally Abused: No     Physically Abused: No     Sexually Abused: No   Housing Stability: Low Risk  (5/24/2024)    Housing Stability Vital Sign     Unable to Pay for Housing in the Last Year: No     Number of Places Lived in the Last Year: 1     Unstable Housing in the Last Year: No     Past Medical History:   Diagnosis Date    Calculus of bile duct without cholangitis or cholecystitis without obstruction 02/21/2022    Choledocholithiasis    Fever, unspecified 01/10/2022    Fever and chills    Generalized abdominal pain 01/10/2022    Generalized abdominal pain    Hiccough 08/31/2022    Intractable hiccups    Iron deficiency anemia  secondary to blood loss (chronic) 02/28/2019    Iron deficiency anemia due to chronic blood loss    Otalgia, right ear 04/28/2016    Right ear pain    Other cholangitis (CMS-HCC) 02/14/2022    Ascending cholangitis    Other iron deficiency anemias 12/16/2016    Other iron deficiency anemia    Other skin changes 10/13/2017    Vesicular rash    Pain in right shoulder 10/07/2016    Right shoulder pain    Parkinson's disease (Multi) 03/23/2020    Parkinsonism    Personal history of diseases of the skin and subcutaneous tissue 02/28/2019    History of urticaria    Personal history of other diseases of the circulatory system     History of hypertension    Personal history of other diseases of the digestive system 08/10/2018    History of pancreatitis    Personal history of other diseases of the musculoskeletal system and connective tissue 02/26/2020    History of polymyalgia rheumatica    Personal history of other diseases of the nervous system and sense organs 05/21/2018    History of hydrocephalus    Personal history of other diseases of the nervous system and sense organs 04/07/2015    History of eustachian tube dysfunction    Personal history of other diseases of the respiratory system 01/10/2019    History of sinusitis    Personal history of other diseases of urinary system 11/12/2014    History of hematuria    Personal history of other specified conditions 10/16/2017    History of edema    Personal history of other specified conditions 01/11/2022    History of jaundice    Type 2 diabetes mellitus without complications (Multi) 11/29/2022    Diabetes mellitus      Family History   Problem Relation Name Age of Onset    Diabetes Mother      Colon cancer Sister      Leukemia Sister          Review of Systems   Constitutional:  Negative for chills, fatigue and fever.   HENT:  Negative for rhinorrhea and sore throat.    Eyes:  Negative for pain and redness.   Respiratory:  Negative for cough and shortness of breath.     Cardiovascular:  Negative for chest pain and palpitations.   Gastrointestinal:  Negative for abdominal pain and blood in stool.   Endocrine: Negative for polydipsia and polyuria.   Genitourinary:  Negative for dysuria and hematuria.   Musculoskeletal:  Negative for back pain and neck stiffness.   Skin:  Negative for rash and wound.   Allergic/Immunologic: Negative for environmental allergies and food allergies.   Neurological:  Positive for weakness. Negative for headaches.   Hematological:  Negative for adenopathy. Does not bruise/bleed easily.   Psychiatric/Behavioral:  Positive for confusion. Negative for hallucinations and suicidal ideas.       There were no vitals taken for this visit.  Physical Exam  Vitals reviewed.   Constitutional:       General: He is not in acute distress.     Appearance: He is not ill-appearing.   HENT:      Head: Normocephalic and atraumatic.      Right Ear: Tympanic membrane normal.      Left Ear: Tympanic membrane normal.      Nose: No congestion or rhinorrhea.      Mouth/Throat:      Pharynx: No oropharyngeal exudate or posterior oropharyngeal erythema.   Eyes:      Extraocular Movements: Extraocular movements intact.      Conjunctiva/sclera: Conjunctivae normal.      Pupils: Pupils are equal, round, and reactive to light.   Cardiovascular:      Rate and Rhythm: Normal rate and regular rhythm.      Heart sounds: No murmur heard.     No friction rub. No gallop.   Pulmonary:      Effort: Pulmonary effort is normal.      Breath sounds: Normal breath sounds. No wheezing, rhonchi or rales.   Abdominal:      General: There is no distension.      Palpations: Abdomen is soft.      Tenderness: There is no abdominal tenderness. There is no guarding or rebound.   Musculoskeletal:         General: No swelling or deformity.      Cervical back: Normal range of motion and neck supple.      Right lower leg: No edema.      Left lower leg: No edema.   Skin:     Capillary Refill: Capillary refill  No "takes less than 2 seconds.      Coloration: Skin is not jaundiced.      Findings: No rash.   Neurological:      General: No focal deficit present.      Mental Status: He is alert.      Motor: Weakness present.   Psychiatric:         Mood and Affect: Mood normal.         Behavior: Behavior normal.       Lab Results   Component Value Date    WBC 8.6 06/05/2024    HGB 13.2 (L) 06/05/2024    HCT 39.5 (L) 06/05/2024    MCV 98 06/05/2024     06/05/2024     Lab Results   Component Value Date    CHOL 147 05/23/2024    CHOL 150 11/29/2023    CHOL 133 05/17/2023     Lab Results   Component Value Date    HDL 40.4 05/23/2024    HDL 43.5 11/29/2023    HDL 47.2 05/17/2023     Lab Results   Component Value Date    LDLCALC 78 05/23/2024    LDLCALC 81 11/29/2023     Lab Results   Component Value Date    TRIG 143 05/23/2024    TRIG 130 11/29/2023    TRIG 100 05/17/2023     No components found for: \"CHOLHDL\"  Lab Results   Component Value Date    HGBA1C 8.1 (H) 05/23/2024       Assessment/Plan   Problem List Items Addressed This Visit       Diabetes mellitus with diabetic neuropathy (Multi)    Overview     uncontrolled         Cerebrovascular accident (CVA) (Multi)       " No

## 2024-06-10 ENCOUNTER — NURSING HOME VISIT (OUTPATIENT)
Dept: POST ACUTE CARE | Facility: EXTERNAL LOCATION | Age: 84
End: 2024-06-10
Payer: MEDICARE

## 2024-06-10 DIAGNOSIS — I10 BENIGN ESSENTIAL HYPERTENSION: ICD-10-CM

## 2024-06-10 DIAGNOSIS — N32.81 OAB (OVERACTIVE BLADDER): ICD-10-CM

## 2024-06-10 PROCEDURE — 99308 SBSQ NF CARE LOW MDM 20: CPT | Performed by: FAMILY MEDICINE

## 2024-06-10 NOTE — PROGRESS NOTES
*Provider Impression*    Patient is an 84 year old male who is seen today for management of multiple medical problems    #CVA / HTN / HLD - PT/OT/ST; ASA 81mg daily, atorvastatin 80mg daily, Plavix 75mg daily, losartan 100mg daily, fish oil 1200mg daily, f/u w/ neurology, f/u w/ cardiology for Holter  #PMR - acetaminophen 650mg q4h PRN  #T2DM w/ neuropathy - Humalog SSI, lantus 38units daily, gabapentin 200mg BID  #GERD / Constipation - omeprazole 20mg daily, miralax 17grams daily, zofran 4mg q6h PRN  #Rhinitis - flonase 50mcg daily PRN  #OAB / BPH - myrbetriq 50mg daily, d/c flomax  #Insomnia - melatonin 5mg QHS PRN  #Vitamin deficiency - vitamin D 2000units daily, vitamin B12 2000mcg daily, multivitamin daily  #ACP - DNRCC     Follow up as needed      *Chief Complaint*     CVA    *History of Present Illness*    Patient is an 85 y/o male w/ PMH as below who presented to hospital for right-sided weakness that resolved upon arrival to the ED, concern for TIA, admitted for stroke workup.  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 Echocardiogram performed.  PT/OT consulted. Pt started on DAPT, increased statin dose, and neuro checks during his stay.  Patient had no return of his initial right-sided weakness.  After permissive hypertension window, patient remained hypertensive on his home blood pressure medication, increased his home losartan dose.  He had an episode of hospital delirium, got up in the middle of the night and had a fall, needed to be placed in restraints and redirected.  CT head after fall was negative.  Patient was back to baseline mentation in the am, provide melatonin at nighttime, patient did not have a repeat episode of delirium.  PT and OT suggested discharge to SNF, patient and patient's wife were agreeable to discharge to SNF with follow-up with PCP and neurology.  He was discharged to [facility].    His  labs appreciated as below. Myrbetriq increased. He had a fall, lowered to the floor.    He is seen sitting up in his room today and denies any dizziness, CP, SOB, cough, n/v, f/c, sweats, cosntipation, diarrhea, had some heartburn, no LUTS, no numbness, tingling, paresthesais, or any other new c/o presently.    Allergies - Lisinopril   PMH - HTN, HLD, T2DM, vitamin deficiency, GERD, acute pancreatitis, CKD, BPH, PMR, choledocholithiasis, cholangitis, anemia, Parkinson's, hydrocephalus,   PSH - hernia repair, nose surgery, cholecystectomy  FH - Mother had T2DM; Sister had colon cancer, leukemia  SocHx -  Never smoker, No EtOH    *Review of Systems*  All other systems reviewed are negative except as noted in the HPI     *Vital Signs*   Date: 6/5/24  - T: 97.6  P: 77  R: 18  BP: 84/49  SpO2: 96% on RA     *Results / Data*  CBC - Date: 6/5/24  WBC: 8.6  HGB: 13.2  HCT: 39.5  PLT: 187  ;   BMP - Date: 6/5/24  Na: 137  K: 4.9  Cl: 104  CO2: 27  BUN: 44  Cr: 1.81  Glu: 210  Ca: 9.4 Phos: 3.6  Alb: 3.8  ;   LFT - Date: 5/29/24  AST: 18  ALT: 10  ALP: 84  Tbili: 0.7  ;   Coags - Date:   INR:   PT:    *Physical Exam*  Gen: (+) NAD, (+) well-appearing  HEENT: (+) normocephalic, (+) MMM  Neck: (+) supple  Lungs: (+) CTAB, (-) wheezes, (-) rales, (-) rhonchi  Heart: (+) RRR, (+) S1 S2, (-) murmurs  Pulses: (+) palpable  Abd: (+) soft, (+) NT, (+) ND, (+) BS+  Ext: (-) edema, (-) deformity  MSK: (-) joint swelling  Skin: (+) warm, (+) dry, (-) rash  Neuro: (+) follows commands, (-) tremor, (+) alert

## 2024-06-10 NOTE — LETTER
Patient: Keivn Donato  : 1940    Encounter Date: 06/10/2024    Resident seen 6/10/24 -- MP    CC: SNF ([facility]) Recheck    : 1940  SNF H&P done 24  DC summary dated 24 reviewed 24  Allergy: Lisinopril (ACEi)  DNR-CC    S; 83 yo man with MCI, HTN, right sided weakness d/t to subacute and chronic strokes of right PICA d/t occulsion of right vertrebral artery. Weakness resolved but complicated by delirium. Wife at bedside provides interval history. No CP/SOB. C/O OAB. Med list & problem list reviewed.    O: VSS AFEB 198# (Stable) Awake, alert, pleasantly confused. Chest cta. Heart rrr. Ext no c/c/e.    LAB (24) Na 140, K 4.2, Cr 1.54, GFR 44, Alb 3.8    A/P:  # Weakness following subacute CVA: SNF PT/OT  # Subacute/chronic CVA Right PICA: statin (home sivmastatin changed to max dose ATORVASTATIN) d/t vetebral artery occlusion, asa, plavix  # MCI w/ delirium: ST  # FEN: B12, MVIT, Omega 3  # HTN: Losartan 100  # OAB: incontinence, tolerates myrbetriq 50 mg.  # GERD: PPI  # BPH w LUTS: Flomax  # DM w neuropathy: Lantus 38, SSI, gabapentin  # Allergic rhinitis: flonase      Electronically Signed By: Eric Warner MD   24  8:34 PM

## 2024-06-12 ENCOUNTER — NURSING HOME VISIT (OUTPATIENT)
Dept: POST ACUTE CARE | Facility: EXTERNAL LOCATION | Age: 84
End: 2024-06-12
Payer: MEDICARE

## 2024-06-12 DIAGNOSIS — Z79.4 TYPE 2 DIABETES MELLITUS WITH DIABETIC POLYNEUROPATHY, WITH LONG-TERM CURRENT USE OF INSULIN (MULTI): ICD-10-CM

## 2024-06-12 DIAGNOSIS — J30.9 ALLERGIC RHINITIS, UNSPECIFIED SEASONALITY, UNSPECIFIED TRIGGER: ICD-10-CM

## 2024-06-12 DIAGNOSIS — K59.00 CONSTIPATION, UNSPECIFIED CONSTIPATION TYPE: ICD-10-CM

## 2024-06-12 DIAGNOSIS — E11.42 TYPE 2 DIABETES MELLITUS WITH DIABETIC POLYNEUROPATHY, WITH LONG-TERM CURRENT USE OF INSULIN (MULTI): ICD-10-CM

## 2024-06-12 DIAGNOSIS — N40.0 BENIGN PROSTATIC HYPERPLASIA WITHOUT LOWER URINARY TRACT SYMPTOMS: ICD-10-CM

## 2024-06-12 DIAGNOSIS — I63.211 CEREBROVASCULAR ACCIDENT (CVA) DUE TO OCCLUSION OF RIGHT VERTEBRAL ARTERY (MULTI): Primary | ICD-10-CM

## 2024-06-12 DIAGNOSIS — K21.9 GASTROESOPHAGEAL REFLUX DISEASE, UNSPECIFIED WHETHER ESOPHAGITIS PRESENT: ICD-10-CM

## 2024-06-12 DIAGNOSIS — I10 BENIGN ESSENTIAL HYPERTENSION: ICD-10-CM

## 2024-06-12 DIAGNOSIS — E56.9 VITAMIN DEFICIENCY: ICD-10-CM

## 2024-06-12 DIAGNOSIS — M35.3 PMR (POLYMYALGIA RHEUMATICA) (MULTI): ICD-10-CM

## 2024-06-12 DIAGNOSIS — G47.00 INSOMNIA, UNSPECIFIED TYPE: ICD-10-CM

## 2024-06-12 DIAGNOSIS — E78.5 DYSLIPIDEMIA: ICD-10-CM

## 2024-06-12 DIAGNOSIS — N32.81 OAB (OVERACTIVE BLADDER): ICD-10-CM

## 2024-06-12 PROCEDURE — 99309 SBSQ NF CARE MODERATE MDM 30: CPT | Performed by: NURSE PRACTITIONER

## 2024-06-17 ENCOUNTER — NURSING HOME VISIT (OUTPATIENT)
Dept: POST ACUTE CARE | Facility: EXTERNAL LOCATION | Age: 84
End: 2024-06-17
Payer: MEDICARE

## 2024-06-17 DIAGNOSIS — N40.1 BENIGN PROSTATIC HYPERPLASIA WITH LOWER URINARY TRACT SYMPTOMS, SYMPTOM DETAILS UNSPECIFIED: ICD-10-CM

## 2024-06-17 DIAGNOSIS — I10 BENIGN ESSENTIAL HYPERTENSION: ICD-10-CM

## 2024-06-17 PROCEDURE — 99308 SBSQ NF CARE LOW MDM 20: CPT | Performed by: FAMILY MEDICINE

## 2024-06-17 NOTE — LETTER
Patient: Kevin Donato  : 1940    Encounter Date: 2024    Resident seen 24 -- MP    CC: SNF ([facility]) Recheck    : 1940  SNF H&P done 24  DC summary dated 24 reviewed 24  Allergy: Lisinopril (ACEi)  DNR-CC    S; 83 yo man with MCI, HTN, right sided weakness d/t to subacute and chronic strokes of right PICA d/t occulsion of right vertrebral artery. Weakness resolved but complicated by delirium. Wife at bedside provides interval history. No CP/SOB. C/O OAB. Med list & problem list reviewed.    O: VSS AFEB 184# (down 14#) Awake, alert, pleasantly confused. Chest cta. Heart rrr. Ext no c/c/e. 4+/5 MS UE b/l.    LAB (24) Na 140, K 4.2, Cr 1.54, GFR 44, Alb 3.8    A/P:  # Weakness following subacute CVA: SNF PT/OT  # Subacute/chronic CVA Right PICA: statin (home sivmastatin changed to max dose ATORVASTATIN) d/t vetebral artery occlusion, asa, plavix.  # MCI w/ delirium: ST  # FEN: B12, MVIT, Omega 3  # HTN: Losartan 100  # OAB: incontinence, tolerates myrbetriq 50 mg.  # GERD: PPI  # BPH w LUTS: Flomax  # DM w neuropathy: Lantus 38, SSI, gabapentin  # Allergic rhinitis: flonase      Electronically Signed By: Eric Warner MD   24  8:34 PM

## 2024-06-18 NOTE — PROGRESS NOTES
*Provider Impression*    Patient is an 84 year old male who is seen today for management of multiple medical problems    #CVA / HTN / HLD - PT/OT/ST; ASA 81mg daily, atorvastatin 80mg daily, Plavix 75mg daily, losartan 50mg daily, fish oil 1200mg daily, f/u w/ neurology, f/u w/ cardiology for Holter  #PMR - acetaminophen 650mg q4h PRN, add tramadol 25mg q6h PRN  #T2DM w/ neuropathy - Humalog SSI, lantus 43units daily, gabapentin 200mg BID  #GERD / Constipation - omeprazole 20mg daily, miralax 17grams daily, zofran 4mg q6h PRN  #Rhinitis - flonase 50mcg daily PRN  #OAB / BPH - myrbetriq 50mg daily  #Insomnia - melatonin 5mg QHS PRN  #Vitamin deficiency - vitamin D 2000units daily, vitamin B12 2000mcg daily, multivitamin daily  #ACP - DNRCC     Follow up as needed      *Chief Complaint*     CVA    *History of Present Illness*    Patient is an 83 y/o male w/ PMH as below who presented to hospital for right-sided weakness that resolved upon arrival to the ED, concern for TIA, admitted for stroke workup.  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 Echocardiogram performed.  PT/OT consulted. Pt started on DAPT, increased statin dose, and neuro checks during his stay.  Patient had no return of his initial right-sided weakness.  After permissive hypertension window, patient remained hypertensive on his home blood pressure medication, increased his home losartan dose.  He had an episode of hospital delirium, got up in the middle of the night and had a fall, needed to be placed in restraints and redirected.  CT head after fall was negative.  Patient was back to baseline mentation in the am, provide melatonin at nighttime, patient did not have a repeat episode of delirium.  PT and OT suggested discharge to SNF, patient and patient's wife were agreeable to discharge to SNF with follow-up with PCP and neurology.  He was discharged to [facility].    His losartan was decreased, lantus increased.    He is seen sitting up in his room today and reports that he is doing therapy, having some back pain w/ activity, not limiting progress, tylenol working ok, no CP, SOB, n/v, constipation, diarrhea, HA, dizziness, no edema, or any other new c/o presently. After discussion of pain management he is willing to try something stronger for pain.     (Addendum 6/14: although I transmitted order to nursing on 6/12 around 17:15, order was not entered until 6/13 at 19:30 and without notification that the order was ready to sign. Signed upon incidental discovery of this oversight on 6/14 at approx 1700. Unfortunately this led to a delay in care of almost 48 hours. Discussed with nursing staff and collaborating physician w/ plan for change to  protocol).    Allergies - Lisinopril   PMH - HTN, HLD, T2DM, vitamin deficiency, GERD, acute pancreatitis, CKD, BPH, PMR, choledocholithiasis, cholangitis, anemia, Parkinson's, hydrocephalus,   PSH - hernia repair, nose surgery, cholecystectomy  FH - Mother had T2DM; Sister had colon cancer, leukemia  SocHx -  Never smoker, No EtOH    *Review of Systems*  All other systems reviewed are negative except as noted in the HPI     *Vital Signs*   Date: 6/11/24  - T: 97.9  P: 57  R:   BP: 161/74  SpO2: 97% on RA     *Results / Data*  CBC - Date: 6/5/24  WBC: 8.6  HGB: 13.2  HCT: 39.5  PLT: 187  ;   BMP - Date: 6/5/24  Na: 137  K: 4.9  Cl: 104  CO2: 27  BUN: 44  Cr: 1.81  Glu: 210  Ca: 9.4 Phos: 3.6  Alb: 3.8  ;   LFT - Date: 5/29/24  AST: 18  ALT: 10  ALP: 84  Tbili: 0.7  ;   Coags - Date:   INR:   PT:    *Physical Exam*  Gen: (+) NAD, (+) well-appearing  HEENT: (+) normocephalic, (+) MMM  Neck: (+) supple  Lungs: (+) CTAB, (-) wheezes, (-) rales, (-) rhonchi  Heart: (+) RRR, (+) S1 S2, (-) murmurs  Pulses: (+) palpable  Abd: (+) soft, (+) NT, (+) ND, (+) BS+  Ext: (-) edema, (-) deformity  MSK: (-) joint swelling  Skin: (+)  warm, (+) dry, (-) rash  Neuro: (+) follows commands, (-) tremor, (+) alert

## 2024-06-19 ENCOUNTER — NURSING HOME VISIT (OUTPATIENT)
Dept: POST ACUTE CARE | Facility: EXTERNAL LOCATION | Age: 84
End: 2024-06-19
Payer: MEDICARE

## 2024-06-19 DIAGNOSIS — E11.42 TYPE 2 DIABETES MELLITUS WITH DIABETIC POLYNEUROPATHY, WITH LONG-TERM CURRENT USE OF INSULIN (MULTI): ICD-10-CM

## 2024-06-19 DIAGNOSIS — N32.81 OAB (OVERACTIVE BLADDER): ICD-10-CM

## 2024-06-19 DIAGNOSIS — I63.211 CEREBROVASCULAR ACCIDENT (CVA) DUE TO OCCLUSION OF RIGHT VERTEBRAL ARTERY (MULTI): Primary | ICD-10-CM

## 2024-06-19 DIAGNOSIS — N40.0 BENIGN PROSTATIC HYPERPLASIA WITHOUT LOWER URINARY TRACT SYMPTOMS: ICD-10-CM

## 2024-06-19 DIAGNOSIS — J30.9 ALLERGIC RHINITIS, UNSPECIFIED SEASONALITY, UNSPECIFIED TRIGGER: ICD-10-CM

## 2024-06-19 DIAGNOSIS — M35.3 PMR (POLYMYALGIA RHEUMATICA) (MULTI): ICD-10-CM

## 2024-06-19 DIAGNOSIS — K21.9 GASTROESOPHAGEAL REFLUX DISEASE, UNSPECIFIED WHETHER ESOPHAGITIS PRESENT: ICD-10-CM

## 2024-06-19 DIAGNOSIS — K59.00 CONSTIPATION, UNSPECIFIED CONSTIPATION TYPE: ICD-10-CM

## 2024-06-19 DIAGNOSIS — E78.5 DYSLIPIDEMIA: ICD-10-CM

## 2024-06-19 DIAGNOSIS — E56.9 VITAMIN DEFICIENCY: ICD-10-CM

## 2024-06-19 DIAGNOSIS — Z79.4 TYPE 2 DIABETES MELLITUS WITH DIABETIC POLYNEUROPATHY, WITH LONG-TERM CURRENT USE OF INSULIN (MULTI): ICD-10-CM

## 2024-06-19 DIAGNOSIS — I10 BENIGN ESSENTIAL HYPERTENSION: ICD-10-CM

## 2024-06-19 DIAGNOSIS — G47.00 INSOMNIA, UNSPECIFIED TYPE: ICD-10-CM

## 2024-06-19 PROCEDURE — 99316 NF DSCHRG MGMT 30 MIN+: CPT | Performed by: NURSE PRACTITIONER

## 2024-06-19 NOTE — PROGRESS NOTES
Resident seen 24 -- MP    CC: SNF ([facility]) Recheck    : 1940  SNF H&P done 24  DC summary dated 24 reviewed 24  Allergy: Lisinopril (ACEi)  DNR-CC    S; 85 yo man with MCI, HTN, right sided weakness d/t to subacute and chronic strokes of right PICA d/t occulsion of right vertrebral artery. Weakness resolved but complicated by delirium. Wife at bedside provides interval history. No CP/SOB. C/O OAB. Med list & problem list reviewed.    O: VSS AFEB 184# (down 14#) Awake, alert, pleasantly confused. Chest cta. Heart rrr. Ext no c/c/e. 4+/5 MS UE b/l.    LAB (24) Na 140, K 4.2, Cr 1.54, GFR 44, Alb 3.8    A/P:  # Weakness following subacute CVA: SNF PT/OT  # Subacute/chronic CVA Right PICA: statin (home sivmastatin changed to max dose ATORVASTATIN) d/t vetebral artery occlusion, asa, plavix.  # MCI w/ delirium: ST  # FEN: B12, MVIT, Omega 3  # HTN: Losartan 100  # OAB: incontinence, tolerates myrbetriq 50 mg.  # GERD: PPI  # BPH w LUTS: Flomax  # DM w neuropathy: Lantus 38, SSI, gabapentin  # Allergic rhinitis: flonase

## 2024-06-19 NOTE — PROGRESS NOTES
Resident seen 6/3/24 -- MP    CC: SNF ([facility]) Recheck    : 1940  SNF H&P done 24  DC summary dated 24 reviewed 24  Allergy: Lisinopril (ACEi)  DNR-CC    S; 83 yo man with MCI, HTN, right sided weakness d/t to subacute and chronic strokes of right PICA d/t occulsion of right vertrebral artery. Weakness resolved but complicated by delirium. Wife at bedside provides history. No CP/SOB. C/O OAB. Med list & problem list reviewed.    O: VSS AFEB 198# (Stable) Awake, alert, pleasantly confused. Chest cta. Heart rrr. Ext no c/c/e.    LAB (24) Na 140, K 4.2, Cr 1.54, GFR 44, Alb 3.8    A/P:  # Weakness following subacute CVA: SNF PT/OT  # Subacute/chronic CVA Right PICA: statin (home sivmastatin changed to max dose ATORVASTATIN) d/t vetebral artery occlusion, asa, plavix  # MCI w/ delirium: ST  # FEN: B12, MVIT, Omega 3  # HTN: Losartan 100  # OAB: increase myrbetriq to 50 mg daily.  # GERD: PPI  # BPH w LUTS: Flomax  # DM w neuropathy: Lantus 38, SSI, gabapentin  # Allergic rhinitis: flonase

## 2024-06-21 DIAGNOSIS — I63.9 CEREBROVASCULAR ACCIDENT (CVA), UNSPECIFIED MECHANISM (MULTI): ICD-10-CM

## 2024-06-21 RX ORDER — CLOPIDOGREL BISULFATE 75 MG/1
75 TABLET ORAL DAILY
Qty: 30 TABLET | Refills: 2 | Status: SHIPPED | OUTPATIENT
Start: 2024-06-21 | End: 2024-06-22 | Stop reason: SDUPTHER

## 2024-06-22 DIAGNOSIS — I10 BENIGN ESSENTIAL HYPERTENSION: ICD-10-CM

## 2024-06-22 DIAGNOSIS — G89.29 CHRONIC BILATERAL LOW BACK PAIN WITHOUT SCIATICA: ICD-10-CM

## 2024-06-22 DIAGNOSIS — I63.9 CEREBROVASCULAR ACCIDENT (CVA), UNSPECIFIED MECHANISM (MULTI): ICD-10-CM

## 2024-06-22 DIAGNOSIS — E11.40 TYPE 2 DIABETES MELLITUS WITH DIABETIC NEUROPATHY, UNSPECIFIED WHETHER LONG TERM INSULIN USE (MULTI): ICD-10-CM

## 2024-06-22 DIAGNOSIS — Z79.4 TYPE 2 DIABETES MELLITUS WITH DIABETIC NEUROPATHY, WITH LONG-TERM CURRENT USE OF INSULIN (MULTI): ICD-10-CM

## 2024-06-22 DIAGNOSIS — K21.9 GASTROESOPHAGEAL REFLUX DISEASE, UNSPECIFIED WHETHER ESOPHAGITIS PRESENT: ICD-10-CM

## 2024-06-22 DIAGNOSIS — R35.0 BENIGN PROSTATIC HYPERPLASIA WITH URINARY FREQUENCY: ICD-10-CM

## 2024-06-22 DIAGNOSIS — E78.5 HYPERLIPIDEMIA, UNSPECIFIED HYPERLIPIDEMIA TYPE: ICD-10-CM

## 2024-06-22 DIAGNOSIS — M54.50 CHRONIC BILATERAL LOW BACK PAIN WITHOUT SCIATICA: ICD-10-CM

## 2024-06-22 DIAGNOSIS — E55.9 VITAMIN D DEFICIENCY: Primary | ICD-10-CM

## 2024-06-22 DIAGNOSIS — J30.9 ALLERGIC RHINITIS, UNSPECIFIED SEASONALITY, UNSPECIFIED TRIGGER: ICD-10-CM

## 2024-06-22 DIAGNOSIS — N40.1 BENIGN PROSTATIC HYPERPLASIA WITH URINARY FREQUENCY: ICD-10-CM

## 2024-06-22 DIAGNOSIS — E11.40 TYPE 2 DIABETES MELLITUS WITH DIABETIC NEUROPATHY, WITH LONG-TERM CURRENT USE OF INSULIN (MULTI): ICD-10-CM

## 2024-06-22 RX ORDER — GABAPENTIN 100 MG/1
200 CAPSULE ORAL 2 TIMES DAILY
Qty: 120 CAPSULE | Refills: 0 | Status: SHIPPED | OUTPATIENT
Start: 2024-06-22

## 2024-06-22 RX ORDER — ACETAMINOPHEN 500 MG
2000 TABLET ORAL EVERY MORNING
Qty: 30 CAPSULE | Refills: 0 | Status: SHIPPED | OUTPATIENT
Start: 2024-06-22 | End: 2024-07-22

## 2024-06-22 RX ORDER — LOSARTAN POTASSIUM 100 MG/1
100 TABLET ORAL DAILY
Qty: 30 TABLET | Refills: 0 | Status: SHIPPED | OUTPATIENT
Start: 2024-06-22 | End: 2024-07-22

## 2024-06-22 RX ORDER — OMEPRAZOLE 20 MG/1
20 CAPSULE, DELAYED RELEASE ORAL DAILY
Qty: 30 CAPSULE | Refills: 0 | Status: SHIPPED | OUTPATIENT
Start: 2024-06-22 | End: 2024-07-22

## 2024-06-22 RX ORDER — CLOPIDOGREL BISULFATE 75 MG/1
75 TABLET ORAL DAILY
Qty: 30 TABLET | Refills: 0 | Status: SHIPPED | OUTPATIENT
Start: 2024-06-22 | End: 2024-07-22

## 2024-06-22 RX ORDER — FLUTICASONE PROPIONATE 50 MCG
1 SPRAY, SUSPENSION (ML) NASAL DAILY PRN
Qty: 16 G | Refills: 0 | Status: SHIPPED | OUTPATIENT
Start: 2024-06-22 | End: 2024-07-22

## 2024-06-22 RX ORDER — NAPROXEN SODIUM 220 MG/1
81 TABLET, FILM COATED ORAL DAILY
Qty: 30 TABLET | Refills: 0 | Status: SHIPPED | OUTPATIENT
Start: 2024-06-22 | End: 2024-07-22

## 2024-06-22 RX ORDER — INSULIN GLARGINE 100 [IU]/ML
43 INJECTION, SOLUTION SUBCUTANEOUS EVERY MORNING
Qty: 12.9 ML | Refills: 0 | Status: SHIPPED | OUTPATIENT
Start: 2024-06-22 | End: 2024-07-22

## 2024-06-22 RX ORDER — INSULIN LISPRO 100 [IU]/ML
INJECTION, SOLUTION INTRAVENOUS; SUBCUTANEOUS
Qty: 15 ML | Refills: 0 | Status: SHIPPED | OUTPATIENT
Start: 2024-06-22

## 2024-06-22 RX ORDER — ATORVASTATIN CALCIUM 80 MG/1
80 TABLET, FILM COATED ORAL NIGHTLY
Qty: 30 TABLET | Refills: 0 | Status: SHIPPED | OUTPATIENT
Start: 2024-06-22 | End: 2024-07-22

## 2024-06-22 RX ORDER — MULTIVITAMIN
1 TABLET ORAL EVERY MORNING
Qty: 30 TABLET | Refills: 0 | Status: SHIPPED | OUTPATIENT
Start: 2024-06-22 | End: 2024-07-22

## 2024-06-22 RX ORDER — NAPROXEN SODIUM 220 MG/1
1 TABLET ORAL DAILY
Qty: 30 CAPSULE | Refills: 0 | Status: SHIPPED | OUTPATIENT
Start: 2024-06-22 | End: 2024-07-22

## 2024-06-22 RX ORDER — ACETAMINOPHEN, DEXTROMETHORPHAN HBR, DOXYLAMINE SUCCINATE, PHENYLEPHRINE HCL 650; 20; 12.5; 1 MG/30ML; MG/30ML; MG/30ML; MG/30ML
2 SOLUTION ORAL EVERY MORNING
Qty: 60 TABLET | Refills: 0 | Status: SHIPPED | OUTPATIENT
Start: 2024-06-22 | End: 2024-07-22

## 2024-06-22 RX ORDER — MIRABEGRON 25 MG/1
25 TABLET, FILM COATED, EXTENDED RELEASE ORAL DAILY
Qty: 30 TABLET | Refills: 0 | Status: SHIPPED | OUTPATIENT
Start: 2024-06-22 | End: 2024-07-22

## 2024-06-23 NOTE — PROGRESS NOTES
*Provider Impression*    Patient is an 84 year old male who is seen today for management of multiple medical problems    #CVA / HTN / HLD - d/c to home with home health PT/OT/ST; ASA 81mg daily, atorvastatin 80mg daily, Plavix 75mg daily, losartan 50mg daily, fish oil 1200mg daily, f/u w/ neurology, f/u w/ cardiology for Holter  #PMR - acetaminophen 650mg q4h PRN, tramadol 25mg q6h PRN  #T2DM w/ neuropathy - Humalog SSI, lantus 43units daily, gabapentin 200mg BID  #GERD / Constipation - omeprazole 20mg daily, miralax 17grams daily, zofran 4mg q6h PRN  #Rhinitis - flonase 50mcg daily PRN  #OAB / BPH - myrbetriq 50mg daily  #Insomnia - melatonin 5mg QHS PRN  #Vitamin deficiency - vitamin D 2000units daily, vitamin B12 2000mcg daily, multivitamin daily  #ACP - DNRCC     Follow up with PCP  Time spent: >30 minutes of which greater than 50% spent in counseling and/or coordination of care     *Chief Complaint*     CVA    *History of Present Illness*    Patient is an 85 y/o male w/ PMH as below who presented to hospital for right-sided weakness that resolved upon arrival to the ED, concern for TIA, admitted for stroke workup.  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 Echocardiogram performed.  PT/OT consulted. Pt started on DAPT, increased statin dose, and neuro checks during his stay.  Patient had no return of his initial right-sided weakness.  After permissive hypertension window, patient remained hypertensive on his home blood pressure medication, increased his home losartan dose.  He had an episode of hospital delirium, got up in the middle of the night and had a fall, needed to be placed in restraints and redirected.  CT head after fall was negative.  Patient was back to baseline mentation in the am, provide melatonin at nighttime, patient did not have a repeat episode of delirium.  PT and OT suggested discharge to SNF, patient  and patient's wife were agreeable to discharge to SNF with follow-up with PCP and neurology.  He was discharged to [facility].    He is discharging to home.    He is seen sitting up in his room with his wife. Reports he is ready to go, no new pain, only once in a while in hip and back, strengh back, no CP, SOB, n/v, constipation, diarrhea, LUTTS, no new edema, or any other new c/o presently.     Allergies - Lisinopril   PMH - HTN, HLD, T2DM, vitamin deficiency, GERD, acute pancreatitis, CKD, BPH, PMR, choledocholithiasis, cholangitis, anemia, Parkinson's, hydrocephalus,   PSH - hernia repair, nose surgery, cholecystectomy  FH - Mother had T2DM; Sister had colon cancer, leukemia  SocHx -  Never smoker, No EtOH    *Review of Systems*  All other systems reviewed are negative except as noted in the HPI     *Vital Signs*   Date: 6/19/24  - T: 98.2  P: 68  R: 20  BP: 130/76  SpO2: 96% on RA     *Results / Data*  CBC - Date: 6/5/24  WBC: 8.6  HGB: 13.2  HCT: 39.5  PLT: 187  ;   BMP - Date: 6/5/24  Na: 137  K: 4.9  Cl: 104  CO2: 27  BUN: 44  Cr: 1.81  Glu: 210  Ca: 9.4 Phos: 3.6  Alb: 3.8  ;   LFT - Date: 5/29/24  AST: 18  ALT: 10  ALP: 84  Tbili: 0.7  ;   Coags - Date:   INR:   PT:    *Physical Exam*  Gen: (+) NAD, (+) well-appearing  HEENT: (+) normocephalic, (+) MMM  Neck: (+) supple  Lungs: (+) CTAB, (-) wheezes, (-) rales, (-) rhonchi  Heart: (+) RRR, (+) S1 S2, (-) murmurs  Pulses: (+) palpable  Abd: (+) soft, (+) NT, (+) ND, (+) BS+  Ext: (-) edema, (-) deformity  MSK: (-) joint swelling  Skin: (+) warm, (+) dry, (-) rash  Neuro: (+) follows commands, (-) tremor, (+) alert

## 2024-06-26 ENCOUNTER — APPOINTMENT (OUTPATIENT)
Dept: UROLOGY | Facility: CLINIC | Age: 84
End: 2024-06-26
Payer: MEDICARE

## 2024-06-27 ASSESSMENT — ENCOUNTER SYMPTOMS
NECK STIFFNESS: 0
PALPITATIONS: 0
FEVER: 0
CHILLS: 0
BLOOD IN STOOL: 0
CONFUSION: 1
BACK PAIN: 0
POLYDIPSIA: 0
RHINORRHEA: 0
HEADACHES: 0
EYE REDNESS: 0
DYSURIA: 0
BRUISES/BLEEDS EASILY: 0
SORE THROAT: 0
SHORTNESS OF BREATH: 0
COUGH: 0
FATIGUE: 0
WEAKNESS: 1
HALLUCINATIONS: 0
ADENOPATHY: 0
ABDOMINAL PAIN: 0
WOUND: 0
HEMATURIA: 0
EYE PAIN: 0

## 2024-06-28 ENCOUNTER — HOSPITAL ENCOUNTER (INPATIENT)
Facility: HOSPITAL | Age: 84
End: 2024-06-28
Attending: STUDENT IN AN ORGANIZED HEALTH CARE EDUCATION/TRAINING PROGRAM | Admitting: INTERNAL MEDICINE
Payer: MEDICARE

## 2024-06-28 DIAGNOSIS — R29.90 STROKE-LIKE SYMPTOM: Primary | ICD-10-CM

## 2024-06-28 LAB
ALBUMIN SERPL BCP-MCNC: 3.9 G/DL (ref 3.4–5)
ALP SERPL-CCNC: 91 U/L (ref 33–136)
ALT SERPL W P-5'-P-CCNC: 8 U/L (ref 10–52)
ANION GAP SERPL CALC-SCNC: 10 MMOL/L (ref 10–20)
APTT PPP: 46 SECONDS (ref 27–38)
AST SERPL W P-5'-P-CCNC: 13 U/L (ref 9–39)
ATRIAL RATE: 66 BPM
BASOPHILS # BLD AUTO: 0.06 X10*3/UL (ref 0–0.1)
BASOPHILS NFR BLD AUTO: 0.7 %
BILIRUB SERPL-MCNC: 0.8 MG/DL (ref 0–1.2)
BNP SERPL-MCNC: 51 PG/ML (ref 0–99)
BUN SERPL-MCNC: 32 MG/DL (ref 6–23)
CALCIUM SERPL-MCNC: 9.1 MG/DL (ref 8.6–10.3)
CARDIAC TROPONIN I PNL SERPL HS: 7 NG/L (ref 0–20)
CHLORIDE SERPL-SCNC: 106 MMOL/L (ref 98–107)
CHOLEST SERPL-MCNC: 89 MG/DL (ref 0–199)
CHOLESTEROL/HDL RATIO: 2.6
CO2 SERPL-SCNC: 27 MMOL/L (ref 21–32)
CREAT SERPL-MCNC: 1.53 MG/DL (ref 0.5–1.3)
EGFRCR SERPLBLD CKD-EPI 2021: 44 ML/MIN/1.73M*2
EOSINOPHIL # BLD AUTO: 0.93 X10*3/UL (ref 0–0.4)
EOSINOPHIL NFR BLD AUTO: 11.3 %
ERYTHROCYTE [DISTWIDTH] IN BLOOD BY AUTOMATED COUNT: 11.9 % (ref 11.5–14.5)
GLUCOSE BLD MANUAL STRIP-MCNC: 180 MG/DL (ref 74–99)
GLUCOSE BLD MANUAL STRIP-MCNC: 180 MG/DL (ref 74–99)
GLUCOSE BLD MANUAL STRIP-MCNC: 182 MG/DL (ref 74–99)
GLUCOSE SERPL-MCNC: 203 MG/DL (ref 74–99)
HCT VFR BLD AUTO: 34.8 % (ref 41–52)
HDLC SERPL-MCNC: 34.1 MG/DL
HGB BLD-MCNC: 12 G/DL (ref 13.5–17.5)
IMM GRANULOCYTES # BLD AUTO: 0.03 X10*3/UL (ref 0–0.5)
IMM GRANULOCYTES NFR BLD AUTO: 0.4 % (ref 0–0.9)
INR PPP: 1.1 (ref 0.9–1.1)
LDLC SERPL CALC-MCNC: 40 MG/DL
LYMPHOCYTES # BLD AUTO: 1.35 X10*3/UL (ref 0.8–3)
LYMPHOCYTES NFR BLD AUTO: 16.4 %
MCH RBC QN AUTO: 33.7 PG (ref 26–34)
MCHC RBC AUTO-ENTMCNC: 34.5 G/DL (ref 32–36)
MCV RBC AUTO: 98 FL (ref 80–100)
MONOCYTES # BLD AUTO: 0.51 X10*3/UL (ref 0.05–0.8)
MONOCYTES NFR BLD AUTO: 6.2 %
NEUTROPHILS # BLD AUTO: 5.36 X10*3/UL (ref 1.6–5.5)
NEUTROPHILS NFR BLD AUTO: 65 %
NON HDL CHOLESTEROL: 55 MG/DL (ref 0–149)
NRBC BLD-RTO: 0 /100 WBCS (ref 0–0)
P AXIS: 67 DEGREES
P OFFSET: 184 MS
P ONSET: 128 MS
PLATELET # BLD AUTO: 168 X10*3/UL (ref 150–450)
POTASSIUM SERPL-SCNC: 4.2 MMOL/L (ref 3.5–5.3)
PR INTERVAL: 174 MS
PROT SERPL-MCNC: 6.5 G/DL (ref 6.4–8.2)
PROTHROMBIN TIME: 12.1 SECONDS (ref 9.8–12.8)
Q ONSET: 215 MS
QRS COUNT: 11 BEATS
QRS DURATION: 102 MS
QT INTERVAL: 442 MS
QTC CALCULATION(BAZETT): 463 MS
QTC FREDERICIA: 456 MS
R AXIS: 19 DEGREES
RBC # BLD AUTO: 3.56 X10*6/UL (ref 4.5–5.9)
SODIUM SERPL-SCNC: 139 MMOL/L (ref 136–145)
T AXIS: 58 DEGREES
T OFFSET: 436 MS
TRIGL SERPL-MCNC: 75 MG/DL (ref 0–149)
VENTRICULAR RATE: 66 BPM
VLDL: 15 MG/DL (ref 0–40)
WBC # BLD AUTO: 8.2 X10*3/UL (ref 4.4–11.3)

## 2024-06-28 PROCEDURE — 83036 HEMOGLOBIN GLYCOSYLATED A1C: CPT | Mod: GEALAB

## 2024-06-28 PROCEDURE — 99285 EMERGENCY DEPT VISIT HI MDM: CPT

## 2024-06-28 PROCEDURE — 71045 X-RAY EXAM CHEST 1 VIEW: CPT | Performed by: RADIOLOGY

## 2024-06-28 PROCEDURE — 2500000002 HC RX 250 W HCPCS SELF ADMINISTERED DRUGS (ALT 637 FOR MEDICARE OP, ALT 636 FOR OP/ED)

## 2024-06-28 PROCEDURE — 92610 EVALUATE SWALLOWING FUNCTION: CPT | Mod: GN | Performed by: PHARMACIST

## 2024-06-28 PROCEDURE — 70450 CT HEAD/BRAIN W/O DYE: CPT | Performed by: RADIOLOGY

## 2024-06-28 PROCEDURE — 70547 MR ANGIOGRAPHY NECK W/O DYE: CPT | Performed by: RADIOLOGY

## 2024-06-28 PROCEDURE — 36415 COLL VENOUS BLD VENIPUNCTURE: CPT | Performed by: PHYSICIAN ASSISTANT

## 2024-06-28 PROCEDURE — 96372 THER/PROPH/DIAG INJ SC/IM: CPT

## 2024-06-28 PROCEDURE — 70551 MRI BRAIN STEM W/O DYE: CPT | Performed by: RADIOLOGY

## 2024-06-28 PROCEDURE — G0378 HOSPITAL OBSERVATION PER HR: HCPCS

## 2024-06-28 PROCEDURE — 2500000004 HC RX 250 GENERAL PHARMACY W/ HCPCS (ALT 636 FOR OP/ED)

## 2024-06-28 PROCEDURE — 99223 1ST HOSP IP/OBS HIGH 75: CPT

## 2024-06-28 RX ORDER — INSULIN GLARGINE 100 [IU]/ML
16 INJECTION, SOLUTION SUBCUTANEOUS EVERY 24 HOURS
Status: DISPENSED | OUTPATIENT
Start: 2024-06-28

## 2024-06-28 RX ORDER — TAMSULOSIN HYDROCHLORIDE 0.4 MG/1
0.4 CAPSULE ORAL NIGHTLY
Status: DISPENSED | OUTPATIENT
Start: 2024-06-28

## 2024-06-28 RX ORDER — PANTOPRAZOLE SODIUM 40 MG/10ML
40 INJECTION, POWDER, LYOPHILIZED, FOR SOLUTION INTRAVENOUS
Status: DISPENSED | OUTPATIENT
Start: 2024-06-29

## 2024-06-28 RX ORDER — ENOXAPARIN SODIUM 100 MG/ML
40 INJECTION SUBCUTANEOUS EVERY 24 HOURS
Status: DISCONTINUED | OUTPATIENT
Start: 2024-06-28 | End: 2024-06-28

## 2024-06-28 RX ORDER — DEXTROSE MONOHYDRATE AND SODIUM CHLORIDE 5; .9 G/100ML; G/100ML
75 INJECTION, SOLUTION INTRAVENOUS CONTINUOUS
Status: DISCONTINUED | OUTPATIENT
Start: 2024-06-28 | End: 2024-06-29

## 2024-06-28 RX ORDER — LOSARTAN POTASSIUM 50 MG/1
100 TABLET ORAL DAILY
Status: DISPENSED | OUTPATIENT
Start: 2024-06-28

## 2024-06-28 RX ORDER — PANTOPRAZOLE SODIUM 40 MG/1
40 TABLET, DELAYED RELEASE ORAL
Status: ACTIVE | OUTPATIENT
Start: 2024-06-29

## 2024-06-28 RX ORDER — TRAMADOL HYDROCHLORIDE 50 MG/1
25 TABLET ORAL EVERY 6 HOURS PRN
Status: ACTIVE | OUTPATIENT
Start: 2024-06-28

## 2024-06-28 RX ORDER — ATORVASTATIN CALCIUM 80 MG/1
80 TABLET, FILM COATED ORAL NIGHTLY
Status: DISPENSED | OUTPATIENT
Start: 2024-06-28

## 2024-06-28 RX ORDER — CLOPIDOGREL BISULFATE 75 MG/1
75 TABLET ORAL DAILY
Status: DISPENSED | OUTPATIENT
Start: 2024-06-28

## 2024-06-28 RX ORDER — DEXTROSE 50 % IN WATER (D50W) INTRAVENOUS SYRINGE
12.5
Status: ACTIVE | OUTPATIENT
Start: 2024-06-28

## 2024-06-28 RX ORDER — INSULIN LISPRO 100 [IU]/ML
0-10 INJECTION, SOLUTION INTRAVENOUS; SUBCUTANEOUS
Status: DISCONTINUED | OUTPATIENT
Start: 2024-06-28 | End: 2024-06-28

## 2024-06-28 RX ORDER — NAPROXEN SODIUM 220 MG/1
81 TABLET, FILM COATED ORAL DAILY
Status: DISPENSED | OUTPATIENT
Start: 2024-06-28

## 2024-06-28 RX ORDER — PANTOPRAZOLE SODIUM 40 MG/1
40 TABLET, DELAYED RELEASE ORAL
Status: DISCONTINUED | OUTPATIENT
Start: 2024-06-29 | End: 2024-06-28

## 2024-06-28 RX ORDER — CHOLECALCIFEROL (VITAMIN D3) 25 MCG
2000 TABLET ORAL EVERY MORNING
Status: DISPENSED | OUTPATIENT
Start: 2024-06-28

## 2024-06-28 RX ORDER — LANOLIN ALCOHOL/MO/W.PET/CERES
2000 CREAM (GRAM) TOPICAL EVERY MORNING
Status: DISPENSED | OUTPATIENT
Start: 2024-06-28

## 2024-06-28 RX ORDER — DEXTROSE 50 % IN WATER (D50W) INTRAVENOUS SYRINGE
25
Status: ACTIVE | OUTPATIENT
Start: 2024-06-28

## 2024-06-28 RX ORDER — OXYBUTYNIN CHLORIDE 5 MG/1
5 TABLET ORAL 2 TIMES DAILY
Status: DISPENSED | OUTPATIENT
Start: 2024-06-28

## 2024-06-28 RX ORDER — MIRABEGRON 25 MG/1
25 TABLET, FILM COATED, EXTENDED RELEASE ORAL DAILY
Status: DISCONTINUED | OUTPATIENT
Start: 2024-06-28 | End: 2024-06-28 | Stop reason: CLARIF

## 2024-06-28 RX ORDER — GABAPENTIN 100 MG/1
200 CAPSULE ORAL 2 TIMES DAILY
Status: DISPENSED | OUTPATIENT
Start: 2024-06-28

## 2024-06-28 RX ORDER — INSULIN LISPRO 100 [IU]/ML
0-10 INJECTION, SOLUTION INTRAVENOUS; SUBCUTANEOUS
Status: DISCONTINUED | OUTPATIENT
Start: 2024-06-28 | End: 2024-06-30

## 2024-06-28 RX ORDER — ACETAMINOPHEN 500 MG
5 TABLET ORAL NIGHTLY
Status: DISPENSED | OUTPATIENT
Start: 2024-06-28

## 2024-06-28 RX ORDER — HEPARIN SODIUM 5000 [USP'U]/ML
5000 INJECTION, SOLUTION INTRAVENOUS; SUBCUTANEOUS EVERY 8 HOURS SCHEDULED
Status: DISPENSED | OUTPATIENT
Start: 2024-06-28

## 2024-06-28 RX ORDER — INSULIN LISPRO 100 [IU]/ML
0-5 INJECTION, SOLUTION INTRAVENOUS; SUBCUTANEOUS
Status: DISCONTINUED | OUTPATIENT
Start: 2024-06-28 | End: 2024-06-28

## 2024-06-28 RX ORDER — FLUTICASONE PROPIONATE 50 MCG
1 SPRAY, SUSPENSION (ML) NASAL DAILY PRN
Status: DISPENSED | OUTPATIENT
Start: 2024-06-28

## 2024-06-28 RX ORDER — INSULIN GLARGINE 100 [IU]/ML
38 INJECTION, SOLUTION SUBCUTANEOUS EVERY 24 HOURS
Status: DISCONTINUED | OUTPATIENT
Start: 2024-06-28 | End: 2024-06-28

## 2024-06-28 RX ADMIN — INSULIN LISPRO 2 UNITS: 100 INJECTION, SOLUTION INTRAVENOUS; SUBCUTANEOUS at 18:09

## 2024-06-28 RX ADMIN — INSULIN GLARGINE 16 UNITS: 100 INJECTION, SOLUTION SUBCUTANEOUS at 21:38

## 2024-06-28 RX ADMIN — DEXTROSE AND SODIUM CHLORIDE 75 ML/HR: 5; 900 INJECTION, SOLUTION INTRAVENOUS at 18:09

## 2024-06-28 RX ADMIN — HEPARIN SODIUM 5000 UNITS: 5000 INJECTION INTRAVENOUS; SUBCUTANEOUS at 18:09

## 2024-06-28 RX ADMIN — INSULIN LISPRO 2 UNITS: 100 INJECTION, SOLUTION INTRAVENOUS; SUBCUTANEOUS at 21:38

## 2024-06-28 SDOH — SOCIAL STABILITY: SOCIAL INSECURITY: ARE THERE ANY APPARENT SIGNS OF INJURIES/BEHAVIORS THAT COULD BE RELATED TO ABUSE/NEGLECT?: NO

## 2024-06-28 SDOH — SOCIAL STABILITY: SOCIAL INSECURITY: DO YOU FEEL ANYONE HAS EXPLOITED OR TAKEN ADVANTAGE OF YOU FINANCIALLY OR OF YOUR PERSONAL PROPERTY?: NO

## 2024-06-28 SDOH — SOCIAL STABILITY: SOCIAL INSECURITY: DOES ANYONE TRY TO KEEP YOU FROM HAVING/CONTACTING OTHER FRIENDS OR DOING THINGS OUTSIDE YOUR HOME?: NO

## 2024-06-28 SDOH — SOCIAL STABILITY: SOCIAL INSECURITY: HAVE YOU HAD ANY THOUGHTS OF HARMING ANYONE ELSE?: NO

## 2024-06-28 SDOH — SOCIAL STABILITY: SOCIAL INSECURITY: ARE YOU OR HAVE YOU BEEN THREATENED OR ABUSED PHYSICALLY, EMOTIONALLY, OR SEXUALLY BY ANYONE?: NO

## 2024-06-28 SDOH — SOCIAL STABILITY: SOCIAL INSECURITY: DO YOU FEEL UNSAFE GOING BACK TO THE PLACE WHERE YOU ARE LIVING?: NO

## 2024-06-28 SDOH — SOCIAL STABILITY: SOCIAL INSECURITY: WERE YOU ABLE TO COMPLETE ALL THE BEHAVIORAL HEALTH SCREENINGS?: YES

## 2024-06-28 SDOH — SOCIAL STABILITY: SOCIAL INSECURITY: ABUSE: ADULT

## 2024-06-28 SDOH — SOCIAL STABILITY: SOCIAL INSECURITY: HAVE YOU HAD THOUGHTS OF HARMING ANYONE ELSE?: NO

## 2024-06-28 SDOH — SOCIAL STABILITY: SOCIAL INSECURITY: HAS ANYONE EVER THREATENED TO HURT YOUR FAMILY OR YOUR PETS?: NO

## 2024-06-28 ASSESSMENT — PAIN SCALES - GENERAL
PAINLEVEL_OUTOF10: 0 - NO PAIN

## 2024-06-28 ASSESSMENT — PAIN - FUNCTIONAL ASSESSMENT
PAIN_FUNCTIONAL_ASSESSMENT: 0-10

## 2024-06-28 ASSESSMENT — ENCOUNTER SYMPTOMS
NAUSEA: 0
CONFUSION: 0
SHORTNESS OF BREATH: 0
ARTHRALGIAS: 1
CHILLS: 0
VOMITING: 0
BACK PAIN: 1
FREQUENCY: 0
LIGHT-HEADEDNESS: 0
FATIGUE: 0
CHEST TIGHTNESS: 0
MYALGIAS: 0
CONSTIPATION: 0
FEVER: 0
HEADACHES: 0
COUGH: 1
DYSURIA: 0
ABDOMINAL PAIN: 0
PALPITATIONS: 0

## 2024-06-28 ASSESSMENT — ACTIVITIES OF DAILY LIVING (ADL)
FEEDING YOURSELF: INDEPENDENT
DRESSING YOURSELF: INDEPENDENT
TOILETING: INDEPENDENT
WALKS IN HOME: NEEDS ASSISTANCE
BATHING: INDEPENDENT
JUDGMENT_ADEQUATE_SAFELY_COMPLETE_DAILY_ACTIVITIES: YES
HEARING - LEFT EAR: FUNCTIONAL
GROOMING: INDEPENDENT
ADEQUATE_TO_COMPLETE_ADL: YES
ASSISTIVE_DEVICE: WALKER;CANE
PATIENT'S MEMORY ADEQUATE TO SAFELY COMPLETE DAILY ACTIVITIES?: YES
LACK_OF_TRANSPORTATION: NO
HEARING - RIGHT EAR: FUNCTIONAL

## 2024-06-28 ASSESSMENT — LIFESTYLE VARIABLES
AUDIT-C TOTAL SCORE: 1
HOW MANY STANDARD DRINKS CONTAINING ALCOHOL DO YOU HAVE ON A TYPICAL DAY: PATIENT DOES NOT DRINK
AUDIT-C TOTAL SCORE: 1
SUBSTANCE_ABUSE_PAST_12_MONTHS: NO
PRESCIPTION_ABUSE_PAST_12_MONTHS: NO
HOW OFTEN DO YOU HAVE 6 OR MORE DRINKS ON ONE OCCASION: NEVER
SKIP TO QUESTIONS 9-10: 1
HOW OFTEN DO YOU HAVE A DRINK CONTAINING ALCOHOL: MONTHLY OR LESS

## 2024-06-28 ASSESSMENT — COGNITIVE AND FUNCTIONAL STATUS - GENERAL
WALKING IN HOSPITAL ROOM: A LOT
DRESSING REGULAR LOWER BODY CLOTHING: A LOT
DAILY ACTIVITIY SCORE: 17
DRESSING REGULAR UPPER BODY CLOTHING: A LITTLE
DRESSING REGULAR LOWER BODY CLOTHING: A LITTLE
MOVING FROM LYING ON BACK TO SITTING ON SIDE OF FLAT BED WITH BEDRAILS: A LITTLE
HELP NEEDED FOR BATHING: A LITTLE
STANDING UP FROM CHAIR USING ARMS: A LOT
WALKING IN HOSPITAL ROOM: A LOT
MOVING FROM LYING ON BACK TO SITTING ON SIDE OF FLAT BED WITH BEDRAILS: A LITTLE
MOBILITY SCORE: 15
PATIENT BASELINE BEDBOUND: NO
DRESSING REGULAR UPPER BODY CLOTHING: A LITTLE
CLIMB 3 TO 5 STEPS WITH RAILING: A LITTLE
DAILY ACTIVITIY SCORE: 21
TURNING FROM BACK TO SIDE WHILE IN FLAT BAD: A LITTLE
MOVING TO AND FROM BED TO CHAIR: A LITTLE
PERSONAL GROOMING: A LITTLE
HELP NEEDED FOR BATHING: A LOT
TOILETING: A LITTLE
MOBILITY SCORE: 16
MOVING TO AND FROM BED TO CHAIR: A LITTLE
TURNING FROM BACK TO SIDE WHILE IN FLAT BAD: A LITTLE
CLIMB 3 TO 5 STEPS WITH RAILING: A LOT
STANDING UP FROM CHAIR USING ARMS: A LOT

## 2024-06-28 ASSESSMENT — COLUMBIA-SUICIDE SEVERITY RATING SCALE - C-SSRS
2. HAVE YOU ACTUALLY HAD ANY THOUGHTS OF KILLING YOURSELF?: NO
1. IN THE PAST MONTH, HAVE YOU WISHED YOU WERE DEAD OR WISHED YOU COULD GO TO SLEEP AND NOT WAKE UP?: NO
6. HAVE YOU EVER DONE ANYTHING, STARTED TO DO ANYTHING, OR PREPARED TO DO ANYTHING TO END YOUR LIFE?: NO

## 2024-06-28 NOTE — ED PROCEDURE NOTE
Procedure  Critical Care    Performed by: Velvet Eaton DO  Authorized by: Velvet Eaton DO    Critical care provider statement:     Critical care time (minutes):  25    Critical care time was exclusive of:  Separately billable procedures and treating other patients    Critical care was necessary to treat or prevent imminent or life-threatening deterioration of the following conditions: STROKE ALERT.    Critical care was time spent personally by me on the following activities:  Discussions with primary provider, development of treatment plan with patient or surrogate, blood draw for specimens, examination of patient, ordering and performing treatments and interventions, review of old charts, re-evaluation of patient's condition, ordering and review of radiographic studies and ordering and review of laboratory studies    Care discussed with: admitting provider                 Velvet Eaton DO  06/28/24 9671

## 2024-06-28 NOTE — CARE PLAN
Problem: Skin  Goal: Participates in plan/prevention/treatment measures  Outcome: Progressing  Goal: Prevent/minimize sheer/friction injuries  Outcome: Progressing  Goal: Promote skin healing  Outcome: Progressing     Problem: Fall/Injury  Goal: Not fall by end of shift  Outcome: Progressing  Goal: Verbalize understanding of personal risk factors for fall in the hospital  Outcome: Progressing     Problem: General Stroke  Goal: No symptoms of aspiration throughout shift  Outcome: Progressing     Problem: Safety - Adult  Goal: Free from fall injury  Outcome: Progressing     Problem: Chronic Conditions and Co-morbidities  Goal: Patient's chronic conditions and co-morbidity symptoms are monitored and maintained or improved  Outcome: Progressing   The patient's goals for the shift include  find out what is wrong.    The clinical goals for the shift include improve neuro checks    Over the shift, the patient did not make progress toward the following goals. Barriers to progression include none. Recommendations to address these barriers include none.

## 2024-06-28 NOTE — ED PROVIDER NOTES
History/Exam limitations: none.   Additional history was obtained from patient and EMS personnel.    HPI:       Kevin Donato is a 84 y.o. with a history of recently diagnosed posterior circulation stroke who was at [facility] for rehab for 2 weeks and recently got home but when he went up to go to the bathroom today felt like his legs were Jell-O.  When EMS arrived they states he was leaning to his right side.  [place] negative.  [place] negative.  On arrival patient states he is feeling much better and is asymptomatic with no symptoms.      Last Known Well Time: 0730        ------------------------------------------------------------------------------------------------------------------------------------------     Physical Exam:    ED Triage Vitals [06/28/24 1055]   Temperature Heart Rate Respirations BP   36 °C (96.8 °F) 78 20 (!) 141/42      Pulse Ox Temp src Heart Rate Source Patient Position   96 % -- -- --      BP Location FiO2 (%)     -- --          Gen: Not in acute distress  Head/Neck: NCAT  Eyes: Anicteric sclerae, noninjected conjunctivae  Nose: No rhinorrhea  Mouth:  MMM  Heart: Regular rate and rhythm w/ no murmurs, rubs, gallops  Lungs: CTAB w/o wheezes, rales, rhonchi, no increased work of breathing  Abdomen: Soft, NT/ND  Musculoskeletal: No deformities  Extremities: No edema.  Neurologic: Please see below for NIHSS  Skin: No rashes noted  Psychological: Calm        Interval: Baseline  Time: 12:20 PM  Person Administering Scale: Velvet Eaton DO     1a  Level of consciousness: 0=alert; keenly responsive   1b. LOC questions:  0=Performs both tasks correctly   1c. LOC commands: 0=Performs both tasks correctly   2.  Best Gaze: 0=normal   3. Visual: 0=No visual loss   4. Facial Palsy: 0=Normal symmetric movement   5a. Motor left arm: 0=No drift, limb holds 90 (or 45) degrees for full 10 seconds   5b.  Motor right arm: 0=No drift, limb holds 90 (or 45) degrees for full 10 seconds   6a. motor left  le=No drift, limb holds 90 (or 45) degrees for full 10 seconds   6b  Motor right le=No drift, limb holds 90 (or 45) degrees for full 10 seconds   7. Limb Ataxia: 0=Absent   8.  Sensory: 0=Normal; no sensory loss   9. Best Language:  0=No aphasia, normal   10. Dysarthria: 0=Normal   11. Extinction and Inattention: 0=No abnormality     Total:   0         VAN: VAN: Negative     ------------------------------------------------------------------------------------------------------------------------------------------     Medical Decision Making:     ED Course as of 24 1220      1134 EKG read by me reviewed by me is normal sinus rhythm at 66 bpm.  Normal axis.  Patient does have occasional PACs.  No significant ST segment elevation or depression. [HD]   1214 Attempted to ambulates and patient was unable to ambulate.  He is leaning to his right and barely able to  his legs.  NIH remains 0.  However will admit as he recently had a posterior circulation stroke and may have recrudescence but given that he lives at home with his wife he will probably require nursing home placement. [HD]   1219 Regardless patient would not be a candidate for tenecteplase as he is had an ischemic stroke within 3 months. [HD]      ED Course User Index  [HD] Velvet Eaton DO         Diagnoses as of 24 1220   Stroke-like symptom        Independent Interpretation of Studies: I independently interpreted the CT head and see No obvious evidence of intracranial hemorrhage    Chronic Medical Conditions Significantly Affecting Care: Recent posterior circulation stroke and SNF placement    Social Determinants of Health Significantly Affecting Care:  Lives at home with wife.  On Medicare.     External Records Reviewed: I reviewed recent and relevant outside records including: Prior MRI     Discussion of Management with Other Providers:   I discussed the patient/results with: neurology and the admitting team.        IV Thrombolysis IV Thrombolysis Checklist        IV Thrombolysis Given: No; Thrombolysis contraindication reason: History of acute ischemic stroke within 3 months        NIH is 0.  Patient feeling much better.  However when we attempted to walk him he fell completely to the right side and required for 5 people to help assist him into the bed.  Therefore patient will be readmitted as he is not safe to go home.  Social work consulted.  Inpatient team accepted.    Procedure  Procedures          Velvet Eaton DO  06/28/24 9750

## 2024-06-28 NOTE — PROGRESS NOTES
Pharmacy Medication History Review    Kevin Donato is a 84 y.o. male admitted for Stroke-like symptom. Pharmacy reviewed the patient's dptty-ox-[id number] medications and allergies for accuracy.    The list below reflectives the updated PTA list. Please review each medication in order reconciliation for additional clarification and justification.  Prior to Admission Medications   Prescriptions Last Dose Informant Patient Reported? Taking?   Lantus Solostar U-100 Insulin 100 unit/mL (3 mL) pen 6/27/2024 at pm  No Yes   Sig: Inject 43 Units under the skin once daily in the morning. Take as directed per insulin instructions.   Patient taking differently: Inject 38 Units under the skin once daily in the morning. Take as directed per insulin instructions.   acetaminophen (Tylenol) 325 mg tablet 6/27/2024  Yes Yes   Sig: Take 2 tablets (650 mg) by mouth every 4 hours if needed.   aspirin 81 mg chewable tablet 6/26/2024  No No   Sig: Chew 1 tablet (81 mg) once daily.   atorvastatin (Lipitor) 80 mg tablet 6/27/2024 at pm  No Yes   Sig: Take 1 tablet (80 mg) by mouth once daily at bedtime.   blood sugar diagnostic (Contour Test Strips) strip   Yes No   Sig: Test blood sugar twice a day   blood sugar diagnostic (True Metrix Glucose Test Strip) strip   Yes No   Sig: Test 4 times daily due to hypoglycemia   cholecalciferol (Vitamin D-3) 50 mcg (2,000 unit) capsule 6/27/2024 at pm  No Yes   Sig: Take 1 capsule (50 mcg) by mouth once daily in the morning.   clopidogrel (Plavix) 75 mg tablet 6/27/2024  No Yes   Sig: Take 1 tablet (75 mg) by mouth once daily.   cyanocobalamin, vitamin B-12, (Vitamin B-12) 1,000 mcg tablet extended release 6/27/2024 at am  No Yes   Sig: Take 2 tablets (2,000 mcg) by mouth once daily in the morning. As directed   fluticasone (Flonase) 50 mcg/actuation nasal spray Unknown  No No   Sig: Administer 1 spray into each nostril once daily as needed for rhinitis. Shake gently. Before first use, prime  "pump. After use, clean tip and replace cap.   gabapentin (Neurontin) 100 mg capsule 6/27/2024 at pm  No Yes   Sig: Take 2 capsules (200 mg) by mouth 2 times a day.   insulin lispro (HumaLOG KwikPen Insulin) 100 unit/mL injection 6/27/2024 at pm  No Yes   Sig: INJECT 3 to 7 UNITS 3 times daily   lancets 26 gauge misc   Yes No   Sig: Easy touch lancets 26G/twist MISC---check sugar twice daily   losartan (Cozaar) 100 mg tablet 6/27/2024 at am  No Yes   Sig: Take 1 tablet (100 mg) by mouth once daily.   mirabegron (Myrbetriq) 25 mg tablet extended release 24 hr 24 hr tablet 6/27/2024  No Yes   Sig: Take 1 tablet (25 mg) by mouth once daily.   multivitamin tablet 6/27/2024 at am  No Yes   Sig: Take 1 tablet by mouth once daily in the morning.   omega 3-dha-epa-fish oil 1,200 (144-216) mg capsule 6/27/2024 at am  No Yes   Sig: Take 1 capsule (1,200 mg) by mouth once daily.   omeprazole (PriLOSEC) 20 mg DR capsule 6/27/2024  No Yes   Sig: Take 1 capsule (20 mg) by mouth once daily.   pen needle, diabetic (Sure Comfort Pen Needle) 32 gauge x 5/32\" needle   No No   Sig: Take as directed. Use daily for lantus and 3times a day for humalog coverage   tamsulosin (Flomax) 0.4 mg 24 hr capsule Not Taking at pm  No No   Sig: Take 1 capsule (0.4 mg) by mouth once daily at bedtime.   Patient not taking: Reported on 6/28/2024   traMADol (Ultram) 25 mg split tablet   Yes No   Sig: Take 1 half tablet (25 mg) by mouth every 6 hours if needed for moderate pain (4 - 6).   triamcinolone (Kenalog) 0.1 % cream   No No   Sig: Apply topically 2 times a day. Apply to affected area 1-2 times daily as needed. Avoid face and groin.      Facility-Administered Medications: None         The list below reflectives the updated allergy list. Please review each documented allergy for additional clarification and justification.  Allergies  Reviewed by Karo Hinds RN on 6/28/2024        Severity Reactions Comments    Lisinopril Not Specified Hives, " Unknown             Below are additional concerns with the patient's PTA list.      Yane Armando

## 2024-06-28 NOTE — PROGRESS NOTES
06/28/24 1240   Discharge Planning   Living Arrangements Spouse/significant other   Support Systems Spouse/significant other   Assistance Needed A&OX3; independent with ADLs with rollator; doesn't drive; room air baseline and room air currently; on plavix prior to hospitalization   Type of Residence Private residence   Number of Stairs to Enter Residence 2   Number of Stairs Within Residence 12   Do you have animals or pets at home? Yes   Type of Animals or Pets 1 cat   Who is requesting discharge planning? Provider   Home or Post Acute Services Post acute facilities (Rehab/SNF/etc)   Type of Post Acute Facility Services Skilled nursing   Patient expects to be discharged to: Patient coming in Medicare observation. Patient with recent SNF stay (5/28 to 6/19)within 30days so patient can transition to snf even if obs. Patient was at [facility] and would like ot return there if possible. 2nd preference is [facility]. Referral sent to [facility] for review and to inquire if bed available for today/tomorrow   Does the patient need discharge transport arranged? Yes   RoundTrip coordination needed? Yes   Has discharge transport been arranged? No   Financial Resource Strain   How hard is it for you to pay for the very basics like food, housing, medical care, and heating? Not hard   Housing Stability   In the last 12 months, was there a time when you were not able to pay the mortgage or rent on time? N   In the last 12 months, how many places have you lived? 1   In the last 12 months, was there a time when you did not have a steady place to sleep or slept in a shelter (including now)? N   Transportation Needs   In the past 12 months, has lack of transportation kept you from medical appointments or from getting medications? no   In the past 12 months, has lack of transportation kept you from meetings, work, or from getting things needed for daily living? No     06/28/2024 1243pm  Spoke with patient and patient's spouse  and family bedside in ED

## 2024-06-28 NOTE — H&P
Internal Medicine - History and Physical Note      Patient: Kevin Donato, Age: 84 y.o., SEX: male , MRN:[id number], ROOM:CASE[facility]Y,  Code: Full Code   Admitted On: 6/28/2024   Admitting Dx: No admission diagnoses are documented for this encounter.  PCP: Yarely Schroeder DO        Attending: Velvet Eaton DO;Allan*        Chief Complaint   Patient: Kevin Donato is a 84 y.o. male who presented to the hospital for   Chief Complaint   Patient presents with    Stroke       HPI   Kevin Donato is a 84 y.o. year old male  patient with past medical history significant for IDDM-2, CKD 3, BPH, HTN, HLD, history of previous stroke, GERD, insomnia admitted on account of right-sided weakness currently being managed for ruling out stroke    The whole course started this morning when patient woke up.  Patient stated that he started feeling weak on the right side.  He denied any history of syncope or fall.  Also declined any history of paresthesia, numbness, dizziness.  Patient stated that that this episode lasted a brief timeframe and while he was being transported in the ambulance and coming to the ED his symptoms had significantly resolved.  Patient described the feeling of right-sided weakness as his leg did not work and felt like jelly. No confusion noted. No seizure activity. No bowel or bladder incontinence. Family states patient still is taking DAPT.    Patient was previously discharged from [facility] on Friday, 06/21/2024 where he was admitted for strengthening and rehabilitation. He was admitted on 05/23/2024 for strokelike symptoms with right-sided weakness.  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  Echocardiogram was also ordered for the patient.  Patient was started on DAPT, his statin was increased. his hospital course was complicated by delirium and subsequent fall.  Repeat CT head ordered was negative for any acute  intracranial hemorrhage.  Patient was ultimately discharged to SNF on 05/28/2024 once he was medically cleared    12 point Review of Systems negative unless stated above.     ROS  Review of Systems   Constitutional:  Negative for chills, fatigue and fever.   HENT:          Increased mucus production   Respiratory:  Positive for cough. Negative for chest tightness and shortness of breath.    Cardiovascular:  Positive for leg swelling. Negative for chest pain and palpitations.   Gastrointestinal:  Negative for abdominal pain, constipation, nausea and vomiting.   Endocrine: Negative for polyuria.   Genitourinary:  Negative for dysuria, frequency and urgency.   Musculoskeletal:  Positive for arthralgias and back pain. Negative for myalgias.   Neurological:  Negative for light-headedness and headaches.   Psychiatric/Behavioral:  Negative for behavioral problems and confusion.         ED COURSE:   VS: Temperature 96.8 °F, /42 mmHg, heart rate 78 bpm, respiration 20/min, O2 sat 96% on room air    Labs  - CBC: WBC 8.2, H/H 12.0/34.8, platelets 168  - BMP: Glucose 203, sodium 139, potassium 4.2, chloride 106, bicarb 27, BUN 32, creatinine 1.53  - LFTs: Albumin 3.9, alk phos 91, ALT 8, AST 13, T. bili 0.8  - Trop I 7   - PT/INR 12.1/1.1    Imaging:  CXR:  No active cardiopulmonary disease.   CT brain attack head wo IV contrast: Age-related degenerative change, but possibly with component of NPH. Otherwise no acute findings or significant interval change.     Interventions:   Patient was admitted for ruling out TIA    Past Medical History: IDDM-2, CKD 3, BPH, HTN, HLD, history of previous stroke, GERD, insomnia  Echo 05/24/2024:  1. Left ventricular systolic function is normal with a 55% estimated ejection fraction.  2. Spectral Doppler shows an impaired relaxation pattern of left ventricular diastolic filling.  3. Mild aortic valve regurgitation.  4. There is mild dilatation of the ascending aorta.    Past Surgical  History: History of hernia repair, cholecystectomy  Allergies: No known drug allergies per patient.  Lisinopril documented in the chart  Family History: Diabetes in mother, colon cancer in sister, leukemia in sister  Home Meds: Lantus Solostar 43 units under the skin once in the morning, Tylenol 650 mg every 4 hours as needed, aspirin 81 mg every day, atorvastatin 80 mg nightly, vitamin D 2000 units every day, Plavix 75 mg every day, vitamin B12 2000 U every day, Flonase every day, gabapentin 200 mg twice a day, insulin lispro 3 to 70 units 3 times a day, losartan 100 mg every day, Myrbetriq 25 mg every day, multivitamins once every day, fish oil 1200 mg once every day, omeprazole 20 mg every day, tamsulosin 0.4 mg every day, tramadol 25 mg every 6 hours as needed, triamcinolone 0.1% cream as needed    Social History:  - Coming from home, lives with wife Robina Roberston)  - Tobacco: Denies  - Alcohol: Once in a while  - Illicit Drug: Denies    HealthCare Providers:  - PCP: Yarely Schroeder,      Code Status: Full Code     Emergency contact: Extended Emergency Contact Information  Primary Emergency Contact: Robina Donato  Home Phone: [phone]  Work Phone: [phone]  Relation: Spouse     Past Medical History     Past Medical History:   Diagnosis Date    Calculus of bile duct without cholangitis or cholecystitis without obstruction 02/21/2022    Choledocholithiasis    Fever, unspecified 01/10/2022    Fever and chills    Generalized abdominal pain 01/10/2022    Generalized abdominal pain    Hiccough 08/31/2022    Intractable hiccups    Iron deficiency anemia secondary to blood loss (chronic) 02/28/2019    Iron deficiency anemia due to chronic blood loss    Otalgia, right ear 04/28/2016    Right ear pain    Other cholangitis (CMS-HCC) 02/14/2022    Ascending cholangitis    Other iron deficiency anemias 12/16/2016    Other iron deficiency anemia    Other skin changes 10/13/2017    Vesicular rash    Pain in  right shoulder 10/07/2016    Right shoulder pain    Parkinson's disease (Multi) 03/23/2020    Parkinsonism    Personal history of diseases of the skin and subcutaneous tissue 02/28/2019    History of urticaria    Personal history of other diseases of the circulatory system     History of hypertension    Personal history of other diseases of the digestive system 08/10/2018    History of pancreatitis    Personal history of other diseases of the musculoskeletal system and connective tissue 02/26/2020    History of polymyalgia rheumatica    Personal history of other diseases of the nervous system and sense organs 05/21/2018    History of hydrocephalus    Personal history of other diseases of the nervous system and sense organs 04/07/2015    History of eustachian tube dysfunction    Personal history of other diseases of the respiratory system 01/10/2019    History of sinusitis    Personal history of other diseases of urinary system 11/12/2014    History of hematuria    Personal history of other specified conditions 10/16/2017    History of edema    Personal history of other specified conditions 01/11/2022    History of jaundice    Type 2 diabetes mellitus without complications (Multi) 11/29/2022    Diabetes mellitus        Surgical History     Past Surgical History:   Procedure Laterality Date    HERNIA REPAIR  02/14/2017    Hernia Repair    NOSE SURGERY  02/14/2017    Nose Surgery    OTHER SURGICAL HISTORY  05/24/2022    Cholecystectomy       Family History     Family History   Problem Relation Name Age of Onset    Diabetes Mother      Colon cancer Sister      Leukemia Sister         Social History     Social History     Socioeconomic History    Marital status:      Spouse name: Not on file    Number of children: Not on file    Years of education: Not on file    Highest education level: Not on file   Occupational History    Not on file   Tobacco Use    Smoking status: Never    Smokeless tobacco: Never   Substance  and Sexual Activity    Alcohol use: Never    Drug use: Never    Sexual activity: Not on file   Other Topics Concern    Not on file   Social History Narrative    Not on file     Social Determinants of Health     Financial Resource Strain: Low Risk  (6/28/2024)    Overall Financial Resource Strain (CARDIA)     Difficulty of Paying Living Expenses: Not hard at all   Food Insecurity: No Food Insecurity (5/23/2024)    Hunger Vital Sign     Worried About Running Out of Food in the Last Year: Never true     Ran Out of Food in the Last Year: Never true   Transportation Needs: No Transportation Needs (6/28/2024)    PRAPARE - Transportation     Lack of Transportation (Medical): No     Lack of Transportation (Non-Medical): No   Physical Activity: Inactive (5/23/2024)    Exercise Vital Sign     Days of Exercise per Week: 0 days     Minutes of Exercise per Session: 0 min   Stress: No Stress Concern Present (5/23/2024)    [employer] - Occupational Stress Questionnaire     Feeling of Stress : Not at all   Social Connections: Moderately Isolated (5/23/2024)    Social Connection and Isolation Panel [NHANES]     Frequency of Communication with Friends and Family: More than three times a week     Frequency of Social Gatherings with Friends and Family: Three times a week     Attends Evangelical Services: Never     Active Member of Clubs or Organizations: No     Attends Club or Organization Meetings: Never     Marital Status:    Intimate Partner Violence: Not At Risk (5/23/2024)    Humiliation, Afraid, Rape, and Kick questionnaire     Fear of Current or Ex-Partner: No     Emotionally Abused: No     Physically Abused: No     Sexually Abused: No   Housing Stability: Low Risk  (6/28/2024)    Housing Stability Vital Sign     Unable to Pay for Housing in the Last Year: No     Number of Places Lived in the Last Year: 1     Unstable Housing in the Last Year: No       Tobacco Use: Low Risk  (6/28/2024)     Patient History     Smoking Tobacco Use: Never     Smokeless Tobacco Use: Never     Passive Exposure: Not on file        Social History     Substance and Sexual Activity   Alcohol Use Never        Allergies     Allergies   Allergen Reactions    Lisinopril Hives and Unknown          Meds    Scheduled medications  aspirin, 81 mg, oral, Daily  atorvastatin, 80 mg, oral, Nightly  cholecalciferol, 2,000 Units, oral, q AM  clopidogrel, 75 mg, oral, Daily  cyanocobalamin (vitamin B-12), 2 tablet, oral, q AM  enoxaparin, 40 mg, subcutaneous, q24h  gabapentin, 200 mg, oral, BID  insulin glargine, 38 Units, subcutaneous, q24h  losartan, 100 mg, oral, Daily  mirabegron, 25 mg, oral, Daily  [START ON 6/29/2024] pantoprazole, 40 mg, oral, Daily before breakfast  tamsulosin, 0.4 mg, oral, Nightly      Continuous medications     PRN medications  PRN medications: fluticasone     Objective    Physical Exam  Constitutional:       Appearance: Normal appearance.   HENT:      Mouth/Throat:      Mouth: Mucous membranes are moist.      Comments: Mild asymmetry appreciated  Cardiovascular:      Rate and Rhythm: Normal rate and regular rhythm.      Heart sounds: No murmur heard.     No friction rub. No gallop.   Pulmonary:      Effort: Respiratory distress (Secondary to oral intake of fluid) present.      Breath sounds: No wheezing.   Abdominal:      General: Abdomen is flat. Bowel sounds are normal.      Palpations: Abdomen is soft. There is no mass.      Tenderness: There is no abdominal tenderness. There is no guarding.   Musculoskeletal:         General: Swelling present. No tenderness. Normal range of motion.      Cervical back: Normal range of motion.      Right lower leg: Edema present.      Left lower leg: Edema present.   Neurological:      Mental Status: He is alert and oriented to person, place, and time.      Comments: NIH score of 2   Psychiatric:         Mood and Affect: Mood normal.         Behavior: Behavior normal.       "    Visit Vitals  /71   Pulse 80   Temp 36 °C (96.8 °F)   Resp (!) 22   Ht 1.778 m (5' 10\")   Wt 93.6 kg (206 lb 5.6 oz)   SpO2 95%   BMI 29.61 kg/m²   Smoking Status Never   BSA 2.15 m²        No intake or output data in the 24 hours ending 06/28/24 1514          Labs:   Results from last 72 hours   Lab Units 06/28/24  1115   SODIUM mmol/L 139   POTASSIUM mmol/L 4.2   CHLORIDE mmol/L 106   CO2 mmol/L 27   BUN mg/dL 32*   CREATININE mg/dL 1.53*   GLUCOSE mg/dL 203*   CALCIUM mg/dL 9.1   ANION GAP mmol/L 10   EGFR mL/min/1.73m*2 44*      Results from last 72 hours   Lab Units 06/28/24  1115   WBC AUTO x10*3/uL 8.2   HEMOGLOBIN g/dL 12.0*   HEMATOCRIT % 34.8*   PLATELETS AUTO x10*3/uL 168   NEUTROS PCT AUTO % 65.0   LYMPHS PCT AUTO % 16.4   MONOS PCT AUTO % 6.2   EOS PCT AUTO % 11.3      Lab Results   Component Value Date    CALCIUM 9.1 06/28/2024    PHOS 3.6 06/05/2024      Lab Results   Component Value Date    CRP 0.12 10/18/2017       Images    ECG 12 lead  Sinus rhythm with Premature atrial complexes  Low voltage QRS  Borderline ECG  When compared with ECG of 27-JAN-2022 10:32,  Premature atrial complexes are now Present  XR chest 1 view  Narrative: Interpreted By:  Hema Lockwood,   STUDY:  XR CHEST 1 VIEW;  6/28/2024 11:20 am      INDICATION:  Signs/Symptoms:dizzy.      COMPARISON:  None.      ACCESSION NUMBER(S):  [id number]      ORDERING CLINICIAN:  JULIAN BONE      FINDINGS:  CARDIOMEDIASTINAL SILHOUETTE AND VASCULATURE:      Cardiac size:  Within normal limits.  Aortic shadow:  Within normal limits.      Mediastinal contours: Within normal limits.      Pulmonary vasculature:  The central vasculature is unremarkable      LUNGS:  Lungs are clear.      ABDOMEN AND OTHER FINDINGS:  No remarkable upper abdominal findings.      BONES:  No acute osseous changes.      Impression: 1.  No active cardiopulmonary disease.      Signed by: Hema Lockwood 6/28/2024 11:30 AM  Dictation workstation:   [id number]  CT " brain attack head wo IV contrast  Narrative: Interpreted By:  Hema Lockwood,   STUDY:  CT BRAIN ATTACK HEAD WO IV CONTRAST;  6/28/2024 11:04 am      INDICATION:  Signs/Symptoms:Stroke Evaluation.      COMPARISON:  05/24/2024      ACCESSION NUMBER(S):  [id number]      ORDERING CLINICIAN:  SEBASTIÁN ROSENBERG      TECHNIQUE:  Sequential trans axial images were obtained  .      FINDINGS:  INTRACRANIAL:      CORTICAL SULCI AND EXTRA-AXIAL SPACES:  Mild prominence indicating  age related atrophy.      VENTRICULAR SYSTEM:  Mild-to-moderate ventriculomegaly, also probably  from age related atrophy. However, as this appears slightly greater  in relation to sulcal prominence a component of NPH is possible      CEREBRAL PARENCHYMA:  Focal hypodensity is present at the right  cerebellum laterally most likely from remote infarction.  Otherwisethere is no evidence of definite subacute infarction,  intracranial hemorrhage or mass.      EXTRACRANIAL:  There is mucosal thickening of the left maxillary air cell, with  cortical thickening of the visualized sinus walls indicating chronic  sinusitis. The calvarium is intact.      Impression: Age-related degenerative change, but possibly with component of NPH.  Otherwise no acute findings or significant interval change.      MACRO:  Hema Lockwood discussed the significance and urgency of this critical  finding by secure epic chat with  SEBASTIÁN ROSENBERG on 6/28/2024 at 11:29  am.  (**-RCF-**) Findings:  See findings.      Signed by: Hema Lockwood 6/28/2024 11:30 AM  Dictation workstation:   [id number]       Encounter Date: 06/28/24   ECG 12 lead   Result Value    Ventricular Rate 66    Atrial Rate 66    NJ Interval 174    QRS Duration 102    QT Interval 442    QTC Calculation(Bazett) 463    P Axis 67    R Axis 19    T Axis 58    QRS Count 11    Q Onset 215    P Onset 128    P Offset 184    T Offset 436    QTC Fredericia 456    Narrative    Sinus rhythm with Premature atrial complexes  Low voltage  QRS  Borderline ECG  When compared with ECG of 27-JAN-2022 10:32,  Premature atrial complexes are now Present      Encounter Date: 06/28/24   ECG 12 lead   Result Value    Ventricular Rate 66    Atrial Rate 66    WV Interval 174    QRS Duration 102    QT Interval 442    QTC Calculation(Bazett) 463    P Axis 67    R Axis 19    T Axis 58    QRS Count 11    Q Onset 215    P Onset 128    P Offset 184    T Offset 436    QTC Fredericia 456    Narrative    Sinus rhythm with Premature atrial complexes  Low voltage QRS  Borderline ECG  When compared with ECG of 27-JAN-2022 10:32,  Premature atrial complexes are now Present          Assessment and Plan    eKvin Donato is a 84 y.o. male admitted on 6/28/2024 for right-sided weakness.  Has a past medical history that is significant for IDDM-2, CKD 3, BPH, HTN, HLD, history of previous stroke, GERD, insomnia.  Currently being managed for ruling out stroke    ACUTE MEDICAL ISSUES:  # Stroke like symptom  # Rule out CVA  - NIH score : 2  - Last know normal: This morning at around 9 AM.  Subsequently had resolution of symptoms.  - EKG: NSR with a rate of 66 bpm  - ABCD2 score for TIA: 4 points, moderate risk  - Imaging: CT Head non-contrast with age-related degenerative changes but no acute or significant findings  - Echo done at previous visit for similar complaint on 05/23/2024 with LVEF 55%, impaired relaxation pattern of LV diastolic filling, AV regurgitation mild, mild dilatation of ascending aorta  PLAN:  - Labs ordered: HbA1c, lipid panel, BNP  [ ] MRI ordered; pending  - Tx: Continue home aspirin 81 mg every day, Plavix 75 mg every day, atorvastatin 80 mg every night  - Supportive: Neuro checks q4Hr, Telemetry, Zio patch at discharge: Scheduled for 07/01/2024 at 11 AM  - Consults: PT/OT consulted; SLP consulted  - NPO currently, pending SLP consult    # Rule out aspiration  - Patient seen in ED to have aspirated  - Patient continued to have cough with mucus  production  [ ] Pro-Gurpreet ordered; pending  - Nursing to do bedside swallow josephine, SLP consulted, dietitian consulted    CHRONIC MEDICAL ISSUES:  # IDDM-2: Last HbA1c from 05/23/2024 at 8.1.  Continue home Lantus 38 units every 24 hour, switched home sliding scale regimen to moderate SSI #2 (0 to 10 units) 3 times daily with meals  # CKD 3: Baseline creatinine around 1.4-2 and 1.55.  GFR 40 - 50%.  Likely fluctuating from hemodynamic instability.  Creatinine at admission 1.53 - at baseline. Avoid nephrotoxic medication administration (e.g. NSAID's, aminoglycosides, beta-lactams, quinolones, rifampin, sulfonamides, vancomycin, acyclovir, contrast agents)   # BPH: Continue Myrbetriq 25 mg every day, tamsulosin 0.4 mg every night  # HTN: Holding losartan 100 mg every day for 24 hours to allow permissive hypertension  # HLD: Continue home atorvastatin 80 mg every day  # GERD: Continue pantoprazole 40 mg every day  # Diabetic neuropathy: Continue gabapentin 200 mg twice a day  # Insomnia: On melatonin 5 mg every night  # Hypovitaminosis: Continue home vitamin D3 2000 units every day, vitamin B12 2000 mcg every day    Fluids: Bolus as needed  Electrolytes: Replete as needed  Nutrition: N.p.o. currently, dietitian and SLP consulted  Antimicrobials: None indicated  DVT ppx: Heparin 5000 units every 8 hours  GI ppx: Pantoprazole 40 mg every day  Catheter: External male urinary catheter  Lines: 20-gauge PIV in left anterior upper arm  Supplemental Oxygen: On room air  Emergency Contact: Extended Emergency Contact Information  Primary Emergency Contact: Robina Donato  Home Phone: [phone]  Work Phone: [phone]  Relation: Spouse   Code: Full Code     Disposition: Admitted to rule out stroke.  ELOS less than 48 hours    Mariusz Anderson MD  Internal Medicine, PGY- 1  06/28/24 at 3:14 PM     Disclaimer: Documentation completed with the information available at the time of input. The times in the chart may not be  reflective of actual patient care times, interventions, or procedures. Documentation occurs after the physical care of the patient. This note was dictated by speech recognition. Minor errors in transcription may be present

## 2024-06-28 NOTE — DISCHARGE INSTRUCTIONS
The following recommendations are made for you at time of hospital discharge:  - Please take your home medications as instructed prior to hospitalization.   - The following changes to your home medications have been made during your hospital stay:  - Please return on Monday, July 01st at 11:00 AM to get Holter monitor attached.  - Please follow-up with your primary care provider within 5-7 days from time of discharge. Likely will need to bring photo ID and insurance card to your appointment.   -  services has been requested to make an appointment for you however if you do not hear back from them within 2-3 days, please call [phone] to find out about appointments with  services.  - If you experience any worsening symptoms or any new acute concerns arise, please contact your primary care provider to discuss and possibly arrange an appointment. If you cannot get in touch with provider or severe symptoms are present, please return to nearest emergency room/urgent care for evaluation and treatment.

## 2024-06-28 NOTE — HOSPITAL COURSE
Brief HPI:  Kevin Donato is a 84 y.o. year old male  patient with past medical history significant for IDDM-2, CKD 3, BPH, HTN, HLD, history of previous stroke, GERD, insomnia admitted on account of right-sided weakness     Hospital Course:  Admitted for ruling out stroke.  Lab work done for the patient was Nonsignificant.  CT head done for the patient was negative for any acute intracranial hemorrhage, stroke or bleed.  Chest x-ray was also done for the patient which showed no acute/active cardiopulmonary disease.  Patient was admitted and MRI head was ordered for him.  It showed Acute small right cerebellar hemisphere ischemic infarcts with questionable hyperintense foci in vermis and left cerebellar hemisphere which may be small subacute ischemic infarcts .Loss of flow related signal in the right vertebral artery with minimal distal reconstitution of the V4 segment.  SLP was consulted for the patient due to concern for aspiration secondary to CVA.  The findings are consistent with patient having aspiration and recommended MBS studies.  Patient continued to aspirate his secretions and developed worsening respiratory status.  He went from room air to high flow nasal cannula.  He was subsequently transferred to the stepdown unit due to increasing oxygen demands.  CT chest was ordered for the patient which showed some secretions/mucus present in the distal trachea in the right main bronchus with mucous plugging and bronchial wall thickening noted throughout the airway.  Findings were discussed with the family of the patient.  Pulmonology was consulted who stated that bronc would be a temporary treatment for his aspiration but he would continue to aspirate from his recurrent stroke.  Tracheostomy was not consistent with patient's wishes.  extensive discussion was done with family on 06/30/2024. PEG will also not be consistent with patient's wishes and would not resolve his respiration problems.  Ultimately  hospice was consulted on 06/30/2024. Family made the decision to pursue hospice and patient was made DNR/Comfort care

## 2024-06-28 NOTE — SIGNIFICANT EVENT
7000 Physician Certification      As the individual's attending physician , I certify that the above-named patient:  Is being discharged to a nursing facility directly from a hospital after receiving acute patient care at the hospital, and  Requires nursing facility services for the condition for which he/she received care in the hospital, and  Requires fewer than 30 days of nursing facility services, no later than the date of discharge.    I certify that inpatient care is required at the level recommended above. To the best of my knowledge, all information provided about the individual is a true and accurate reflection of the individual's condition.    Warm/Dry

## 2024-06-28 NOTE — PROGRESS NOTES
Speech-Language Pathology Clinical Swallow Evaluation    Patient Name: Kevin Donato  MRN: [id number]  : 1940  Today's Date: 24  Start time: Start Time: 1620  Stop time: Stop Time: 1640  Time calculation (min) : Time Calculation (min): 20 min      ASSESSMENT  Impressions:  Mild oral phase dysphagia and suspected pharyngeal phase dysphagia based on clinical swallow evaluation.  Prognosis: Fair    PLAN  Recommendations:  MBSS recommended: Yes; MBSS is recommended in order to objectively assess for aspiration risk, safest/least restrictive diet, and any effective compensatory strategies.  Solid consistency: NPO  Liquid consistency: NPO except for ice chips and teaspoon sips of water, after oral care  Medication administration: Non oral    Recommend frequent/thorough oral care in order to prevent oral/pharyngeal/respiratory infection.    Recommended frequency/duration:  Skilled SLP services recommended: Yes  Frequency: 3x/week  Duration: Current admission  Discharge recommendation: Recommend HIGH intensity ST upon DC in order to ensure safety with least restrictive diet.  Treatment/Interventions: Assess diet tolerance, Bolus trials, Diet recommendations, Complete MBSS, Oral motor exercises, Pharyngeal exercises, Patient/family education  Strengths: Cognition, Motivation, Family/caregiver support, and Baseline functional status  Barriers to participation in tx: Severity of symptoms    Goals (start date 24. Anticipated time frame for goal attainment: 1 week):  Pt will consume PO trials with SLP without s/sx aspiration in order to determine readiness for PO diet.   Status: Goal initiated this date   Progress this date: N/A  Pt will participate in MBSS to determine readiness for a PO diet.   Status: Goal initiated this date   Progress this date: N/A       SUBJECTIVE    PMHx relevant to rehab:  IDDM-2, CKD 3, BPH, HTN, HLD, history of previous stroke, GERD, insomnia     Chief complaint: Pt was admitted on  6/28/24 due to right-sided weakness.    Relevant imaging results:  CT head: Age-related degenerative change, but possibly with component of NPH.  Otherwise no acute findings or significant interval change.    MRI brain pending.    General Visit Information:  Patient Class: Inpatient  Living Environment: Home  Ordering Physician: Dr. Anderson  Reason for Referral: Swallow evaluation  Prior to Session Communication: Bedside nurse    RN cleared pt to participate in session and reported that pt failed his nursing swallow screening by coughing on water in the ED, and that his lung sounds are now crackly.    Pt reported that he took a large drink of water while in the ED and felt as though he was drowning.    Date of Onset: 06/28/24  Date of Order: 06/28/24  BaseLine Diet: Regular/thin  Current Diet : NPO until swallow evaluation    Pain Assessment  Pain Assessment: 0-10  0-10 (Numeric) Pain Score: 0 - No pain    Pt was alert, pleasant, and cooperative for session.  Orientation: Did not formally assess; appeared to be oriented to situation during conversation  Ability to follow functional commands: WFL  Nutritional status: Appears well-nourished/no concerns    Respiratory status: Room air  Baseline Vocal Quality: Dysphonic, Wet (hypernasal, hoarse, intermittently wet)  Volitional Cough: Strong  Volitional Swallow: Within Functional Limits  Patient positioning: Upright in bed      OBJECTIVE  Clinical swallow evaluation completed and consisted of interview, oral motor assessment, and PO trials (ice chips x2, thin water via tsp x2, thin water via straw x1). Thicker consistencies were deferred due to pt reported sensation of pharyngeal retention with thickened liquids earlier this date.  ORAL PHASE: Natural dentition in fair condition (pt reported he is waiting to have major dental work done). Oral mucosa were pink, moist, and free of obvious lesions. Tongue deviated to the R on protrusion, though strength appeared WFL  bilaterally. R-sided palatal deviation noted at rest and during phonation. Labial strength/ROM were WFL. Labial seal was adequate. A/P transit and oral clearance were WFL.  PHARYNGEAL PHASE: Laryngeal elevation was visualized or palpated with all trials and appeared reduced, however adequacy of hyolaryngeal elevation/excursion cannot be determined at bedside. Pt completed double swallows for most trials. Wet vocal quality, throat clearing, and/or coughs were observed after 4/5 trials. Pt endorsed sensation of retention of water in his throat.    Was 3oz challenge administered: No, deferred due to safety concerns.      Treatment/Education:  Results and recommendations were relayed to: Patient, Family, Bedside nurse, and Physician  Education provided: Yes   Learner: Patient and Family   Barriers to learning: None   Method of teaching: Verbal   Topic: role of ST, results of assessment, risk for aspiration, recommended diet modifications, recommendation for MBSS, and recommendation for dysphagia follow-up   Outcome of teaching: Pt/family demonstrated good understanding  Treatment provided: No  Next Treatment Priority: Reassess swallow to determine safety for initiation of PO diet

## 2024-06-29 LAB
ALBUMIN SERPL BCP-MCNC: 4 G/DL (ref 3.4–5)
ANION GAP SERPL CALC-SCNC: 16 MMOL/L (ref 10–20)
BUN SERPL-MCNC: 28 MG/DL (ref 6–23)
CALCIUM SERPL-MCNC: 8.9 MG/DL (ref 8.6–10.3)
CHLORIDE SERPL-SCNC: 106 MMOL/L (ref 98–107)
CO2 SERPL-SCNC: 21 MMOL/L (ref 21–32)
CREAT SERPL-MCNC: 1.37 MG/DL (ref 0.5–1.3)
EGFRCR SERPLBLD CKD-EPI 2021: 50 ML/MIN/1.73M*2
ERYTHROCYTE [DISTWIDTH] IN BLOOD BY AUTOMATED COUNT: 12.1 % (ref 11.5–14.5)
EST. AVERAGE GLUCOSE BLD GHB EST-MCNC: 206 MG/DL
GLUCOSE BLD MANUAL STRIP-MCNC: 150 MG/DL (ref 74–99)
GLUCOSE BLD MANUAL STRIP-MCNC: 154 MG/DL (ref 74–99)
GLUCOSE BLD MANUAL STRIP-MCNC: 252 MG/DL (ref 74–99)
GLUCOSE BLD MANUAL STRIP-MCNC: 253 MG/DL (ref 74–99)
GLUCOSE SERPL-MCNC: 258 MG/DL (ref 74–99)
HBA1C MFR BLD: 8.8 %
HCT VFR BLD AUTO: 36.4 % (ref 41–52)
HGB BLD-MCNC: 12.1 G/DL (ref 13.5–17.5)
MAGNESIUM SERPL-MCNC: 2.05 MG/DL (ref 1.6–2.4)
MCH RBC QN AUTO: 32.8 PG (ref 26–34)
MCHC RBC AUTO-ENTMCNC: 33.2 G/DL (ref 32–36)
MCV RBC AUTO: 99 FL (ref 80–100)
NRBC BLD-RTO: 0 /100 WBCS (ref 0–0)
PHOSPHATE SERPL-MCNC: 3.8 MG/DL (ref 2.5–4.9)
PLATELET # BLD AUTO: 191 X10*3/UL (ref 150–450)
POTASSIUM SERPL-SCNC: 4.2 MMOL/L (ref 3.5–5.3)
RBC # BLD AUTO: 3.69 X10*6/UL (ref 4.5–5.9)
SODIUM SERPL-SCNC: 139 MMOL/L (ref 136–145)
WBC # BLD AUTO: 14.1 X10*3/UL (ref 4.4–11.3)

## 2024-06-29 PROCEDURE — 2500000002 HC RX 250 W HCPCS SELF ADMINISTERED DRUGS (ALT 637 FOR MEDICARE OP, ALT 636 FOR OP/ED)

## 2024-06-29 PROCEDURE — 9420000001 HC RT PATIENT EDUCATION 5 MIN

## 2024-06-29 PROCEDURE — 99233 SBSQ HOSP IP/OBS HIGH 50: CPT

## 2024-06-29 PROCEDURE — 71045 X-RAY EXAM CHEST 1 VIEW: CPT | Performed by: RADIOLOGY

## 2024-06-29 PROCEDURE — 36415 COLL VENOUS BLD VENIPUNCTURE: CPT

## 2024-06-29 PROCEDURE — 94664 DEMO&/EVAL PT USE INHALER: CPT

## 2024-06-29 PROCEDURE — 94760 N-INVAS EAR/PLS OXIMETRY 1: CPT

## 2024-06-29 PROCEDURE — 1200000002 HC GENERAL ROOM WITH TELEMETRY DAILY

## 2024-06-29 PROCEDURE — 2500000005 HC RX 250 GENERAL PHARMACY W/O HCPCS

## 2024-06-29 PROCEDURE — 94660 CPAP INITIATION&MGMT: CPT

## 2024-06-29 PROCEDURE — C9113 INJ PANTOPRAZOLE SODIUM, VIA: HCPCS

## 2024-06-29 PROCEDURE — 31720 CLEARANCE OF AIRWAYS: CPT

## 2024-06-29 PROCEDURE — 2500000004 HC RX 250 GENERAL PHARMACY W/ HCPCS (ALT 636 FOR OP/ED)

## 2024-06-29 PROCEDURE — 2500000005 HC RX 250 GENERAL PHARMACY W/O HCPCS: Performed by: INTERNAL MEDICINE

## 2024-06-29 PROCEDURE — 99222 1ST HOSP IP/OBS MODERATE 55: CPT | Performed by: STUDENT IN AN ORGANIZED HEALTH CARE EDUCATION/TRAINING PROGRAM

## 2024-06-29 PROCEDURE — 94667 MNPJ CHEST WALL 1ST: CPT

## 2024-06-29 PROCEDURE — 96372 THER/PROPH/DIAG INJ SC/IM: CPT

## 2024-06-29 PROCEDURE — 94640 AIRWAY INHALATION TREATMENT: CPT

## 2024-06-29 PROCEDURE — 2500000001 HC RX 250 WO HCPCS SELF ADMINISTERED DRUGS (ALT 637 FOR MEDICARE OP)

## 2024-06-29 RX ORDER — DEXTROSE MONOHYDRATE AND SODIUM CHLORIDE 5; .9 G/100ML; G/100ML
50 INJECTION, SOLUTION INTRAVENOUS CONTINUOUS
Status: ACTIVE | OUTPATIENT
Start: 2024-06-29

## 2024-06-29 RX ORDER — IPRATROPIUM BROMIDE AND ALBUTEROL SULFATE 2.5; .5 MG/3ML; MG/3ML
3 SOLUTION RESPIRATORY (INHALATION) EVERY 2 HOUR PRN
Status: ACTIVE | OUTPATIENT
Start: 2024-06-29

## 2024-06-29 RX ORDER — IPRATROPIUM BROMIDE AND ALBUTEROL SULFATE 2.5; .5 MG/3ML; MG/3ML
3 SOLUTION RESPIRATORY (INHALATION)
Status: DISPENSED | OUTPATIENT
Start: 2024-06-29

## 2024-06-29 RX ORDER — QUETIAPINE FUMARATE 25 MG/1
12.5 TABLET, FILM COATED ORAL ONCE
Status: COMPLETED | OUTPATIENT
Start: 2024-06-30 | End: 2024-06-30

## 2024-06-29 RX ADMIN — HEPARIN SODIUM 5000 UNITS: 5000 INJECTION INTRAVENOUS; SUBCUTANEOUS at 21:56

## 2024-06-29 RX ADMIN — GABAPENTIN 200 MG: 100 CAPSULE ORAL at 21:56

## 2024-06-29 RX ADMIN — OXYBUTYNIN CHLORIDE 5 MG: 5 TABLET ORAL at 21:56

## 2024-06-29 RX ADMIN — ATORVASTATIN CALCIUM 80 MG: 80 TABLET, FILM COATED ORAL at 21:56

## 2024-06-29 RX ADMIN — Medication 40 L/MIN: at 19:02

## 2024-06-29 RX ADMIN — INSULIN LISPRO 2 UNITS: 100 INJECTION, SOLUTION INTRAVENOUS; SUBCUTANEOUS at 21:51

## 2024-06-29 RX ADMIN — HEPARIN SODIUM 5000 UNITS: 5000 INJECTION INTRAVENOUS; SUBCUTANEOUS at 10:28

## 2024-06-29 RX ADMIN — TAMSULOSIN HYDROCHLORIDE 0.4 MG: 0.4 CAPSULE ORAL at 21:56

## 2024-06-29 RX ADMIN — INSULIN LISPRO 6 UNITS: 100 INJECTION, SOLUTION INTRAVENOUS; SUBCUTANEOUS at 12:42

## 2024-06-29 RX ADMIN — DEXTROSE AND SODIUM CHLORIDE 50 ML/HR: 5; 900 INJECTION, SOLUTION INTRAVENOUS at 13:17

## 2024-06-29 RX ADMIN — HEPARIN SODIUM 5000 UNITS: 5000 INJECTION INTRAVENOUS; SUBCUTANEOUS at 15:59

## 2024-06-29 RX ADMIN — IPRATROPIUM BROMIDE AND ALBUTEROL SULFATE 3 ML: 2.5; .5 SOLUTION RESPIRATORY (INHALATION) at 20:14

## 2024-06-29 RX ADMIN — Medication 10 L/MIN: at 08:00

## 2024-06-29 RX ADMIN — INSULIN GLARGINE 16 UNITS: 100 INJECTION, SOLUTION SUBCUTANEOUS at 21:51

## 2024-06-29 RX ADMIN — HEPARIN SODIUM 5000 UNITS: 5000 INJECTION INTRAVENOUS; SUBCUTANEOUS at 02:29

## 2024-06-29 RX ADMIN — Medication 5 MG: at 21:56

## 2024-06-29 RX ADMIN — Medication 40 L/MIN: at 23:35

## 2024-06-29 RX ADMIN — Medication 60 L/MIN: at 21:15

## 2024-06-29 RX ADMIN — PANTOPRAZOLE SODIUM 40 MG: 40 INJECTION, POWDER, FOR SOLUTION INTRAVENOUS at 10:27

## 2024-06-29 ASSESSMENT — COGNITIVE AND FUNCTIONAL STATUS - GENERAL
CLIMB 3 TO 5 STEPS WITH RAILING: A LOT
DRESSING REGULAR UPPER BODY CLOTHING: A LITTLE
EATING MEALS: A LITTLE
TOILETING: A LITTLE
DAILY ACTIVITIY SCORE: 16
WALKING IN HOSPITAL ROOM: A LOT
TURNING FROM BACK TO SIDE WHILE IN FLAT BAD: A LOT
MOBILITY SCORE: 12
HELP NEEDED FOR BATHING: A LOT
STANDING UP FROM CHAIR USING ARMS: A LOT
MOVING TO AND FROM BED TO CHAIR: A LOT
MOVING FROM LYING ON BACK TO SITTING ON SIDE OF FLAT BED WITH BEDRAILS: A LOT
PERSONAL GROOMING: A LITTLE
DRESSING REGULAR LOWER BODY CLOTHING: A LOT

## 2024-06-29 ASSESSMENT — PAIN - FUNCTIONAL ASSESSMENT: PAIN_FUNCTIONAL_ASSESSMENT: 0-10

## 2024-06-29 ASSESSMENT — PAIN SCALES - GENERAL
PAINLEVEL_OUTOF10: 0 - NO PAIN
PAINLEVEL_OUTOF10: 0 - NO PAIN

## 2024-06-29 NOTE — CARE PLAN
The patient's goals for the shift include      The clinical goals for the shift include pt will maintain adequate oxygenation throughout shift    Over the shift, the patient did make progress toward the following goals. Maintained sats over most of shift. NG placed. Monitored and medicated as ordered this shift.

## 2024-06-29 NOTE — SIGNIFICANT EVENT
Patient transferred to step down unit due to increasing O2 demands. Currently on 10L HF NC. Nursing supervisor aware and transfer order placed.

## 2024-06-29 NOTE — CONSULTS
Inpatient consult to Neurology  Consult performed by: Balwinder Gong MD  Consult ordered by: Allan Merlos DO      History Of Present Illness  Kevin oDnato is a 84 y.o. male presenting with right sided weakness. Past medical history is notable for IDDM-2, CKD 3, BPH, HTN, HLD, history of previous stroke, GERD, insomnia    Last known well: 10AM 6/28  Had stroke symptoms resolved at time of presentation: No    Around 10AM yesterday morning the patent reported that he started feeling week and falling to his right side. He was having incoordination of the right of his body as well. He denies numbness or facial droop. On presentation to the hospital he was having difficulty swallowing with concern for aspiration and was made NPO and placed on O2 NC.    He was recently admitted on 5/23/2024 for right sided weakness. 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 The patient was placed on DAPT and was taking at the time of this admission. He was also on a statin. He had been at [facility] for rehabilitation with reported improvement in strength.    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/28/2019    Iron deficiency anemia due to chronic blood loss    Otalgia, right ear 04/28/2016    Right ear pain    Other cholangitis (CMS-HCC) 02/14/2022    Ascending cholangitis    Other iron deficiency anemias 12/16/2016    Other iron deficiency  anemia    Other skin changes 10/13/2017    Vesicular rash    Pain in right shoulder 10/07/2016    Right shoulder pain    Parkinson's disease (Multi) 03/23/2020    Parkinsonism    Personal history of diseases of the skin and subcutaneous tissue 02/28/2019    History of urticaria    Personal history of other diseases of the circulatory system     History of hypertension    Personal history of other diseases of the digestive system 08/10/2018    History of pancreatitis    Personal history of other diseases of the musculoskeletal system and connective tissue 02/26/2020    History of polymyalgia rheumatica    Personal history of other diseases of the nervous system and sense organs 05/21/2018    History of hydrocephalus    Personal history of other diseases of the nervous system and sense organs 04/07/2015    History of eustachian tube dysfunction    Personal history of other diseases of the respiratory system 01/10/2019    History of sinusitis    Personal history of other diseases of urinary system 11/12/2014    History of hematuria    Personal history of other specified conditions 10/16/2017    History of edema    Personal history of other specified conditions 01/11/2022    History of jaundice    Type 2 diabetes mellitus without complications (Multi) 11/29/2022    Diabetes mellitus     Surgical History  Past Surgical History:   Procedure Laterality Date    HERNIA REPAIR  02/14/2017    Hernia Repair    NOSE SURGERY  02/14/2017    Nose Surgery    OTHER SURGICAL HISTORY  05/24/2022    Cholecystectomy     Social History  Social History     Tobacco Use    Smoking status: Never    Smokeless tobacco: Never   Substance Use Topics    Alcohol use: Never    Drug use: Never     Allergies  Lisinopril  Home Medications  Medications Prior to Admission   Medication Sig Dispense Refill Last Dose    acetaminophen (Tylenol) 325 mg tablet Take 2 tablets (650 mg) by mouth every 4 hours if needed.   6/27/2024    aspirin 81 mg chewable  tablet Chew 1 tablet (81 mg) once daily. 30 tablet 0 6/27/2024    atorvastatin (Lipitor) 80 mg tablet Take 1 tablet (80 mg) by mouth once daily at bedtime. 30 tablet 0 6/27/2024 at pm    cholecalciferol (Vitamin D-3) 50 mcg (2,000 unit) capsule Take 1 capsule (50 mcg) by mouth once daily in the morning. 30 capsule 0 6/27/2024 at pm    clopidogrel (Plavix) 75 mg tablet Take 1 tablet (75 mg) by mouth once daily. 30 tablet 0 6/27/2024    cyanocobalamin, vitamin B-12, (Vitamin B-12) 1,000 mcg tablet extended release Take 2 tablets (2,000 mcg) by mouth once daily in the morning. As directed 60 tablet 0 6/27/2024 at am    gabapentin (Neurontin) 100 mg capsule Take 2 capsules (200 mg) by mouth 2 times a day. 120 capsule 0 6/27/2024 at pm    insulin lispro (HumaLOG KwikPen Insulin) 100 unit/mL injection INJECT 3 to 7 UNITS 3 times daily (Patient taking differently: INJECT as per sliding scale: 151-200= 2 units  201-250=4 units   251-300 = 6 units  301-350=8 units  351-400=10 units  Subcutaneously before meals for DMII) 15 mL 0 6/27/2024 at pm    Lantus Solostar U-100 Insulin 100 unit/mL (3 mL) pen Inject 43 Units under the skin once daily in the morning. Take as directed per insulin instructions. (Patient taking differently: Inject 38 Units under the skin once daily in the morning. Take as directed per insulin instructions.) 12.9 mL 0 6/27/2024 at pm    losartan (Cozaar) 100 mg tablet Take 1 tablet (100 mg) by mouth once daily. 30 tablet 0 6/27/2024 at am    mirabegron (Myrbetriq) 25 mg tablet extended release 24 hr 24 hr tablet Take 1 tablet (25 mg) by mouth once daily. (Patient taking differently: Take 2 tablets (50 mg) by mouth once daily.) 30 tablet 0 6/27/2024    multivitamin tablet Take 1 tablet by mouth once daily in the morning. 30 tablet 0 6/27/2024 at am    omega 3-dha-epa-fish oil 1,200 (144-216) mg capsule Take 1 capsule (1,200 mg) by mouth once daily. 30 capsule 0 6/27/2024 at am    omeprazole (PriLOSEC) 20 mg  "DR capsule Take 1 capsule (20 mg) by mouth once daily. 30 capsule 0 6/27/2024    blood sugar diagnostic (Contour Test Strips) strip Test blood sugar twice a day       blood sugar diagnostic (True Metrix Glucose Test Strip) strip Test 4 times daily due to hypoglycemia       fluticasone (Flonase) 50 mcg/actuation nasal spray Administer 1 spray into each nostril once daily as needed for rhinitis. Shake gently. Before first use, prime pump. After use, clean tip and replace cap. 16 g 0 Unknown    lancets 26 gauge misc Easy touch lancets 26G/twist MISC---check sugar twice daily       pen needle, diabetic (Sure Comfort Pen Needle) 32 gauge x 5/32\" needle Take as directed. Use daily for lantus and 3times a day for humalog coverage 400 each 3     tamsulosin (Flomax) 0.4 mg 24 hr capsule Take 1 capsule (0.4 mg) by mouth once daily at bedtime. (Patient not taking: Reported on 6/28/2024) 30 capsule 11 Not Taking    traMADol (Ultram) 25 mg split tablet Take 1 half tablet (25 mg) by mouth every 6 hours if needed for moderate pain (4 - 6).       triamcinolone (Kenalog) 0.1 % cream Apply topically 2 times a day. Apply to affected area 1-2 times daily as needed. Avoid face and groin. (Patient not taking: Reported on 6/28/2024) 30 g 0 Not Taking     Review of Systems    On 10L O2. Lying in bed. Coughing at times.     Neurological Exam  GENERAL APPEARANCE:  No distress, alert, interactive and cooperative.      MENTAL STATE:   Orientation was normal to time, place and person. Recent and remote memory was intact.  Attention span and concentration were normal. Language testing was normal for comprehension, expression, repetition and naming.    Moderate dysarthria    CRANIAL NERVES:   CN 2   Visual fields full to confrontation.   CN 3, 4, 6   Pupils round, 4 mm in diameter, equally reactive to light. Lids symmetric; no ptosis. EOMs normal alignment, full range.   No nystagmus.   CN 5   Facial sensation intact bilaterally.   CN 7   Normal " "and symmetric facial strength. Nasolabial folds symmetric.   CN 8   Hearing intact to conversation.   CN 9   Palate elevates symmetrically.   CN 11   Normal strength of shoulder shrug and neck turning.   CN 12   Tongue midline, with normal bulk and strength; no fasciculations.     MOTOR:      MMT reveals the following MRC grades:     R L   5 5 Shoulder abduction  5 5 Elbow flexion  5 5 Elbow extension  5 5 Finger abduction  5 5 Hip flexion  5 5 Knee flexion  5 5 Knee extension  5 5 Ankle dorsiflexion  5 5 Ankle plantarflexion    REFLEXES:                       1          L  BR:               1         1  Biceps:         1          1  Triceps:        1          1  Knee:            1          1  Ankle:          1        1    Babinski: toes downgoing to plantar stimulation. No clonus or other pathologic reflexes present.     SENSORY:   In both upper and lower extremities, sensation was intact to light touch    COORDINATION:    Dysmetric of right upper extremity  Ataxic right lower extremity    GAIT:   Deferred    NIH 3 (ataxia two limbs, dysarthria)    Last Recorded Vitals  Blood pressure (!) 134/49, pulse 90, temperature 36.3 °C (97.3 °F), temperature source Temporal, resp. rate 20, height 1.778 m (5' 10\"), weight 95.9 kg (211 lb 6.7 oz), SpO2 93%.    Relevant Results  Results for orders placed or performed during the hospital encounter of 06/28/24 (from the past 24 hour(s))   POCT GLUCOSE   Result Value Ref Range    POCT Glucose 180 (H) 74 - 99 mg/dL   ECG 12 lead   Result Value Ref Range    Ventricular Rate 66 BPM    Atrial Rate 66 BPM    CA Interval 174 ms    QRS Duration 102 ms    QT Interval 442 ms    QTC Calculation(Bazett) 463 ms    P Axis 67 degrees    R Axis 19 degrees    T Axis 58 degrees    QRS Count 11 beats    Q Onset 215 ms    P Onset 128 ms    P Offset 184 ms    T Offset 436 ms    QTC Fredericia 456 ms   CBC and Auto Differential   Result Value Ref Range    WBC 8.2 4.4 - 11.3 x10*3/uL    nRBC 0.0 0.0 - " 0.0 /100 WBCs    RBC 3.56 (L) 4.50 - 5.90 x10*6/uL    Hemoglobin 12.0 (L) 13.5 - 17.5 g/dL    Hematocrit 34.8 (L) 41.0 - 52.0 %    MCV 98 80 - 100 fL    MCH 33.7 26.0 - 34.0 pg    MCHC 34.5 32.0 - 36.0 g/dL    RDW 11.9 11.5 - 14.5 %    Platelets 168 150 - 450 x10*3/uL    Neutrophils % 65.0 40.0 - 80.0 %    Immature Granulocytes %, Automated 0.4 0.0 - 0.9 %    Lymphocytes % 16.4 13.0 - 44.0 %    Monocytes % 6.2 2.0 - 10.0 %    Eosinophils % 11.3 0.0 - 6.0 %    Basophils % 0.7 0.0 - 2.0 %    Neutrophils Absolute 5.36 1.60 - 5.50 x10*3/uL    Immature Granulocytes Absolute, Automated 0.03 0.00 - 0.50 x10*3/uL    Lymphocytes Absolute 1.35 0.80 - 3.00 x10*3/uL    Monocytes Absolute 0.51 0.05 - 0.80 x10*3/uL    Eosinophils Absolute 0.93 (H) 0.00 - 0.40 x10*3/uL    Basophils Absolute 0.06 0.00 - 0.10 x10*3/uL   Comprehensive metabolic panel   Result Value Ref Range    Glucose 203 (H) 74 - 99 mg/dL    Sodium 139 136 - 145 mmol/L    Potassium 4.2 3.5 - 5.3 mmol/L    Chloride 106 98 - 107 mmol/L    Bicarbonate 27 21 - 32 mmol/L    Anion Gap 10 10 - 20 mmol/L    Urea Nitrogen 32 (H) 6 - 23 mg/dL    Creatinine 1.53 (H) 0.50 - 1.30 mg/dL    eGFR 44 (L) >60 mL/min/1.73m*2    Calcium 9.1 8.6 - 10.3 mg/dL    Albumin 3.9 3.4 - 5.0 g/dL    Alkaline Phosphatase 91 33 - 136 U/L    Total Protein 6.5 6.4 - 8.2 g/dL    AST 13 9 - 39 U/L    Bilirubin, Total 0.8 0.0 - 1.2 mg/dL    ALT 8 (L) 10 - 52 U/L   Troponin I, High Sensitivity   Result Value Ref Range    Troponin I, High Sensitivity 7 0 - 20 ng/L   Protime-INR   Result Value Ref Range    Protime 12.1 9.8 - 12.8 seconds    INR 1.1 0.9 - 1.1   APTT   Result Value Ref Range    aPTT 46 (H) 27 - 38 seconds   Hemoglobin A1C   Result Value Ref Range    Hemoglobin A1C 8.8 (H) see below %    Estimated Average Glucose 206 Not Established mg/dL   B-Type Natriuretic Peptide   Result Value Ref Range    BNP 51 0 - 99 pg/mL   Lipid Panel   Result Value Ref Range    Cholesterol 89 0 - 199 mg/dL     HDL-Cholesterol 34.1 mg/dL    Cholesterol/HDL Ratio 2.6     LDL Calculated 40 <=99 mg/dL    VLDL 15 0 - 40 mg/dL    Triglycerides 75 0 - 149 mg/dL    Non HDL Cholesterol 55 0 - 149 mg/dL   POCT GLUCOSE   Result Value Ref Range    POCT Glucose 180 (H) 74 - 99 mg/dL   POCT GLUCOSE   Result Value Ref Range    POCT Glucose 182 (H) 74 - 99 mg/dL   CBC   Result Value Ref Range    WBC 14.1 (H) 4.4 - 11.3 x10*3/uL    nRBC 0.0 0.0 - 0.0 /100 WBCs    RBC 3.69 (L) 4.50 - 5.90 x10*6/uL    Hemoglobin 12.1 (L) 13.5 - 17.5 g/dL    Hematocrit 36.4 (L) 41.0 - 52.0 %    MCV 99 80 - 100 fL    MCH 32.8 26.0 - 34.0 pg    MCHC 33.2 32.0 - 36.0 g/dL    RDW 12.1 11.5 - 14.5 %    Platelets 191 150 - 450 x10*3/uL   Renal Function Panel   Result Value Ref Range    Glucose 258 (H) 74 - 99 mg/dL    Sodium 139 136 - 145 mmol/L    Potassium 4.2 3.5 - 5.3 mmol/L    Chloride 106 98 - 107 mmol/L    Bicarbonate 21 21 - 32 mmol/L    Anion Gap 16 10 - 20 mmol/L    Urea Nitrogen 28 (H) 6 - 23 mg/dL    Creatinine 1.37 (H) 0.50 - 1.30 mg/dL    eGFR 50 (L) >60 mL/min/1.73m*2    Calcium 8.9 8.6 - 10.3 mg/dL    Phosphorus 3.8 2.5 - 4.9 mg/dL    Albumin 4.0 3.4 - 5.0 g/dL   Magnesium   Result Value Ref Range    Magnesium 2.05 1.60 - 2.40 mg/dL   POCT GLUCOSE   Result Value Ref Range    POCT Glucose 253 (H) 74 - 99 mg/dL      IMAGING  TTE  No echocardiogram results found for the past 14 days     MR brain wo IV contrast  IMPRESSION:  MRI Brain:      Acute small right cerebellar hemisphere ischemic infarcts without  significant mass effect. Adjacent remote appearing ischemic infarcts  noted as well as an adjacent chronic focus of hemosiderin deposition.      Faint questionable DWI hyperintense foci in the vermis and left  cerebellar hemisphere which may represent subacute small ischemic  infarcts.      Generalized parenchymal volume loss and nonspecific white matter  changes.      MRA:      Loss of flow related signal in the right vertebral artery with  minimal  distal reconstitution of the V4 segment, unchanged from prior  imaging      There is moderate focal narrowing noted in the distal right M1  segment and proximal right M2 segments, unchanged from prior imaging.      No evidence of new or significant changes intracranial or cervical  vascular stenosis/occlusion.    CT head wo IV contrast  No CT head results found for the past 14 days     IV Thrombolysis    IV Thrombolysis Given: No; Thrombolysis contraindication reason: Time from Last Known Well (or stroke onset) is >4.5 hours        Assessment/Plan   Kevin Donato is a 84 y.o. male presenting with right sided weakness. 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 NIH 3 for two limb ataxia and dysarthria. MRA with right vertebral occlusion that was previously seen on scan from 5/2024. Previous TTE from that admission shows normal LVEF, normal LA. Negative bubble study.    Patient having increased oxygen needs concerning for aspiration.    Type: Ischemic stroke  Subtype/etiology: atherothrombotic  Vessels involved: R PICA  Neurological manifestations:  NIHSS (worst at presentation): 5   Diagnostic evaluation:   Hemoglobin A1c 8.8, total cholesterol 89, LDL 40, G 75    Risk Factors: Diabetes, Hypertension, and Hyperlipidemia    Plan  - Continue DAPT, no hx of atrial fibrillation or other cardiac findings to indicate anticoagulation at this time.  - Continue statin  -PT/OT/SLP evaluation. Will likely need NGT. MBS planned by SLP when patient is stable enough from respiratory stand point to be tested.    Follow up with neurology stroke on discharge.    Vascular Risk Factor modification goals:  Blood pressure goals: avoid hypotension SBP <100 that could worsen cerebral perfusion, Ischemic stroke- early permissive hypertension SBP < 220 mmHg with cautious inpatient lowering  Lipid Goals: education on healthy diet and statin therapy to maintain or achieve goal LDL-cholesterol < 70mg  Glucose Goals:  early treatment of hyperglycemia to goal glucose 140-180 mg/dl with long-term goal A1c < 7%   Smoking Cessation and Education  Assessment for Rehabilitation needs   Patient and family education on signs and symptoms of stroke, calling 911, healthy strategies for stroke prevention.      I spent 60 minutes in the professional and overall care of this patient.    Balwinder Gong MD

## 2024-06-29 NOTE — PROGRESS NOTES
Physical Therapy                 Therapy Communication Note    Patient Name: Kevin Donato  MRN: [id number]  Today's Date: 6/29/2024     Discipline: Physical Therapy    Missed Visit Reason: Missed Visit Reason: Patient placed on medical hold    Missed Time: Attempt at 1126    Comment: Patient placed on medical hold (Order received, chart review completed. RN deferred PT on this date. Pt not appropriate for PT evaluation secondary to resp failure due to aspiration. No PT evaluation completed at this time.)

## 2024-06-29 NOTE — PROGRESS NOTES
Occupational Therapy                 Therapy Communication Note    Patient Name: Kevin Donato  MRN: [id number]  Today's Date: 6/29/2024     Discipline: Occupational Therapy    Missed Visit Reason: Missed Visit Reason: Patient placed on medical hold (Order received, chart review completed.  RN deferred OT on this date. Pt not appropriate for OT evaluation secondary to resp failure due to aspiration. No OT evaluation completed at this time.)    Missed Time: Attempt

## 2024-06-29 NOTE — PROGRESS NOTES
Patient: Kevin Donato Age: 84 y.o.   Gender: male Room/bed: 220/220-A     Attending: Allan Merlos DO  Code Status:  DNR and No Intubation    Overnight Events  Overnight, Lantus decreased from 30 to 16 units. Patient desatted and placed on 3L NC oxygen. Chest x-ray ordered and unchanged from previous.     Subjective  Patient seen and examined this morning, coughing frequently and sounds very congested/like he needs suctioned. Denies any other complaints. Pt desatted and increased to 10LHF NC oxygen. Transferred to step down unit.     Objective:  Physical Exam  Constitutional:       General: He is not in acute distress.     Appearance: Normal appearance.   HENT:      Head: Normocephalic and atraumatic.      Right Ear: Tympanic membrane, ear canal and external ear normal. There is no impacted cerumen.      Left Ear: Tympanic membrane, ear canal and external ear normal. There is no impacted cerumen.      Nose: Nose normal. No congestion or rhinorrhea.      Mouth/Throat:      Mouth: Mucous membranes are moist.      Pharynx: Oropharynx is clear. No oropharyngeal exudate or posterior oropharyngeal erythema.   Eyes:      General: No scleral icterus.        Right eye: No discharge.         Left eye: No discharge.      Extraocular Movements: Extraocular movements intact.      Conjunctiva/sclera: Conjunctivae normal.      Pupils: Pupils are equal, round, and reactive to light.   Neck:      Vascular: No carotid bruit.   Cardiovascular:      Rate and Rhythm: Normal rate and regular rhythm.      Pulses: Normal pulses.      Heart sounds: Normal heart sounds. No murmur heard.     No friction rub. No gallop.   Pulmonary:      Effort: Respiratory distress present.      Breath sounds: Normal breath sounds. No wheezing, rhonchi or rales.      Comments: Pt with coarse and gurgling breath sounds from fluid, likely aspirating   Abdominal:      General: Abdomen is flat. Bowel sounds are normal. There is no distension.       Palpations: Abdomen is soft. There is no mass.      Tenderness: There is no abdominal tenderness. There is no guarding or rebound.   Musculoskeletal:         General: No swelling or tenderness. Normal range of motion.      Cervical back: Normal range of motion and neck supple. No rigidity or tenderness.   Skin:     General: Skin is warm and dry.      Coloration: Skin is not jaundiced.      Findings: No erythema or rash.   Neurological:      General: No focal deficit present.      Mental Status: He is alert and oriented to person, place, and time. Mental status is at baseline.      Motor: No weakness.   Psychiatric:         Mood and Affect: Mood normal.         Behavior: Behavior normal.          Temp:  [36.3 °C (97.3 °F)-36.4 °C (97.5 °F)] 36.3 °C (97.3 °F)  Heart Rate:  [57-90] 67  Resp:  [12-22] 18  BP: (133-194)/(49-79) 133/65      Intake/Output Summary (Last 24 hours) at 6/29/2024 1130  Last data filed at 6/28/2024 2110  Gross per 24 hour   Intake 0 ml   Output 300 ml   Net -300 ml       Vitals:    06/28/24 1528   Weight: 95.9 kg (211 lb 6.7 oz)             I/Os    Intake/Output Summary (Last 24 hours) at 6/29/2024 1130  Last data filed at 6/28/2024 2110  Gross per 24 hour   Intake 0 ml   Output 300 ml   Net -300 ml       Labs:   CBC:  Recent Labs     06/29/24 0618 06/28/24  1115 06/05/24  0833   WBC 14.1* 8.2 8.6   HGB 12.1* 12.0* 13.2*   HCT 36.4* 34.8* 39.5*    168 187   MCV 99 98 98     CMP:  Recent Labs     06/29/24 0618 06/28/24  1115 06/05/24  0833    139 137   K 4.2 4.2 4.9    106 104   CO2 21 27 27   ANIONGAP 16 10 11   BUN 28* 32* 44*   CREATININE 1.37* 1.53* 1.81*   EGFR 50* 44* 36*   GLUCOSE 258* 203* 210*     Recent Labs     06/29/24 0618 06/28/24  1115 06/05/24  0833 05/29/24  0807 05/25/24  1117 05/23/24  1408 09/26/22  1100 08/31/22  1643 01/14/22  1225 01/11/22  1200 08/30/19  0956 07/08/19  1310   ALBUMIN 4.0 3.9 3.8 3.8   < > 4.1   < > 4.3   < > 3.8   < > 4.5   ALKPHOS   "--  91  --  84  --  67   < > 83   < > 218*   < > 84   ALT  --  8*  --  10  --  7*   < > 7*   < > 74*   < > 8*   AST  --  13  --  18  --  15   < > 13   < > 54*   < > 14   BILITOT  --  0.8  --  0.7  --  0.9   < > 0.6   < > 3.4*   < > 1.0   LIPASE  --   --   --   --   --   --   --  23  --  13  --  11    < > = values in this interval not displayed.     Calcium/Phos:   Lab Results   Component Value Date    CALCIUM 8.9 06/29/2024    PHOS 3.8 06/29/2024      COAG:   Recent Labs     06/28/24 1115 05/23/24  1408 06/25/18  1012   INR 1.1 1.0 1.1     CRP:   Lab Results   Component Value Date    CRP 0.12 10/18/2017      [unfilled]   ENDO:  Recent Labs     06/28/24  1115 05/23/24  1408 05/15/24  1704 11/29/23  1208 11/15/23  1305 05/17/23  1242 09/26/22  1100 03/23/22  1134   TSH  --   --   --  1.45  --  1.52 1.08 0.80   HGBA1C 8.8* 8.1* 8.3*  --  7.4* 7.4* 7.7* 7.9*      CARDIAC:   Recent Labs     06/28/24  1115 05/24/24  0534 05/23/24  1408 08/31/22  1643   TROPHS 7  --  6 7   BNP 51 159*  --   --      Recent Labs     06/28/24  1115 05/23/24  1408 11/29/23  1208 05/17/23  1242 09/26/22  1100 03/23/22  1134   CHOL 89 147 150 133 133 116   LDLF  --   --   --  66 74 57   LDLCALC 40 78 81  --   --   --    HDL 34.1 40.4 43.5 47.2 40.7 41.8   TRIG 75 143 130 100 91 85     No data recorded    Micro/ID:   No results found for the last 90 days.                   No lab exists for component: \"AGALPCRNB\"   .ID  No results found for: \"URINECULTURE\", \"BLOODCULT\", \"CSFCULTSMEAR\"    Images:  XR chest 1 view  Narrative: Interpreted By:  Maria M Mckinnon,   STUDY:  XR CHEST 1 VIEW;  6/29/2024 7:08 am      INDICATION:  Signs/Symptoms:hypoxemia overnight.      COMPARISON:  06/28/2024      ACCESSION NUMBER(S):  [id number]      ORDERING CLINICIAN:  NIK GUILLERMO      FINDINGS:  CARDIOMEDIASTINAL SILHOUETTE:  Cardiomediastinal silhouette is normal in size and configuration.          LUNGS:  Lungs are clear.      ABDOMEN:  No remarkable " upper abdominal findings.          BONES:  No acute osseous changes.      Impression: No acute cardiopulmonary process.      MACRO:  None      Signed by: Maria M Mckinnon 6/29/2024 8:16 AM  Dictation workstation:   [id number]       Medications:  Scheduled medications  aspirin, 81 mg, oral, Daily  atorvastatin, 80 mg, oral, Nightly  cholecalciferol, 2,000 Units, oral, q AM  clopidogrel, 75 mg, oral, Daily  cyanocobalamin, 2,000 mcg, oral, q AM  gabapentin, 200 mg, oral, BID  heparin (porcine), 5,000 Units, subcutaneous, q8h CHARISSA  insulin glargine, 16 Units, subcutaneous, q24h  insulin lispro, 0-10 Units, subcutaneous, With meals & nightly  [Held by provider] losartan, 100 mg, oral, Daily  melatonin, 5 mg, oral, Nightly  oxybutynin, 5 mg, oral, BID  pantoprazole, 40 mg, oral, Daily before breakfast   Or  pantoprazole, 40 mg, intravenous, Daily before breakfast  tamsulosin, 0.4 mg, oral, Nightly      Continuous medications     PRN medications  PRN medications: dextrose, dextrose, fluticasone, glucagon, glucagon, traMADol     Kevin Donato is a 84 y.o. male admitted on 6/28/2024 for right-sided weakness.  Has a past medical history that is significant for IDDM-2, CKD 3, BPH, HTN, HLD, history of previous stroke, GERD, insomnia.  Currently being managed for right cerebellar CVA, strict NPO.     Assessment and Plan    ACUTE MEDICAL ISSUES:  # Right Cerebellar and midbrain CVA  - NIH score : 2  - Last know normal: This morning at around 9 AM.  Subsequently had resolution of symptoms.  - EKG: NSR with a rate of 66 bpm  - ABCD2 score for TIA: 4 points, moderate risk  - Imaging: CT Head non-contrast with age-related degenerative changes but no acute or significant findings  - Echo done at previous visit for similar complaint on 05/23/2024 with LVEF 55%, impaired relaxation pattern of LV diastolic filling, AV regurgitation mild, mild dilatation of ascending aorta  PLAN:  - MRI brain showed acute small right cerebellar  hemisphere ischemic infracts, questionable hyperintense foci in vermis and left cerebellar hemisphere which may be small subacute ischemic infarcts   - Tx: Continue home aspirin 81 mg every day, Plavix 75 mg every day, atorvastatin 80 mg every night  - Supportive: Neuro checks q4Hr, Telemetry, Zio patch at discharge: Scheduled for 07/01/2024 at 11 AM  - Consults: PT/OT consulted; SLP consulted  - NPO currently, pending SLP consult  - Will try to place NG for meds      # AHRF 2/2 CVA vs Aspiration   - Patient seen in ED to have aspirated  - Patient continued to have cough with mucus production  - CXR done 6/29 for desatting overnight was stable compared to previous   [ ] Pro-Gurpreet ordered; pending  - Nursing to do bedside swallow eval, SLP consulted, dietitian consulted  - Plan for re-assessment of swallow function on Monday, consider MBSS  - Stringent oral care routine (4-6x/day) with suction toothbrush for comfort and to mitigate aspiration risk    CHRONIC MEDICAL ISSUES:  # IDDM-2: Last HbA1c from 05/23/2024 at 8.1. Decrease Lantus from 38 units to 16 units units every 24 hour, switched home sliding scale regimen to moderate SSI #2 (0 to 10 units) 3 times daily with meals  # CKD 3: Baseline creatinine around 1.4-2 and 1.55.  GFR 40 - 50%.  Likely fluctuating from hemodynamic instability.  Creatinine at admission 1.53 - at baseline. Avoid nephrotoxic medication administration (e.g. NSAID's, aminoglycosides, beta-lactams, quinolones, rifampin, sulfonamides, vancomycin, acyclovir, contrast agents)   # BPH: Continue Myrbetriq 25 mg every day, tamsulosin 0.4 mg every night  # HTN: Holding losartan 100 mg every day for 24 hours to allow permissive hypertension  # HLD: Continue home atorvastatin 80 mg every day  # GERD: Continue pantoprazole 40 mg every day  # Diabetic neuropathy: Continue gabapentin 200 mg twice a day  # Insomnia: On melatonin 5 mg every night  # Hypovitaminosis: Continue home vitamin D3 2000 units every  day, vitamin B12 2000 mcg every day     Fluids: Bolus as needed  Electrolytes: Replete as needed  Nutrition: N.p.o. currently, dietitian and SLP consulted  Antimicrobials: None indicated  DVT ppx: Heparin 5000 units every 8 hours  GI ppx: Pantoprazole 40 mg every day  Catheter: External male urinary catheter  Lines: 20-gauge PIV in left anterior upper arm  Supplemental Oxygen: On room air  Emergency Contact: Extended Emergency Contact Information  Primary Emergency Contact: Kumar Donatoine  Home Phone: [phone]  Work Phone: [phone]  Relation: Spouse   Code: Full Code      Disposition: Strict NPO until swallow reassessment on Monday, will try to place NGT today. Neuro following.     Pam Reeder, DO  PGY-2 Internal Medicine

## 2024-06-29 NOTE — CARE PLAN
Problem: General Stroke  Goal: Establish a mutual long term goal with patient by discharge  Outcome: Progressing  Goal: Demonstrate improvement in neurological exam throughout the shift  Outcome: Progressing  Goal: Participate in treatment (ie., meds, therapy) throughout shift  Outcome: Progressing   The patient's goals for the shift include      The clinical goals for the shift include pt will maintain adequate oxygenation throughout shift

## 2024-06-29 NOTE — PROGRESS NOTES
Speech-Language Pathology                      Therapy Communication Note    Patient Name: Kevin Donato  MRN: [id number]  Today's Date: 6/29/2024     Discipline: Speech Language Pathology    Missed Visit Reason:  Swallow re-evaluation deferred this date. Pt is not appropriate for PO trials for swallow re-assessment as his O2 requirements have significantly increased (currently HFNC - 10L). SLP participated in family education in collaboration with neurology. Family currently deciding between NG tube and PEG tube for hydration/nutrition requirements at this time. SLP provided education on importance of maintaining hydration/nutrition in the rehabilitation process. Noted that aspiration precautions would still need to be implemented with both NG or PEG tube (elevated HOB, good oral care routine). Reviewed current SLP recommendations for pt to participate in MBSS when he is safely able to participate. Confirmed with family that MBSS can still be completed if patient opts for NG tube or PEG tube.     SLP provided education to pt, pt's spouse, and pt's daughter regarding pt's current risk of pulmonary complications associated with suspected aspiration in the context of the 3 pillars of aspiration (immunocompromised, aspirating, compromised oral health/hygiene). Recommended to both bedside nurse and family that pt remain NPO and participate in stringent oral care routine (4-6x/day) with suction toothbrush in order to promote healthy oral microbiome and to mitigate aspiration risk. Nursing and family verbalized understanding.     Recommend re-assessment of swallow fx on Monday to determine if pt is able to safely participate in MBSS to further inform ST POC.     Missed Time: Attempt; SLP present with pt/family from 11:20-11:50am to answer questions/provide caregiver education.

## 2024-06-29 NOTE — CARE PLAN
The patient's goals for the shift include      The clinical goals for the shift include eat and drink    Over the shift, the patient did not make progress toward the following goals. Barriers to progression include NPO. Recommendations to address these barriers include Pt needs MBS.

## 2024-06-30 VITALS
BODY MASS INDEX: 30.27 KG/M2 | HEIGHT: 70 IN | OXYGEN SATURATION: 96 % | WEIGHT: 211.42 LBS | TEMPERATURE: 97.9 F | DIASTOLIC BLOOD PRESSURE: 38 MMHG | RESPIRATION RATE: 27 BRPM | SYSTOLIC BLOOD PRESSURE: 102 MMHG | HEART RATE: 78 BPM

## 2024-06-30 LAB
ALBUMIN SERPL BCP-MCNC: 3.5 G/DL (ref 3.4–5)
ANION GAP SERPL CALC-SCNC: 10 MMOL/L (ref 10–20)
BUN SERPL-MCNC: 31 MG/DL (ref 6–23)
CALCIUM SERPL-MCNC: 8.6 MG/DL (ref 8.6–10.3)
CHLORIDE SERPL-SCNC: 108 MMOL/L (ref 98–107)
CO2 SERPL-SCNC: 26 MMOL/L (ref 21–32)
CREAT SERPL-MCNC: 1.44 MG/DL (ref 0.5–1.3)
EGFRCR SERPLBLD CKD-EPI 2021: 47 ML/MIN/1.73M*2
ERYTHROCYTE [DISTWIDTH] IN BLOOD BY AUTOMATED COUNT: 12.6 % (ref 11.5–14.5)
GLUCOSE BLD MANUAL STRIP-MCNC: 151 MG/DL (ref 74–99)
GLUCOSE BLD MANUAL STRIP-MCNC: 156 MG/DL (ref 74–99)
GLUCOSE BLD MANUAL STRIP-MCNC: 172 MG/DL (ref 74–99)
GLUCOSE BLD MANUAL STRIP-MCNC: 173 MG/DL (ref 74–99)
GLUCOSE BLD MANUAL STRIP-MCNC: 187 MG/DL (ref 74–99)
GLUCOSE BLD MANUAL STRIP-MCNC: 187 MG/DL (ref 74–99)
GLUCOSE BLD MANUAL STRIP-MCNC: 189 MG/DL (ref 74–99)
GLUCOSE SERPL-MCNC: 165 MG/DL (ref 74–99)
HCT VFR BLD AUTO: 32.7 % (ref 41–52)
HGB BLD-MCNC: 10.8 G/DL (ref 13.5–17.5)
MAGNESIUM SERPL-MCNC: 1.99 MG/DL (ref 1.6–2.4)
MCH RBC QN AUTO: 32.8 PG (ref 26–34)
MCHC RBC AUTO-ENTMCNC: 33 G/DL (ref 32–36)
MCV RBC AUTO: 99 FL (ref 80–100)
NRBC BLD-RTO: 0 /100 WBCS (ref 0–0)
PHOSPHATE SERPL-MCNC: 2.9 MG/DL (ref 2.5–4.9)
PLATELET # BLD AUTO: 158 X10*3/UL (ref 150–450)
POTASSIUM SERPL-SCNC: 4 MMOL/L (ref 3.5–5.3)
PROCALCITONIN SERPL-MCNC: 0.02 NG/ML
RBC # BLD AUTO: 3.29 X10*6/UL (ref 4.5–5.9)
SODIUM SERPL-SCNC: 140 MMOL/L (ref 136–145)
WBC # BLD AUTO: 10.2 X10*3/UL (ref 4.4–11.3)

## 2024-06-30 PROCEDURE — 2500000005 HC RX 250 GENERAL PHARMACY W/O HCPCS

## 2024-06-30 PROCEDURE — 2500000002 HC RX 250 W HCPCS SELF ADMINISTERED DRUGS (ALT 637 FOR MEDICARE OP, ALT 636 FOR OP/ED)

## 2024-06-30 PROCEDURE — 94640 AIRWAY INHALATION TREATMENT: CPT

## 2024-06-30 PROCEDURE — 94660 CPAP INITIATION&MGMT: CPT

## 2024-06-30 PROCEDURE — 2500000001 HC RX 250 WO HCPCS SELF ADMINISTERED DRUGS (ALT 637 FOR MEDICARE OP)

## 2024-06-30 PROCEDURE — 99223 1ST HOSP IP/OBS HIGH 75: CPT | Performed by: INTERNAL MEDICINE

## 2024-06-30 PROCEDURE — 2500000004 HC RX 250 GENERAL PHARMACY W/ HCPCS (ALT 636 FOR OP/ED)

## 2024-06-30 PROCEDURE — 5A0945A ASSISTANCE WITH RESPIRATORY VENTILATION, 24-96 CONSECUTIVE HOURS, HIGH NASAL FLOW/VELOCITY: ICD-10-PCS | Performed by: INTERNAL MEDICINE

## 2024-06-30 PROCEDURE — C9113 INJ PANTOPRAZOLE SODIUM, VIA: HCPCS

## 2024-06-30 PROCEDURE — 94760 N-INVAS EAR/PLS OXIMETRY 1: CPT

## 2024-06-30 PROCEDURE — 31720 CLEARANCE OF AIRWAYS: CPT

## 2024-06-30 PROCEDURE — 36415 COLL VENOUS BLD VENIPUNCTURE: CPT

## 2024-06-30 PROCEDURE — 1200000002 HC GENERAL ROOM WITH TELEMETRY DAILY

## 2024-06-30 PROCEDURE — 99233 SBSQ HOSP IP/OBS HIGH 50: CPT

## 2024-06-30 RX ORDER — ACETAMINOPHEN 650 MG/1
650 SUPPOSITORY RECTAL EVERY 6 HOURS PRN
Status: DISPENSED | OUTPATIENT
Start: 2024-06-30

## 2024-06-30 RX ORDER — ACETAMINOPHEN 325 MG/1
650 TABLET ORAL EVERY 4 HOURS PRN
Status: ACTIVE | OUTPATIENT
Start: 2024-06-30

## 2024-06-30 RX ORDER — INSULIN LISPRO 100 [IU]/ML
0-10 INJECTION, SOLUTION INTRAVENOUS; SUBCUTANEOUS EVERY 4 HOURS
Status: ACTIVE | OUTPATIENT
Start: 2024-06-30

## 2024-06-30 RX ORDER — ACETAMINOPHEN 160 MG/5ML
650 SOLUTION ORAL EVERY 4 HOURS PRN
Status: ACTIVE | OUTPATIENT
Start: 2024-06-30

## 2024-06-30 RX ADMIN — Medication 2000 UNITS: at 09:58

## 2024-06-30 RX ADMIN — IPRATROPIUM BROMIDE AND ALBUTEROL SULFATE 3 ML: 2.5; .5 SOLUTION RESPIRATORY (INHALATION) at 13:59

## 2024-06-30 RX ADMIN — IPRATROPIUM BROMIDE AND ALBUTEROL SULFATE 3 ML: 2.5; .5 SOLUTION RESPIRATORY (INHALATION) at 20:10

## 2024-06-30 RX ADMIN — INSULIN LISPRO 2 UNITS: 100 INJECTION, SOLUTION INTRAVENOUS; SUBCUTANEOUS at 14:22

## 2024-06-30 RX ADMIN — HEPARIN SODIUM 5000 UNITS: 5000 INJECTION INTRAVENOUS; SUBCUTANEOUS at 06:02

## 2024-06-30 RX ADMIN — GABAPENTIN 200 MG: 100 CAPSULE ORAL at 21:35

## 2024-06-30 RX ADMIN — IPRATROPIUM BROMIDE AND ALBUTEROL SULFATE 3 ML: 2.5; .5 SOLUTION RESPIRATORY (INHALATION) at 08:11

## 2024-06-30 RX ADMIN — ATORVASTATIN CALCIUM 80 MG: 80 TABLET, FILM COATED ORAL at 21:35

## 2024-06-30 RX ADMIN — Medication 2000 MCG: at 09:58

## 2024-06-30 RX ADMIN — INSULIN LISPRO 2 UNITS: 100 INJECTION, SOLUTION INTRAVENOUS; SUBCUTANEOUS at 06:14

## 2024-06-30 RX ADMIN — HEPARIN SODIUM 5000 UNITS: 5000 INJECTION INTRAVENOUS; SUBCUTANEOUS at 14:21

## 2024-06-30 RX ADMIN — CLOPIDOGREL 75 MG: 75 TABLET ORAL at 09:58

## 2024-06-30 RX ADMIN — OXYBUTYNIN CHLORIDE 5 MG: 5 TABLET ORAL at 21:35

## 2024-06-30 RX ADMIN — DEXTROSE AND SODIUM CHLORIDE 50 ML/HR: 5; 900 INJECTION, SOLUTION INTRAVENOUS at 10:03

## 2024-06-30 RX ADMIN — Medication 60 L/MIN: at 21:30

## 2024-06-30 RX ADMIN — QUETIAPINE FUMARATE 12.5 MG: 25 TABLET ORAL at 00:15

## 2024-06-30 RX ADMIN — INSULIN LISPRO 2 UNITS: 100 INJECTION, SOLUTION INTRAVENOUS; SUBCUTANEOUS at 22:15

## 2024-06-30 RX ADMIN — ASPIRIN 81 MG 81 MG: 81 TABLET ORAL at 09:57

## 2024-06-30 RX ADMIN — ACETAMINOPHEN 650 MG: 650 SUPPOSITORY RECTAL at 22:23

## 2024-06-30 RX ADMIN — Medication 60 L/MIN: at 23:00

## 2024-06-30 RX ADMIN — LOSARTAN POTASSIUM 100 MG: 50 TABLET, FILM COATED ORAL at 09:59

## 2024-06-30 RX ADMIN — PANTOPRAZOLE SODIUM 40 MG: 40 INJECTION, POWDER, FOR SOLUTION INTRAVENOUS at 06:03

## 2024-06-30 RX ADMIN — INSULIN GLARGINE 16 UNITS: 100 INJECTION, SOLUTION SUBCUTANEOUS at 21:35

## 2024-06-30 RX ADMIN — OXYBUTYNIN CHLORIDE 5 MG: 5 TABLET ORAL at 09:57

## 2024-06-30 RX ADMIN — Medication 40 L/MIN: at 08:47

## 2024-06-30 RX ADMIN — HEPARIN SODIUM 5000 UNITS: 5000 INJECTION INTRAVENOUS; SUBCUTANEOUS at 21:35

## 2024-06-30 RX ADMIN — GABAPENTIN 200 MG: 100 CAPSULE ORAL at 09:58

## 2024-06-30 RX ADMIN — TAMSULOSIN HYDROCHLORIDE 0.4 MG: 0.4 CAPSULE ORAL at 21:35

## 2024-06-30 ASSESSMENT — PAIN SCALES - PAIN ASSESSMENT IN ADVANCED DEMENTIA (PAINAD)
BREATHING: OCCASIONAL LABORED BREATHING, SHORT PERIOD OF HYPERVENTILATION
TOTALSCORE: REPOSITIONED
TOTALSCORE: 1
BODYLANGUAGE: RELAXED
FACIALEXPRESSION: SMILING OR INEXPRESSIVE
CONSOLABILITY: NO NEED TO CONSOLE

## 2024-06-30 ASSESSMENT — COGNITIVE AND FUNCTIONAL STATUS - GENERAL
TURNING FROM BACK TO SIDE WHILE IN FLAT BAD: TOTAL
DAILY ACTIVITIY SCORE: 6
CLIMB 3 TO 5 STEPS WITH RAILING: TOTAL
EATING MEALS: TOTAL
MOVING TO AND FROM BED TO CHAIR: TOTAL
DRESSING REGULAR UPPER BODY CLOTHING: TOTAL
MOVING FROM LYING ON BACK TO SITTING ON SIDE OF FLAT BED WITH BEDRAILS: TOTAL
HELP NEEDED FOR BATHING: TOTAL
WALKING IN HOSPITAL ROOM: TOTAL
TOILETING: TOTAL
MOBILITY SCORE: 6
PERSONAL GROOMING: TOTAL
DRESSING REGULAR LOWER BODY CLOTHING: TOTAL
STANDING UP FROM CHAIR USING ARMS: TOTAL

## 2024-06-30 ASSESSMENT — PAIN - FUNCTIONAL ASSESSMENT: PAIN_FUNCTIONAL_ASSESSMENT: PAINAD (PAIN ASSESSMENT IN ADVANCED DEMENTIA SCALE)

## 2024-06-30 ASSESSMENT — PAIN SCALES - GENERAL: PAINLEVEL_OUTOF10: 0 - NO PAIN

## 2024-06-30 NOTE — PROGRESS NOTES
Internal Medicine - Daily Progress Note   Hospital Day: 3       Name:Kevin Donato, AGE: 84 y.o., GENDER: male, MRN: [id number], ROOM: 250/250-A   CODE STATUS: DNR and No Intubation  Attending Physician: Allan Merlos DO  Resident: Mariusz Anderson MD        Chief Complaint     Chief Complaint   Patient presents with    Stroke        Subjective    Kevin Donato is a 84 y.o. year old male patient on Hospital Day: 3 presenting with Stroke-like symptom.    Overnight events:  - Patient had increased secretion overnight with increased oxygen demands. Patient had continuous suctioning throughout the night. Seen and examined at bedside this morning. Patient was resting on Airvo 40 LPM/ 50% FiO2. Was recently suctioned by the RN.    Meds    Scheduled medications  aspirin, 81 mg, oral, Daily  atorvastatin, 80 mg, oral, Nightly  cholecalciferol, 2,000 Units, oral, q AM  clopidogrel, 75 mg, oral, Daily  cyanocobalamin, 2,000 mcg, oral, q AM  gabapentin, 200 mg, oral, BID  heparin (porcine), 5,000 Units, subcutaneous, q8h CHARISSA  insulin glargine, 16 Units, subcutaneous, q24h  insulin lispro, 0-10 Units, subcutaneous, q4h  ipratropium-albuteroL, 3 mL, nebulization, q6h  losartan, 100 mg, oral, Daily  melatonin, 5 mg, oral, Nightly  oxybutynin, 5 mg, oral, BID  pantoprazole, 40 mg, oral, Daily before breakfast   Or  pantoprazole, 40 mg, intravenous, Daily before breakfast  QUEtiapine, 12.5 mg, oral, Once  tamsulosin, 0.4 mg, oral, Nightly      Continuous medications  D5 % and 0.9 % sodium chloride, 50 mL/hr, Last Rate: 50 mL/hr (06/30/24 1003)      PRN medications  PRN medications: dextrose, dextrose, fluticasone, glucagon, glucagon, ipratropium-albuteroL, oxygen, traMADol     Objective    Physical Exam  Constitutional:       Appearance: He is ill-appearing.   HENT:      Head: Normocephalic and atraumatic.   Cardiovascular:      Heart sounds: No murmur heard.     No friction rub. No gallop.   Pulmonary:      Effort:  "Respiratory distress present.      Breath sounds: Stridor present. Rales present.   Abdominal:      General: Abdomen is flat. Bowel sounds are normal.      Palpations: Abdomen is soft. There is no mass.      Tenderness: There is no abdominal tenderness. There is no guarding.   Musculoskeletal:         General: No tenderness. Normal range of motion.      Right lower leg: Edema present.      Left lower leg: Edema present.   Neurological:      Comments: Resting in bed on airvo   Psychiatric:         Mood and Affect: Mood normal.         Behavior: Behavior normal.        Visit Vitals  /65 (BP Location: Right arm, Patient Position: Lying)   Pulse 67   Temp 37.5 °C (99.5 °F) (Temporal)   Resp 12   Ht 1.778 m (5' 10\")   Wt 95.9 kg (211 lb 6.7 oz)   SpO2 91%   BMI 30.34 kg/m²   Smoking Status Never   BSA 2.18 m²        Intake/Output Summary (Last 24 hours) at 6/30/2024 1039  Last data filed at 6/30/2024 0621  Gross per 24 hour   Intake 1071.9 ml   Output 500 ml   Net 571.9 ml       Labs:   Results from last 72 hours   Lab Units 06/30/24  0536 06/29/24  0618 06/28/24  1115   SODIUM mmol/L 140 139 139   POTASSIUM mmol/L 4.0 4.2 4.2   CHLORIDE mmol/L 108* 106 106   CO2 mmol/L 26 21 27   BUN mg/dL 31* 28* 32*   CREATININE mg/dL 1.44* 1.37* 1.53*   GLUCOSE mg/dL 165* 258* 203*   CALCIUM mg/dL 8.6 8.9 9.1   ANION GAP mmol/L 10 16 10   EGFR mL/min/1.73m*2 47* 50* 44*   PHOSPHORUS mg/dL 2.9 3.8  --       Results from last 72 hours   Lab Units 06/30/24  0536 06/29/24  0618 06/28/24  1115   WBC AUTO x10*3/uL 10.2 14.1* 8.2   HEMOGLOBIN g/dL 10.8* 12.1* 12.0*   HEMATOCRIT % 32.7* 36.4* 34.8*   PLATELETS AUTO x10*3/uL 158 191 168   NEUTROS PCT AUTO %  --   --  65.0   LYMPHS PCT AUTO %  --   --  16.4   MONOS PCT AUTO %  --   --  6.2   EOS PCT AUTO %  --   --  11.3      Lab Results   Component Value Date    CALCIUM 8.6 06/30/2024    PHOS 2.9 06/30/2024      Lab Results   Component Value Date    CRP 0.12 10/18/2017       " "[unfilled]     Micro/ID:   No results found for the last 90 days.                   No lab exists for component: \"AGALPCRNB\"     No results found for: \"URINECULTURE\", \"BLOODCULT\", \"CSFCULTSMEAR\"    Images    CT chest wo IV contrast  Addendum Date: 6/29/2024    Result Date: 6/29/2024  1.  Some secretions/mucous are present in the distal trachea in the right mainstem bronchus, with mucous plugging and bronchial wall thickening noted throughout the airways supplying the right lower lobe. There is also some volume loss with small consolidative opacity present in the right lung base, possibly representing early aspiration pneumonia. Mild mucous plugging with volume loss is also present in the left lung base.   2. No sizable pleural effusion is evident. Subtle ground-glass opacities in the right lung apex may represent component of focal infectious/inflammatory process.   3. Mild-to-moderate coronary artery calcifications.   MACRO: None   Signed by: Blanca Corbin 6/29/2024 8:56 PM Dictation workstation:   [id number]    XR chest 1 view  Result Date: 6/29/2024  Nasogastric tube seen but unfortunately, due to poor penetration of the images the tip is not definitively confirmed 4th anatomic location.   A repeat exam can be performed   Signed by: Collin Juarez 6/29/2024 4:41 PM Dictation workstation:   [id number]    XR chest 1 view  Result Date: 6/29/2024  No acute cardiopulmonary process.   MACRO: None   Signed by: Maria M Mckinnon 6/29/2024 8:16 AM Dictation workstation:   [id number]    MR brain wo IV contrast  Result Date: 6/28/2024  MRI Brain:   Acute small right cerebellar hemisphere ischemic infarcts without significant mass effect. Adjacent remote appearing ischemic infarcts noted as well as an adjacent chronic focus of hemosiderin deposition.   Faint questionable DWI hyperintense foci in the vermis and left cerebellar hemisphere which may represent subacute small ischemic infarcts.   Generalized " parenchymal volume loss and nonspecific white matter changes.   MRA:   Loss of flow related signal in the right vertebral artery with minimal distal reconstitution of the V4 segment, unchanged from prior imaging   There is moderate focal narrowing noted in the distal right M1 segment and proximal right M2 segments, unchanged from prior imaging.   No evidence of new or significant changes intracranial or cervical vascular stenosis/occlusion.   MACRO: None   Signed by: Hilaria Bellamy 6/28/2024 9:40 PM Dictation workstation:   [id number]    MR angio neck wo IV contrast  Result Date: 6/28/2024  MRI Brain:   Acute small right cerebellar hemisphere ischemic infarcts without significant mass effect. Adjacent remote appearing ischemic infarcts noted as well as an adjacent chronic focus of hemosiderin deposition.   Faint questionable DWI hyperintense foci in the vermis and left cerebellar hemisphere which may represent subacute small ischemic infarcts.   Generalized parenchymal volume loss and nonspecific white matter changes.   MRA:   Loss of flow related signal in the right vertebral artery with minimal distal reconstitution of the V4 segment, unchanged from prior imaging   There is moderate focal narrowing noted in the distal right M1 segment and proximal right M2 segments, unchanged from prior imaging.   No evidence of new or significant changes intracranial or cervical vascular stenosis/occlusion.   MACRO: None   Signed by: Hilaria Bellamy 6/28/2024 9:40 PM Dictation workstation:   [id number]    MR angio head wo IV contrast  Result Date: 6/28/2024  MRI Brain:   Acute small right cerebellar hemisphere ischemic infarcts without significant mass effect. Adjacent remote appearing ischemic infarcts noted as well as an adjacent chronic focus of hemosiderin deposition.   Faint questionable DWI hyperintense foci in the vermis and left cerebellar hemisphere which may represent subacute small ischemic infarcts.   Generalized  parenchymal volume loss and nonspecific white matter changes.   MRA:   Loss of flow related signal in the right vertebral artery with minimal distal reconstitution of the V4 segment, unchanged from prior imaging   There is moderate focal narrowing noted in the distal right M1 segment and proximal right M2 segments, unchanged from prior imaging.   No evidence of new or significant changes intracranial or cervical vascular stenosis/occlusion.   MACRO: None   Signed by: Hilaria Bellamy 6/28/2024 9:40 PM Dictation workstation:   [id number]    ECG 12 lead  Result Date: 6/28/2024  Sinus rhythm with Premature atrial complexes Low voltage QRS Borderline ECG When compared with ECG of 27-JAN-2022 10:32, Premature atrial complexes are now Present    XR chest 1 view  Result Date: 6/28/2024  1.  No active cardiopulmonary disease.   Signed by: Hema Lockwood 6/28/2024 11:30 AM Dictation workstation:   [id number]    CT brain attack head wo IV contrast  Result Date: 6/28/2024  Age-related degenerative change, but possibly with component of NPH. Otherwise no acute findings or significant interval change.   MACRO: Hema Lockwood discussed the significance and urgency of this critical finding by secure epic chat with  SEBASTIÁN ROSENBERG on 6/28/2024 at 11:29 am.  (**-RCF-**) Findings:  See findings.   Signed by: Hema Lockwood 6/28/2024 11:30 AM Dictation workstation:   [id number]      Assessment and Plan    Kevin Donato is a 84 y.o. male admitted on 6/28/2024  for right-sided weakness.  Has a past medical history that is significant for IDDM-2, CKD 3, BPH, HTN, HLD, history of previous stroke, GERD, insomnia. He is currently being managed for acute on chronic CVA. Patient continues to have increased secretions. Due to recurrent aspirations patient has been getting recurrent suctioning. Has increased oxygen demands.     ACUTE MEDICAL ISSUES:  # AHRF 2/2 Mucus Plugging form CVA and Aspiration   - Patient seen in ED to have aspirated,  continues to have cough with mucus production. Overnight has had increased oxygen requirements with currently being placed on Airvo  - Imaging: CXR done 6/29 for desatting overnight was stable compared to previous. Repeat CXR s/p NGT placement was inconclusive overnight was d/t poor penetration and thus CT chest was ordered. CT chest with some secretions/mucous are present in the distal trachea in the right mainstem bronchus, with mucous plugging and bronchial wall thickening noted throughout the airways supplying the right lower lobe   [ ] Pro-Gurpreet ordered; pending  - Extensive discussion was done with patient. With new infarct on old, patient continuously having increased secretions and inability to expectorate and cough up mucus is consistent with worsening prognosis. With patient's wished to be DNR-DNI, passing PEG will not be consistent with his wishes and will also not reduce the chances of aspiration  - Consults: Pulmonology consulted, Hospice care consult placed.  - Resp Regimen: Dounebs, IS + Chest vest  - Supportive: Stringent oral care routine (4-6x/day) with suction toothbrush for comfort and to mitigate aspiration risk    # Right Cerebellar and midbrain CVA  - NIH score : 2 at admission with Last know normal at around 9 AM on the morning of admission.  - EKG: NSR with a rate of 66 bpm  - ABCD2 score for TIA: 4 points, moderate risk  - Imaging: CT Head non-contrast with age-related degenerative changes but no acute or significant findings. MRI/ MRA with Acute small right cerebellar hemisphere ischemic infarcts with questionable hyperintense foci in vermis and left cerebellar hemisphere which may be small subacute ischemic infarcts .Loss of flow related signal in the right vertebral artery with minimal distal reconstitution of the V4 segment   - Echo done at previous visit for similar complaint on 05/23/2024 with LVEF 55%, impaired relaxation pattern of LV diastolic filling, AV regurgitation mild, mild  dilatation of ascending aorta  - Tx: Continue home aspirin 81 mg every day, Plavix 75 mg every day, atorvastatin 80 mg every night  - Consults: Neuro on board: continue DAPT, no hx of A. Fib or other cardiac findings to indicated AC. PT/OT consulted; SLP consulted. MBS planned when patient is stable  - Supportive: NPO currently with D5-NS runnig at 75 ml/hr, NGT placed for meds.     CHRONIC MEDICAL ISSUES:  # IDDM-2: Last HbA1c from 05/23/2024 at 8.1. Decrease Lantus from 38 units to 16 units units every 24 hour, switched home sliding scale regimen to moderate SSI #2 (0 to 10 units) Q4Hrs times daily d/t NPO  # CKD 3: Baseline creatinine around 1.4-2 and 1.55.  GFR 40 - 50%.  Likely fluctuating from hemodynamic instability.  Creatinine at admission 1.53 at admission - at baseline. Avoid nephrotoxic medication administration (e.g. NSAID's, aminoglycosides, beta-lactams, quinolones, rifampin, sulfonamides, vancomycin, acyclovir, contrast agents)   # BPH: Continue Myrbetriq 25 mg every day, tamsulosin 0.4 mg every night  # HTN: Continue losartan 100 mg every day  # HLD: Continue home atorvastatin 80 mg every day  # GERD: Continue pantoprazole 40 mg every day  # Diabetic neuropathy: Continue gabapentin 200 mg twice a day  # Insomnia: On melatonin 5 mg every night  # Hypovitaminosis: Continue home vitamin D3 2000 units every day, vitamin B12 2000 mcg every day    Fluids: Continuous D5-NS @ 75cc/hr  Electrolytes: Replete prn  Nutrition: NPO with NGT in place  Antimicrobials: None  DVT ppx: Heparin 5000 units every 8 hours   GI ppx: Pantoprazole 40 mg every day   Catheter: External urinary catheter  Lines: 20G PIV in left upper anterior arm  Supplemental Oxygen: Airbvo 40/50  Emergency Contact: Extended Emergency Contact Information  Primary Emergency Contact: Robina Donato  Home Phone: [phone]  Work Phone: [phone]  Relation: Spouse   Code: DNR and No Intubation     Disposition: Dispo pending Hospice  meeting     Mariusz Anderson MD  Internal Medicine, PGY- 1  06/30/24 at 10:39 AM     Disclaimer: Documentation completed with the information available at the time of input. The times in the chart may not be reflective of actual patient care times, interventions, or procedures. Documentation occurs after the physical care of the patient. This note was dictated by speech recognition. Minor errors in transcription may be present

## 2024-06-30 NOTE — CARE PLAN
The patient's goals for the shift include      The clinical goals for the shift include Pt will remain HDS this shift.    Over the shift, the patient did make progress toward the following goals. VS stable, pt remained safe, monitored and medicated as ordered this shift.

## 2024-06-30 NOTE — NURSING NOTE
1930 Assumed care . Pt pulling at airvo and ng tube     2015 order obtained for mitts      2148 ok to use to ng tube per Dr. Marks     2228 pt pulling off mitts and pulling on airvo and ng tube . Order for soft wrist restraints obtained

## 2024-06-30 NOTE — CONSULTS
"    Department of Medicine  Division of Pulmonary, Critical Care, and Sleep Medicine  Location  [facility]    Reason for consult: \"AHRF 2/2 secretions and mucous throughout the trachea.\"  Requesting physician: Mariusz Anderson MD    Physician HPI (6/30/2024):  84 y.o. male admitted on 6/28/2024 10:55 AM for stroke. He has a past history of previous strokes, CKD stage III, GERD, hypertension.  His most recent stroke was in May 2024.  He has been taking DAPT and high intensity statin therapy at home.  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  He has been having difficulties managing secretions and has had increasing hypoxia over the past 24 hours.  He was transferred to stepdown earlier yesterday due to increasing O2 requirements.  Currently he is on heated high flow nasal cannula at 40 L/min flow rate with 50% FiO2.  Patient is DNR and DNI.    History from patient is limited due to his significant dysarthria and inability to manage secretions.  He is also somewhat somnolent.  His wife is at bedside and is able to provide further history.  Pulmonology consultation was requested for evaluation of utility of bronchoscopy.    Patient has been  to his wife for 61 years.  She states that his wishes are to go home, but does not believe that he would like to have his suffering prolonged. Due to the location of his most recent stroke, ongoing aspiration is a concern.  His CT chest from yesterday already demonstrated signs of aspiration in the right lower lobe.  I explained to patient's wife that for patients with strokes or other neurological deficits that at difficulties managing secretions, definitive therapy would be tracheostomy placement which would facilitate frequent continuous need of suctioning.  She does not believe that would be in line with patient's wishes.  We also discussed possibility of bronchoscopy. Palliative care has already been " consulted.    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/28/2019    Iron deficiency anemia due to chronic blood loss    Otalgia, right ear 04/28/2016    Right ear pain    Other cholangitis (CMS-HCC) 02/14/2022    Ascending cholangitis    Other iron deficiency anemias 12/16/2016    Other iron deficiency anemia    Other skin changes 10/13/2017    Vesicular rash    Pain in right shoulder 10/07/2016    Right shoulder pain    Parkinson's disease (Multi) 03/23/2020    Parkinsonism    Personal history of diseases of the skin and subcutaneous tissue 02/28/2019    History of urticaria    Personal history of other diseases of the circulatory system     History of hypertension    Personal history of other diseases of the digestive system 08/10/2018    History of pancreatitis    Personal history of other diseases of the musculoskeletal system and connective tissue 02/26/2020    History of polymyalgia rheumatica    Personal history of other diseases of the nervous system and sense organs 05/21/2018    History of hydrocephalus    Personal history of other diseases of the nervous system and sense organs 04/07/2015    History of eustachian tube dysfunction    Personal history of other diseases of the respiratory system 01/10/2019    History of sinusitis    Personal history of other diseases of urinary system 11/12/2014    History of hematuria    Personal history of other specified conditions 10/16/2017    History of edema    Personal history of other specified conditions 01/11/2022    History of jaundice    Type 2 diabetes mellitus without complications (Multi) 11/29/2022    Diabetes  mellitus       PSH:  Past Surgical History:   Procedure Laterality Date    HERNIA REPAIR  02/14/2017    Hernia Repair    NOSE SURGERY  02/14/2017    Nose Surgery    OTHER SURGICAL HISTORY  05/24/2022    Cholecystectomy       FHx:  Family History   Problem Relation Name Age of Onset    Diabetes Mother      Colon cancer Sister      Leukemia Sister         Social Hx:  Social History     Socioeconomic History    Marital status:      Spouse name: None    Number of children: None    Years of education: None    Highest education level: None   Occupational History    None   Tobacco Use    Smoking status: Never    Smokeless tobacco: Never   Substance and Sexual Activity    Alcohol use: Never    Drug use: Never    Sexual activity: None   Other Topics Concern    None   Social History Narrative    None     Social Determinants of Health     Financial Resource Strain: Low Risk  (6/28/2024)    Overall Financial Resource Strain (CARDIA)     Difficulty of Paying Living Expenses: Not hard at all   Food Insecurity: No Food Insecurity (5/23/2024)    Hunger Vital Sign     Worried About Running Out of Food in the Last Year: Never true     Ran Out of Food in the Last Year: Never true   Transportation Needs: No Transportation Needs (6/28/2024)    PRAPARE - Transportation     Lack of Transportation (Medical): No     Lack of Transportation (Non-Medical): No   Physical Activity: Inactive (5/23/2024)    Exercise Vital Sign     Days of Exercise per Week: 0 days     Minutes of Exercise per Session: 0 min   Stress: No Stress Concern Present (5/23/2024)    [employer] [place] [employer] - Occupational Stress Questionnaire     Feeling of Stress : Not at all   Social Connections: Moderately Isolated (5/23/2024)    Social Connection and Isolation Panel [NHANES]     Frequency of Communication with Friends and Family: More than three times a week     Frequency of Social Gatherings with Friends and Family: Three times a week     Attends  Christianity Services: Never     Active Member of Clubs or Organizations: No     Attends Club or Organization Meetings: Never     Marital Status:    Intimate Partner Violence: Not At Risk (5/23/2024)    Humiliation, Afraid, Rape, and Kick questionnaire     Fear of Current or Ex-Partner: No     Emotionally Abused: No     Physically Abused: No     Sexually Abused: No   Housing Stability: Low Risk  (6/28/2024)    Housing Stability Vital Sign     Unable to Pay for Housing in the Last Year: No     Number of Places Lived in the Last Year: 1     Unstable Housing in the Last Year: No       Immunization History:  Immunization History   Administered Date(s) Administered    Flu vaccine, quadrivalent, high-dose, preservative free, age 65y+ (FLUZONE) 09/15/2020    Influenza, High Dose Seasonal, Preservative Free 10/03/2017    Influenza, Seasonal, Quadrivalent, Adjuvanted 10/07/2016, 09/28/2018, 10/06/2022    Influenza, seasonal, injectable 10/04/2019, 10/11/2021    Influenza, seasonal, injectable, preservative free 10/11/2013, 09/30/2014, 10/02/2015    Influenza, trivalent, adjuvanted 10/08/2018    Pfizer COVID-19 vaccine, Fall 2023, 12 years and older, (30mcg/0.3mL) 10/19/2023    Pfizer COVID-19 vaccine, bivalent, age 12 years and older (30 mcg/0.3 mL) 09/14/2022    Pfizer Purple Cap SARS-CoV-2 01/28/2021, 02/17/2021, 09/25/2021    Pneumococcal conjugate vaccine, 13-valent (PREVNAR 13) 03/06/2019    Pneumococcal polysaccharide vaccine, 23-valent, age 2 years and older (PNEUMOVAX 23) 11/11/2011    Td vaccine, age 7 years and older (TDVAX) 03/09/2016    Zoster, Unspecified 05/17/2018, 09/28/2018    Zoster, live 07/27/2018       Current Medications:  Scheduled medications  aspirin, 81 mg, oral, Daily  atorvastatin, 80 mg, oral, Nightly  cholecalciferol, 2,000 Units, oral, q AM  clopidogrel, 75 mg, oral, Daily  cyanocobalamin, 2,000 mcg, oral, q AM  gabapentin, 200 mg, oral, BID  heparin (porcine), 5,000 Units, subcutaneous,  q8h CHARISSA  insulin glargine, 16 Units, subcutaneous, q24h  insulin lispro, 0-10 Units, subcutaneous, q4h  ipratropium-albuteroL, 3 mL, nebulization, q6h  losartan, 100 mg, oral, Daily  melatonin, 5 mg, oral, Nightly  oxybutynin, 5 mg, oral, BID  pantoprazole, 40 mg, oral, Daily before breakfast   Or  pantoprazole, 40 mg, intravenous, Daily before breakfast  QUEtiapine, 12.5 mg, oral, Once  tamsulosin, 0.4 mg, oral, Nightly      Continuous medications  D5 % and 0.9 % sodium chloride, 50 mL/hr, Last Rate: 50 mL/hr (06/30/24 1003)      PRN medications  PRN medications: dextrose, dextrose, fluticasone, glucagon, glucagon, ipratropium-albuteroL, oxygen, traMADol     Drug Allergies/Intolerances:  Allergies   Allergen Reactions    Lisinopril Hives and Unknown        Review of Systems:  Review of Systems   Unable to perform ROS: Acuity of condition        All other review of systems are negative and/or non-contributory.    Physical Examination:      6/29/2024    12:24 AM 6/29/2024     5:03 AM 6/29/2024    10:00 AM 6/29/2024    11:48 AM 6/29/2024     9:15 PM 6/30/2024     1:00 AM 6/30/2024     5:56 AM   Vitals   Systolic 171 134 133 153   130   Diastolic 71 49 65 75   65   Heart Rate 78 90 67       Temp 36.3 °C (97.3 °F) 36.3 °C (97.3 °F) 36.3 °C (97.3 °F) 37.2 °C (99 °F) 37.4 °C (99.3 °F) 36.9 °C (98.4 °F) 37.5 °C (99.5 °F)   Resp 17 20 18    12         GEN: elderly man, somewhat somnolent, gurgling secretions easily audible  ENT: NG tube in left nares, wearing high flow nasal cannula  CV: RRR, no m/g/r  LUNGS: poor effort, transmitted breath sound and rhonchi bilaterally  ABD: Soft, nontender  EXT: no edema, cyanosis, clubbing      Pulmonary Function Test Results     None    Exacerbation History     N/A    Imaging     CT chest 6/29/2024:  1.  Some secretions/mucous are present in the distal trachea in the  right mainstem bronchus, with mucous plugging and bronchial wall  thickening noted throughout the airways supplying the  right lower  lobe. There is also some volume loss with small consolidative opacity  present in the right lung base, possibly representing early  aspiration pneumonia. Mild mucous plugging with volume loss is also  present in the left lung base.  2. No sizable pleural effusion is evident. Subtle ground-glass  opacities in the right lung apex may represent component of focal  infectious/inflammatory process.    Bronchoscopy     None    Labs     Results for orders placed or performed during the hospital encounter of 06/28/24 (from the past 24 hour(s))   POCT GLUCOSE   Result Value Ref Range    POCT Glucose 252 (H) 74 - 99 mg/dL   POCT GLUCOSE   Result Value Ref Range    POCT Glucose 150 (H) 74 - 99 mg/dL   POCT GLUCOSE   Result Value Ref Range    POCT Glucose 154 (H) 74 - 99 mg/dL   CBC   Result Value Ref Range    WBC 10.2 4.4 - 11.3 x10*3/uL    nRBC 0.0 0.0 - 0.0 /100 WBCs    RBC 3.29 (L) 4.50 - 5.90 x10*6/uL    Hemoglobin 10.8 (L) 13.5 - 17.5 g/dL    Hematocrit 32.7 (L) 41.0 - 52.0 %    MCV 99 80 - 100 fL    MCH 32.8 26.0 - 34.0 pg    MCHC 33.0 32.0 - 36.0 g/dL    RDW 12.6 11.5 - 14.5 %    Platelets 158 150 - 450 x10*3/uL   Renal Function Panel   Result Value Ref Range    Glucose 165 (H) 74 - 99 mg/dL    Sodium 140 136 - 145 mmol/L    Potassium 4.0 3.5 - 5.3 mmol/L    Chloride 108 (H) 98 - 107 mmol/L    Bicarbonate 26 21 - 32 mmol/L    Anion Gap 10 10 - 20 mmol/L    Urea Nitrogen 31 (H) 6 - 23 mg/dL    Creatinine 1.44 (H) 0.50 - 1.30 mg/dL    eGFR 47 (L) >60 mL/min/1.73m*2    Calcium 8.6 8.6 - 10.3 mg/dL    Phosphorus 2.9 2.5 - 4.9 mg/dL    Albumin 3.5 3.4 - 5.0 g/dL   Magnesium   Result Value Ref Range    Magnesium 1.99 1.60 - 2.40 mg/dL   POCT GLUCOSE   Result Value Ref Range    POCT Glucose 173 (H) 74 - 99 mg/dL   POCT GLUCOSE   Result Value Ref Range    POCT Glucose 172 (H) 74 - 99 mg/dL   POCT GLUCOSE   Result Value Ref Range    POCT Glucose 156 (H) 74 - 99 mg/dL         Echocardiogram     5/24/2024:  1. Left  ventricular systolic function is normal with a 55% estimated ejection fraction.   2. Spectral Doppler shows an impaired relaxation pattern of left ventricular diastolic filling.   3. Mild aortic valve regurgitation.   4. There is mild dilatation of the ascending aorta.     ASSESSMENT & PLAN     Summary:  84 y.o. male admitted on 6/28/2024 10:55 AM for midbrain stroke, but now with acute hypoxic respiratory failure secondary to continuous aspiration. Had discussion about risks/benefits/alternatives of secretion management including bronchoscopy, mechanical ventilation, and tracheostomy. I explained that bronchoscopy is not without its risks given his age and high O2 requirements. Bronchoscopy would only be a temporizing measure to clear secretions/mucus plugs which would likely recur within hours as he has minimal ability to protect his airway. Tracheostomy is against patient's wishes.     Problem list:  Patient Active Problem List   Diagnosis    Abnormal kidney function    Allergic rhinitis    Arthritis    Benign essential hypertension    Ataxic gait    CKD stage 3 secondary to diabetes (Multi)    Chronic left shoulder pain    Bone disorder    Chronic bilateral low back pain without sciatica    Diabetes mellitus (Multi)    Diabetes mellitus with diabetic neuropathy (Multi)    Eczema    Elevated PSA    Enlarged prostate with lower urinary tract symptoms (LUTS)    GERD (gastroesophageal reflux disease)    Hip pain, bilateral    Hyperlipidemia    Nocturia    Vitamin B12 deficiency    Vitamin D deficiency    Post-void dribbling    Secondary diabetes mellitus with stage 3 chronic kidney disease (GFR 30-59) (Multi)    Insulin-requiring or dependent type II diabetes mellitus (Multi)    Polymyalgia rheumatica (Multi)    Acute pancreatitis with uninfected necrosis, unspecified ([facility]-HCC)    Ataxia, unspecified    Durable power of  for healthcare exists but copy not available    Cerebrovascular accident (CVA) (Multi)     OAB (overactive bladder)    Stroke-like symptom        Recommendations:  -Bronchoscopy would only be a temporary fix to his aspiration which would continue to recur given his stroke, and tracheostomy is not in line with patient's wishes  -Patient's wife is considering transitioning toward comfort care which is reasonable  -Will therefore not be offering bronchoscopy.   -Agree with palliative care involvement  -Can continue on HHFNC to maintain SpO2 90-92% until ultimate decision about continuation/withdrawal of care is made  -Would be happy discuss further with other family members should more questions arise, otherwise will sign off    Margot Hinton DO  Staff Physician - Pulmonary & Critical Care  06/30/24 10:08 AM

## 2024-06-30 NOTE — PROGRESS NOTES
Occupational Therapy                 Therapy Communication Note    Patient Name: Kevin Donato  MRN: [id number]  Today's Date: 6/30/2024     Discipline: Occupational Therapy    Missed Visit Reason: Missed Visit Reason: Patient placed on medical hold (RN deferred due to pt not appropriate for OT evaluation on this date. No OT evaluation completed at this time)    Missed Time: Attempt

## 2024-06-30 NOTE — PROGRESS NOTES
Physical Therapy                 Therapy Communication Note    Patient Name: Kevin Donato  MRN: [id number]  Today's Date: 6/30/2024     Discipline: Physical Therapy    Missed Visit Reason: Missed Visit Reason: Patient in a medical procedure (RN deferred PT from therapy 2' to Pt not being appropriate to participate with therapy. No evaluation)    Missed Time: Attempt    Comment:

## 2024-07-01 VITALS
OXYGEN SATURATION: 98 % | HEIGHT: 70 IN | BODY MASS INDEX: 30.27 KG/M2 | WEIGHT: 211.42 LBS | HEART RATE: 70 BPM | RESPIRATION RATE: 25 BRPM | TEMPERATURE: 97.3 F | SYSTOLIC BLOOD PRESSURE: 100 MMHG | DIASTOLIC BLOOD PRESSURE: 45 MMHG

## 2024-07-01 LAB
ALBUMIN SERPL BCP-MCNC: 3.1 G/DL (ref 3.4–5)
ANION GAP SERPL CALC-SCNC: 11 MMOL/L (ref 10–20)
BUN SERPL-MCNC: 39 MG/DL (ref 6–23)
CALCIUM SERPL-MCNC: 8 MG/DL (ref 8.6–10.3)
CHLORIDE SERPL-SCNC: 110 MMOL/L (ref 98–107)
CO2 SERPL-SCNC: 27 MMOL/L (ref 21–32)
CREAT SERPL-MCNC: 1.97 MG/DL (ref 0.5–1.3)
EGFRCR SERPLBLD CKD-EPI 2021: 32 ML/MIN/1.73M*2
ERYTHROCYTE [DISTWIDTH] IN BLOOD BY AUTOMATED COUNT: 12.5 % (ref 11.5–14.5)
GLUCOSE BLD MANUAL STRIP-MCNC: 162 MG/DL (ref 74–99)
GLUCOSE BLD MANUAL STRIP-MCNC: 186 MG/DL (ref 74–99)
GLUCOSE SERPL-MCNC: 169 MG/DL (ref 74–99)
HCT VFR BLD AUTO: 31.6 % (ref 41–52)
HGB BLD-MCNC: 10.2 G/DL (ref 13.5–17.5)
MAGNESIUM SERPL-MCNC: 2.08 MG/DL (ref 1.6–2.4)
MCH RBC QN AUTO: 33.4 PG (ref 26–34)
MCHC RBC AUTO-ENTMCNC: 32.3 G/DL (ref 32–36)
MCV RBC AUTO: 104 FL (ref 80–100)
NRBC BLD-RTO: 0 /100 WBCS (ref 0–0)
PHOSPHATE SERPL-MCNC: 4.3 MG/DL (ref 2.5–4.9)
PLATELET # BLD AUTO: 146 X10*3/UL (ref 150–450)
POTASSIUM SERPL-SCNC: 4.5 MMOL/L (ref 3.5–5.3)
RBC # BLD AUTO: 3.05 X10*6/UL (ref 4.5–5.9)
SODIUM SERPL-SCNC: 143 MMOL/L (ref 136–145)
WBC # BLD AUTO: 11.7 X10*3/UL (ref 4.4–11.3)

## 2024-07-01 PROCEDURE — 2500000005 HC RX 250 GENERAL PHARMACY W/O HCPCS: Performed by: INTERNAL MEDICINE

## 2024-07-01 PROCEDURE — 94760 N-INVAS EAR/PLS OXIMETRY 1: CPT

## 2024-07-01 PROCEDURE — 2500000004 HC RX 250 GENERAL PHARMACY W/ HCPCS (ALT 636 FOR OP/ED)

## 2024-07-01 PROCEDURE — 36415 COLL VENOUS BLD VENIPUNCTURE: CPT

## 2024-07-01 PROCEDURE — 94640 AIRWAY INHALATION TREATMENT: CPT

## 2024-07-01 PROCEDURE — 99231 SBSQ HOSP IP/OBS SF/LOW 25: CPT | Performed by: INTERNAL MEDICINE

## 2024-07-01 PROCEDURE — 2500000005 HC RX 250 GENERAL PHARMACY W/O HCPCS

## 2024-07-01 PROCEDURE — 2500000002 HC RX 250 W HCPCS SELF ADMINISTERED DRUGS (ALT 637 FOR MEDICARE OP, ALT 636 FOR OP/ED)

## 2024-07-01 PROCEDURE — C9113 INJ PANTOPRAZOLE SODIUM, VIA: HCPCS

## 2024-07-01 RX ADMIN — SODIUM CHLORIDE, POTASSIUM CHLORIDE, SODIUM LACTATE AND CALCIUM CHLORIDE 500 ML: 600; 310; 30; 20 INJECTION, SOLUTION INTRAVENOUS at 00:13

## 2024-07-01 RX ADMIN — INSULIN LISPRO 2 UNITS: 100 INJECTION, SOLUTION INTRAVENOUS; SUBCUTANEOUS at 02:20

## 2024-07-01 RX ADMIN — SODIUM CHLORIDE, POTASSIUM CHLORIDE, SODIUM LACTATE AND CALCIUM CHLORIDE 500 ML: 600; 310; 30; 20 INJECTION, SOLUTION INTRAVENOUS at 03:28

## 2024-07-01 RX ADMIN — Medication 60 L/MIN: at 03:15

## 2024-07-01 RX ADMIN — PANTOPRAZOLE SODIUM 40 MG: 40 INJECTION, POWDER, FOR SOLUTION INTRAVENOUS at 06:12

## 2024-07-01 RX ADMIN — IPRATROPIUM BROMIDE AND ALBUTEROL SULFATE 3 ML: 2.5; .5 SOLUTION RESPIRATORY (INHALATION) at 08:14

## 2024-07-01 RX ADMIN — HEPARIN SODIUM 5000 UNITS: 5000 INJECTION INTRAVENOUS; SUBCUTANEOUS at 06:12

## 2024-07-01 RX ADMIN — CARBOXYMETHYLCELLULOSE SODIUM 1 DROP: 5 SOLUTION/ DROPS OPHTHALMIC at 06:36

## 2024-07-01 RX ADMIN — INSULIN LISPRO 2 UNITS: 100 INJECTION, SOLUTION INTRAVENOUS; SUBCUTANEOUS at 06:05

## 2024-07-01 ASSESSMENT — COGNITIVE AND FUNCTIONAL STATUS - GENERAL
STANDING UP FROM CHAIR USING ARMS: TOTAL
DRESSING REGULAR UPPER BODY CLOTHING: TOTAL
MOVING TO AND FROM BED TO CHAIR: TOTAL
DAILY ACTIVITIY SCORE: 8
HELP NEEDED FOR BATHING: TOTAL
MOVING FROM LYING ON BACK TO SITTING ON SIDE OF FLAT BED WITH BEDRAILS: TOTAL
CLIMB 3 TO 5 STEPS WITH RAILING: A LOT
PERSONAL GROOMING: A LOT
WALKING IN HOSPITAL ROOM: TOTAL
EATING MEALS: A LOT
DRESSING REGULAR LOWER BODY CLOTHING: TOTAL
MOBILITY SCORE: 7
TOILETING: TOTAL
TURNING FROM BACK TO SIDE WHILE IN FLAT BAD: TOTAL

## 2024-07-01 ASSESSMENT — PAIN SCALES - GENERAL: PAINLEVEL_OUTOF10: 0 - NO PAIN

## 2024-07-01 NOTE — NURSING NOTE
1930 Assumed care. Pt resting quietly in bed with wife at bedside    2100 pt very lethargic , not able to arouse     2200 Dr Lewis at bedside , will hold lyrica and monitor patient status . Ng tube clogged after med administration. Dr Lewis aware ,will address with day team. Pt with 38.1 temp  tylenol ordered    0000 fever resolved , pt bp is 102/38 Dr Lewis aware 500l bolus of lr ordered and recheck in one hour    0100 bp 105/36 after bolus, recheck in two hours     0300 bp 98/37 , 500 l bolus of lr ordered

## 2024-07-01 NOTE — SIGNIFICANT EVENT
Called to see patient for unresponsiveness. On exam the patient did not respond to verbal or physical stimuli. Absent heart and breath sounds for one minute, no response to noxious stimuli, and absent corneal reflex,  Absent peripheral pulses. Pupils are fixed and dilated. Patient pronounced dead at 16:29 Dr. Merlos (attending) notified. Next of kin/family present at bedside.

## 2024-07-01 NOTE — DISCHARGE SUMMARY
"Discharge Diagnosis  GIP/hospice   Cardiopulmonary arrest     Issues Requiring Follow-Up  N/A    Discharge Meds     Your medication list        CONTINUE taking these medications        Instructions Last Dose Given Next Dose Due   lancets 26 gauge misc           Sure Comfort Pen Needle 32 gauge x 5/32\" needle  Generic drug: pen needle, diabetic      Take as directed. Use daily for lantus and 3times a day for humalog coverage              STOP taking these medications      acetaminophen 325 mg tablet  Commonly known as: Tylenol        aspirin 81 mg chewable tablet        atorvastatin 80 mg tablet  Commonly known as: Lipitor        cholecalciferol 50 mcg (2,000 unit) capsule  Commonly known as: Vitamin D-3        clopidogrel 75 mg tablet  Commonly known as: Plavix        Contour Test Strips strip  Generic drug: blood sugar diagnostic        cyanocobalamin (vitamin B-12) 1,000 mcg tablet extended release  Commonly known as: Vitamin B-12        fluticasone 50 mcg/actuation nasal spray  Commonly known as: Flonase        gabapentin 100 mg capsule  Commonly known as: Neurontin        insulin lispro 100 unit/mL injection  Commonly known as: HumaLOG KwikPen Insulin        Lantus Solostar U-100 Insulin 100 unit/mL (3 mL) pen  Generic drug: insulin glargine        losartan 100 mg tablet  Commonly known as: Cozaar        mirabegron 25 mg tablet extended release 24 hr 24 hr tablet  Commonly known as: Myrbetriq        multivitamin tablet        omega 3-dha-epa-fish oil 1,200 (144-216) mg capsule        omeprazole 20 mg DR capsule  Commonly known as: PriLOSEC        tamsulosin 0.4 mg 24 hr capsule  Commonly known as: Flomax        traMADol 25 mg split tablet  Commonly known as: Ultram        triamcinolone 0.1 % cream  Commonly known as: Kenalog        True Metrix Glucose Test Strip strip  Generic drug: blood sugar diagnostic                 Test Results Pending At Discharge  Pending Labs       No current pending labs.      "       Hospital Course  Patient admitted as GIP hospice here at [facility] after right nodularity and cerebellar stroke complicated by acute hypoxic respiratory failure, aspiration pneumonia.  Patient was placed on end-of-life order set  for comfort care only.  Patient  at 4:29 PM on 2024 secondary to cardiopulmonary arrest.    Pertinent Physical Exam At Time of Discharge  Patient . Neuro reflex absent including fixed dilated pupils, no corneal or gag reflex. No response to noxious stimuli.  Absent peripheral pulses and respiratory drive. No heart sounds auscultated.  Time of death pronounced [id number].     Outpatient Follow-Up  Future Appointments   Date Time Provider Department Center   2024 11:20 AM DO Edy [id number] [facility]   2024 11:40 AM Juan C Denney MD [id number] [facility]   2024 12:20 PM DO Edy [id number] [facility]         Cesario Crowe DO

## 2024-07-01 NOTE — PROGRESS NOTES
Palliative Care Social Work Note     HWR meeting today at 1030.  Met with pt and spouse.  Pt currently on airvo and receiving a breathing treatment.  Spouse has not left bedside.  Reviewed HWR meeting, GIP, de escalating care, turning off monitors.  Spouse states comfort is their priority.  Spouse would like pt to remain at [facility].  Dtr arrived, reviewed POC.  Palliative cart ordered.     1030 Met with pt, spouse, and children.  Family has met with HWR.  Support provided, benefits of hospice reviewed.  Nell Liao provided education.  Family signed with hospice, pt to GIP.  Family declined music therapy and spiritual care.    Support provided throughout the day.

## 2024-07-01 NOTE — CARE PLAN
Problem: Skin  Goal: Prevent/manage excess moisture  7/1/2024 1039 by Елена Chiu RN  Outcome: Progressing  7/1/2024 1029 by Елена Chiu RN  Outcome: Progressing  Goal: Prevent/minimize sheer/friction injuries  7/1/2024 1039 by Елена Chiu RN  Outcome: Progressing  7/1/2024 1029 by Елена Chiu RN  Outcome: Progressing   The patient's goals for the shift include      The clinical goals for the shift include pt will move to hospice care by end of shift

## 2024-07-01 NOTE — NURSING NOTE
1630 Pt. Stopped breathing and pulsed stopped. Notified physician to pronounce pt. Family at bedside. Ring and glasses given to pt's. family

## 2024-07-01 NOTE — CONSULTS
Nutrition Assessment Note  Nutrition Assessment      Reason for Assessment  Reason for Assessment: Admission nursing screening (MST=2, unsure of weight loss)    History:  Food and Nutrient History  Food and Nutrient History: Noted pt unresponsive, hospice meeting pending. Will defer intervention at this time. Re-consult dietitian if there is a change in patient status.

## 2024-07-01 NOTE — H&P
History Of Present Illness  Kevin Donato is a 84 y.o. male admitted hospice [facility] having been discharged from prior hospital encounter.  See attestation and plan for plan and recs.  Patient nonverbal at this time not following any commands.  Family at bedside updated and questions answered. Emotional support provided.      Past Medical History  Past Medical History:   Diagnosis Date    Calculus of bile duct without cholangitis or cholecystitis without obstruction 02/21/2022    Choledocholithiasis    Fever, unspecified 01/10/2022    Fever and chills    Generalized abdominal pain 01/10/2022    Generalized abdominal pain    Hiccough 08/31/2022    Intractable hiccups    Iron deficiency anemia secondary to blood loss (chronic) 02/28/2019    Iron deficiency anemia due to chronic blood loss    Otalgia, right ear 04/28/2016    Right ear pain    Other cholangitis (CMS-HCC) 02/14/2022    Ascending cholangitis    Other iron deficiency anemias 12/16/2016    Other iron deficiency anemia    Other skin changes 10/13/2017    Vesicular rash    Pain in right shoulder 10/07/2016    Right shoulder pain    Parkinson's disease (Multi) 03/23/2020    Parkinsonism    Personal history of diseases of the skin and subcutaneous tissue 02/28/2019    History of urticaria    Personal history of other diseases of the circulatory system     History of hypertension    Personal history of other diseases of the digestive system 08/10/2018    History of pancreatitis    Personal history of other diseases of the musculoskeletal system and connective tissue 02/26/2020    History of polymyalgia rheumatica    Personal history of other diseases of the nervous system and sense organs 05/21/2018    History of hydrocephalus    Personal history of other diseases of the nervous system and sense organs 04/07/2015    History of eustachian tube dysfunction    Personal history of other diseases of the respiratory system 01/10/2019    History of sinusitis     "Personal history of other diseases of urinary system 11/12/2014    History of hematuria    Personal history of other specified conditions 10/16/2017    History of edema    Personal history of other specified conditions 01/11/2022    History of jaundice    Type 2 diabetes mellitus without complications (Multi) 11/29/2022    Diabetes mellitus       Surgical History  Past Surgical History:   Procedure Laterality Date    HERNIA REPAIR  02/14/2017    Hernia Repair    NOSE SURGERY  02/14/2017    Nose Surgery    OTHER SURGICAL HISTORY  05/24/2022    Cholecystectomy        Social History  He reports that he has never smoked. He has never used smokeless tobacco. He reports that he does not drink alcohol and does not use drugs.    Family History  Family History   Problem Relation Name Age of Onset    Diabetes Mother      Colon cancer Sister      Leukemia Sister          Allergies  Lisinopril    Review of Systems   Psychiatric/Behavioral:  Positive for confusion.          Physical Exam  Constitutional: Patient unresponsive, nonverbal. Not following any commands  Cardiovascular: Regular rate and rhythm, S1, S2. No extra heart sounds or murmurs  Respiratory: Shallow slow breathing  Abdomen: NG tube was in this morning (plan to remove). Soft abdomen  Neuromuscular: Not following any commands. Cannot test sensation.      Last Recorded Vitals  Blood pressure 137/76, pulse 66, temperature 36.6 °C (97.9 °F), temperature source Temporal, resp. rate 20, height 1.791 m (5' 10.5\"), weight 95.7 kg (211 lb), SpO2 95%.    Relevant Results      Acute Medical Problems in Hospital   # Right medullary and cerebellar CVA  # Aspiration  # Aspiration pneumonia  # Acute hypoxic respiratory failure  # Mucus plugging  # ROSALBA  # Dehydration  # Acute metabolic encephalopaty  # Goals of care discussion        Plan:  Patient re-admitted hospice GIP here.  Goal is for comfort care. Medications in place as recommended by Hospice RN.  Based on patient status " currently this AM, suspect patient will pass away within 1-2 days.   Emotional support provided to patient's family.     For clarification, I will not bill for a discharge or readmit H&P today as I will continue to manage patient in new GIP encounter. Will bill a daily progress note      Allan Merlos,

## 2024-07-01 NOTE — HOSPITAL COURSE
Patient admitted as GIP hospice here at [facility] after right nodularity and cerebellar stroke complicated by acute hypoxic respiratory failure, aspiration pneumonia.  Patient was placed on end-of-life order set  for comfort care only.  Patient  at 4:29 PM on 2024 secondary to cardiopulmonary arrest.

## 2024-07-01 NOTE — CARE PLAN
The patient's goals for the shift include    To remain comfortable  The clinical goals for the shift include Pt will show no signs of respiratory distress during this shift.

## 2024-07-01 NOTE — PROGRESS NOTES
Speech-Language Pathology                 Therapy Communication Note    Patient Name: Kevin Donato  MRN: [id number]  Today's Date: 7/1/2024     Discipline: Speech Language Pathology    Missed Visit Reason:  Not appropriate    Missed Time: Attempt    Comment: Pt had a change in status, and is not responsive at this time per RN. Hospice meeting pending. Hold SLP at this time.

## 2024-07-01 NOTE — CARE PLAN
Problem: Skin  Goal: Prevent/manage excess moisture  Outcome: Progressing  Goal: Prevent/minimize sheer/friction injuries  Outcome: Progressing     Problem: Fall/Injury  Goal: Not fall by end of shift  Outcome: Progressing  Goal: Be free from injury by end of the shift  Outcome: Progressing  Goal: Verbalize understanding of personal risk factors for fall in the hospital  Outcome: Progressing  Goal: Verbalize understanding of risk factor reduction measures to prevent injury from fall in the home  Outcome: Progressing  Goal: Use assistive devices by end of the shift  Outcome: Progressing  Goal: Pace activities to prevent fatigue by end of the shift  Outcome: Progressing     Problem: Pain - Adult  Goal: Verbalizes/displays adequate comfort level or baseline comfort level  Outcome: Progressing     Problem: Safety - Adult  Goal: Free from fall injury  Outcome: Progressing     Problem: Discharge Planning  Goal: Discharge to home or other facility with appropriate resources  Outcome: Progressing     Problem: Chronic Conditions and Co-morbidities  Goal: Patient's chronic conditions and co-morbidity symptoms are monitored and maintained or improved  Outcome: Progressing       The clinical goals for the shift include pt will move to hospice care by end of shift

## 2024-07-01 NOTE — PROGRESS NOTES
07/01/24 1006   Discharge Planning   Living Arrangements Spouse/significant other   Support Systems Spouse/significant other   Assistance Needed A&OX3; independent with ADLs with rollator; doesn't drive; room air baseline and room air currently; on plavix prior to hospitalization   Type of Residence Private residence   Number of Stairs to Enter Residence 2   Number of Stairs Within Residence 12   Do you have animals or pets at home? Yes   Type of Animals or Pets 1 cat   Who is requesting discharge planning? Provider   Home or Post Acute Services Other (Comment)  (TBD)   Patient expects to be discharged to: Awaiting meeting with [facility] at 1030 today for further dc planning.   Does the patient need discharge transport arranged? Yes   RoundTrip coordination needed? Yes   Has discharge transport been arranged? No

## 2024-07-01 NOTE — CARE PLAN
Problem: Safety - Adult  Goal: Free from fall injury  Flowsheets (Taken 2024 1228)  Free from fall injury: Instruct family/caregiver on patient safety     Problem: Skin  Goal: Prevent/manage excess moisture  Outcome: Progressing  Note: Pt to be turned every 2 hours or as family desires.   Goal: Prevent/minimize sheer/friction injuries  Outcome: Progressing  Goal: Promote/optimize nutrition  Outcome: Progressing  Goal: Promote skin healing  Outcome: Progressing   The patient's goals for the shift include      The clinical goals for the shift include Pt will show no signs of respiratory distress during this shift. Progressing  1555: pt required bolus of dilaudid 0.2 mg per order for air hungar  1630: Pt  family at bedside.    1732: Post mortem care being done.

## 2024-07-01 NOTE — PROGRESS NOTES
Physical Therapy                 Therapy Communication Note    Patient Name: Kevin Donato  MRN: [id number]  Today's Date: 7/1/2024     Discipline: Physical Therapy    Missed Visit Reason: Missed Visit Reason: Cancel (Spoke with RN, states pt has signed over to hospice, transfering from hospital within the hour.)    Missed Time: Cancel    Comment:

## 2024-07-01 NOTE — DISCHARGE SUMMARY
Discharge Diagnosis  Right Cerebellar and Medullary CVA on DAPT and Statin prior  Aspiration  Mucus plugging  Acute hypoxic respiratory failure  Acute metabolic encephalopathy  Acute kidney injury  Goals of care discussion    Test Results Pending At Discharge  Pending Labs       No current pending labs.            Hospital Course  Brief HPI:  Kevin Donato is a 84 y.o. year old male  patient with past medical history significant for IDDM-2, CKD 3, BPH, HTN, HLD, history of previous stroke, GERD, insomnia admitted on account of right-sided weakness     Hospital Course:  Admitted for ruling out stroke.  Lab work done for the patient was Nonsignificant.  CT head done for the patient was negative for any acute intracranial hemorrhage, stroke or bleed.  Chest x-ray was also done for the patient which showed no acute/active cardiopulmonary disease.  Patient was admitted and MRI head was ordered for him.  It showed Acute small right cerebellar hemisphere ischemic infarcts with questionable hyperintense foci in vermis and left cerebellar hemisphere which may be small subacute ischemic infarcts .Loss of flow related signal in the right vertebral artery with minimal distal reconstitution of the V4 segment.  SLP was consulted for the patient due to concern for aspiration secondary to CVA.  The findings are consistent with patient having aspiration and recommended MBS studies.  Patient continued to aspirate his secretions and developed worsening respiratory status.  He went from room air to high flow nasal cannula to airvo.  He was subsequently transferred to the stepdown unit due to increasing oxygen demands.  CT chest was ordered for the patient which showed some secretions/mucus present in the distal trachea in the right main bronchus with mucous plugging and bronchial wall thickening noted throughout the airway.  Findings were discussed with the family of the patient.  Pulmonology was consulted who stated that [facility]  "would be a temporary treatment for his aspiration but he would continue to aspirate from his recurrent stroke.  Tracheostomy was not consistent with patient's wishes.  extensive discussion was done with family on 06/30/2024. PEG will also not be consistent with patient's wishes and would not resolve his respiration/aspiration problems.  Further discussions held.  Ultimately hospice was consulted on 06/30/2024. Family made the decision to pursue hospice and patient was made DNR/Comfort care. Plan to discharge from current encounter and readmit patient hospice GIP here.       Pertinent Physical Exam At Time of Discharge  Physical Exam    Constitutional:       Appearance: He is ill-appearing.   HENT:      Head: Normocephalic and atraumatic.   Cardiovascular:      Heart sounds: No murmur heard.     No friction rub. No gallop.   Pulmonary:      Effort: Respiratory distress present.      Breath sounds: Stridor present. Rales present.   Abdominal:      General: Abdomen is flat. Bowel sounds are normal.      Palpations: Abdomen is soft. There is no mass.      Tenderness: There is no abdominal tenderness. There is no guarding.   Musculoskeletal:         General: No tenderness. Normal range of motion.      Right lower leg: Edema present.      Left lower leg: Edema present.   Neurological:      Comments: Resting in bed on airvo   Psychiatric:         Mood and Affect: Mood normal.         Behavior: Behavior normal.     Home Medications     Medication List      CONTINUE taking these medications     lancets 26 gauge misc   Sure Comfort Pen Needle 32 gauge x 5/32\" needle; Generic drug: pen   needle, diabetic; Take as directed. Use daily for lantus and 3times a day   for humalog coverage     ASK your doctor about these medications     acetaminophen 325 mg tablet; Commonly known as: Tylenol   aspirin 81 mg chewable tablet; Chew 1 tablet (81 mg) once daily.   atorvastatin 80 mg tablet; Commonly known as: Lipitor; Take 1 tablet (80 "   mg) by mouth once daily at bedtime.   cholecalciferol 50 mcg (2,000 unit) capsule; Commonly known as: Vitamin   D-3; Take 1 capsule (50 mcg) by mouth once daily in the morning.   clopidogrel 75 mg tablet; Commonly known as: Plavix; Take 1 tablet (75   mg) by mouth once daily.   * Contour Test Strips strip; Generic drug: blood sugar diagnostic   * True Metrix Glucose Test Strip strip; Generic drug: blood sugar   diagnostic   cyanocobalamin (vitamin B-12) 1,000 mcg tablet extended release;   Commonly known as: Vitamin B-12; Take 2 tablets (2,000 mcg) by mouth once   daily in the morning. As directed   fluticasone 50 mcg/actuation nasal spray; Commonly known as: Flonase;   Administer 1 spray into each nostril once daily as needed for rhinitis.   Shake gently. Before first use, prime pump. After use, clean tip and   replace cap.   gabapentin 100 mg capsule; Commonly known as: Neurontin; Take 2 capsules   (200 mg) by mouth 2 times a day.   insulin lispro 100 unit/mL injection; Commonly known as: HumaLOG KwikPen   Insulin; INJECT 3 to 7 UNITS 3 times daily   Lantus Solostar U-100 Insulin 100 unit/mL (3 mL) pen; Generic drug:   insulin glargine; Inject 43 Units under the skin once daily in the   morning. Take as directed per insulin instructions.   losartan 100 mg tablet; Commonly known as: Cozaar; Take 1 tablet (100   mg) by mouth once daily.   mirabegron 25 mg tablet extended release 24 hr 24 hr tablet; Commonly   known as: Myrbetriq; Take 1 tablet (25 mg) by mouth once daily.   multivitamin tablet; Take 1 tablet by mouth once daily in the morning.   omega 3-dha-epa-fish oil 1,200 (144-216) mg capsule; Take 1 capsule   (1,200 mg) by mouth once daily.   omeprazole 20 mg DR capsule; Commonly known as: PriLOSEC; Take 1 capsule   (20 mg) by mouth once daily.   tamsulosin 0.4 mg 24 hr capsule; Commonly known as: Flomax; Take 1   capsule (0.4 mg) by mouth once daily at bedtime.   traMADol 25 mg split tablet; Commonly known  as: Ultram   triamcinolone 0.1 % cream; Commonly known as: Kenalog; Apply topically 2   times a day. Apply to affected area 1-2 times daily as needed. Avoid face   and groin.  * This list has 2 medication(s) that are the same as other medications   prescribed for you. Read the directions carefully, and ask your doctor or   other care provider to review them with you.       Outpatient Follow-Up  Future Appointments   Date Time Provider Department Center   7/11/2024 11:20 AM DO Edy [email or web address] [facility]   8/29/2024 11:40 AM Juan C Denney MD [id number] [facility]   11/14/2024 12:20 PM DO Edy [email or web address] [facility]       Rick Liu MD

## 2024-07-01 NOTE — CARE PLAN
The patient's goals for the shift include  to remain comfortable    The clinical goals for the shift include pt will move to hospice care by end of shift

## 2024-07-01 NOTE — CARE PLAN
Pt will be comfortable without respiratory distress this shift.  Problem: Safety - Adult  Goal: Free from fall injury  Flowsheets (Taken 7/1/2024 1228)  Free from fall injury: Instruct family/caregiver on patient safety

## 2024-07-01 NOTE — PROGRESS NOTES
Kevin Donato is a 84 y.o. male on day 2 of admission presenting with Stroke-like symptom.      Subjective   Patient seen and examined at bedside this morning. Patient non-responsive. Appeared comfortable.  Provided support to wife at bedside.    Objective     Last Recorded Vitals  BP (!) 100/45 (BP Location: Right arm, Patient Position: Lying)   Pulse 70   Temp 36.3 °C (97.3 °F) (Temporal)   Resp 25   Wt 95.9 kg (211 lb 6.7 oz)   SpO2 98%   Intake/Output last 3 Shifts:    Intake/Output Summary (Last 24 hours) at 7/1/2024 1135  Last data filed at 7/1/2024 1043  Gross per 24 hour   Intake 2157.98 ml   Output 650 ml   Net 1507.98 ml       Admission Weight  Weight: 93.6 kg (206 lb 5.6 oz) (06/28/24 1055)    Daily Weight  07/01/24 : 95.7 kg (211 lb)    Image Results  CT chest wo IV contrast  Addendum: Interpreted By:  Blanca Corbin,    ADDENDUM:   Tip of the enteric tube terminates in the distal gastric body.        Signed by: Blanca Corbin 6/29/2024 9:43 PM        -------- ORIGINAL REPORT --------   Dictation workstation:   [id number]  Narrative: Interpreted By:  Blanca Corbin,   STUDY:  CT CHEST WO IV CONTRAST;  6/29/2024 6:18 pm      INDICATION:  Signs/Symptoms:NGT placement, concern for aspiration.      COMPARISON:  CT cardiac scoring dated 06/08/2015; CT of the abdomen dated  07/09/2019;      ACCESSION NUMBER(S):  [id number]      ORDERING CLINICIAN:  NIK GUILLERMO      TECHNIQUE:  Helical data acquisition of the chest was obtained  without IV  contrast material.  Images were reformatted in axial, coronal, and  sagittal planes.      FINDINGS:  Exam is degraded by mild motion.      LUNGS AND AIRWAYS:  Mucus and secretions are present in the distal trachea and right  mainstem bronchus, with areas of mucous plugging and bronchial wall  thickening noted in the airways supplying the right lower lobe.  Geographic area of volume loss and consolidative airspace opacity is  present in the  inferior most aspect of the right lung base. Some  atelectatic changes with mucous plugging are also present in the left  lower lobe.      No pleural effusion or pneumothorax is evident.      Subtle ground-glass opacities are present in the right lung apex.      MEDIASTINUM AND RENAY, LOWER NECK AND AXILLA:  The visualized thyroid gland is within normal limits.      Mildly prominent mediastinal lymph nodes are noted, without enlarged  lymphadenopathy by CT imaging criteria.      Enteric tube is present within the esophagus. No acute esophageal  abnormality is evident.      There is no evidence of pneumomediastinum.      HEART AND VESSELS:  The thoracic aorta is of normal course and caliber with  mild-to-moderate vascular calcifications.      Main pulmonary artery and its branches are normal in caliber.      Mild-to-moderate coronary artery calcifications are seen. The study  is not optimized for evaluation of coronary arteries.      The cardiac chambers are not enlarged.      No evidence of pericardial effusion.      UPPER ABDOMEN:  The visualized subdiaphragmatic structures demonstrate no remarkable  findings.      CHEST WALL AND OSSEOUS STRUCTURES:  There are no suspicious osseous lesions. Multilevel degenerative  changes are present without evidence of compression fractures or  high-grade stenosis.      Impression: 1.  Some secretions/mucous are present in the distal trachea in the  right mainstem bronchus, with mucous plugging and bronchial wall  thickening noted throughout the airways supplying the right lower  lobe. There is also some volume loss with small consolidative opacity  present in the right lung base, possibly representing early  aspiration pneumonia. Mild mucous plugging with volume loss is also  present in the left lung base.  2. No sizable pleural effusion is evident. Subtle ground-glass  opacities in the right lung apex may represent component of focal  infectious/inflammatory process.  3.  Mild-to-moderate coronary artery calcifications.      MACRO:  None      Signed by: Blanca Corbin 6/29/2024 8:56 PM  Dictation workstation:   [id number]  XR chest 1 view  Narrative: Interpreted By:  Collin Juarez,   STUDY:  XR CHEST 1 VIEW      INDICATION:  Signs/Symptoms:Confirm NGT.      COMPARISON:  None      ACCESSION NUMBER(S):  [id number]      ORDERING CLINICIAN:  NIK GUILLERMO      FINDINGS:  Nasogastric tube seen but unfortunately, due to poor penetration of  the images the tip is not definitively confirmed 4th anatomic  location.      A repeat exam can be performed      Impression: Nasogastric tube seen but unfortunately, due to poor penetration of  the images the tip is not definitively confirmed 4th anatomic  location.      A repeat exam can be performed      Signed by: Collin Juarez 6/29/2024 4:41 PM  Dictation workstation:   [id number]  XR chest 1 view  Narrative: Interpreted By:  Maria M Mckinnon,   STUDY:  XR CHEST 1 VIEW;  6/29/2024 7:08 am      INDICATION:  Signs/Symptoms:hypoxemia overnight.      COMPARISON:  06/28/2024      ACCESSION NUMBER(S):  [id number]      ORDERING CLINICIAN:  NIK GUILLERMO      FINDINGS:  CARDIOMEDIASTINAL SILHOUETTE:  Cardiomediastinal silhouette is normal in size and configuration.          LUNGS:  Lungs are clear.      ABDOMEN:  No remarkable upper abdominal findings.          BONES:  No acute osseous changes.      Impression: No acute cardiopulmonary process.      MACRO:  None      Signed by: Maria M Mckinnon 6/29/2024 8:16 AM  Dictation workstation:   [id number]      Physical Exam  Constitutional: Unresponsive to verbal, tactile stimuli.   Cardiovascular: Regular rate and rhythm, S1, S2.   Respiratory: Shallow slow breathing. Appears comfortable. On Airvo  Abdomen: Soft, Doesn't appear to have any pain  Neurological: Unresponsive. No purposeful for spontaneous movements in room  Extremities: Warm and dry.     Relevant Results             CT chest wo IV  contrast    Addendum Date: 6/29/2024    Interpreted By:  Blanca Corbin, ADDENDUM: Tip of the enteric tube terminates in the distal gastric body.   Signed by: Blanca Corbin 6/29/2024 9:43 PM   -------- ORIGINAL REPORT -------- Dictation workstation:   [id number]    Result Date: 6/29/2024  Interpreted By:  Blanca Corbin, STUDY: CT CHEST WO IV CONTRAST;  6/29/2024 6:18 pm   INDICATION: Signs/Symptoms:NGT placement, concern for aspiration.   COMPARISON: CT cardiac scoring dated 06/08/2015; CT of the abdomen dated 07/09/2019;   ACCESSION NUMBER(S): [id number]   ORDERING CLINICIAN: NIK GUILLERMO   TECHNIQUE: Helical data acquisition of the chest was obtained  without IV contrast material.  Images were reformatted in axial, coronal, and sagittal planes.   FINDINGS: Exam is degraded by mild motion.   LUNGS AND AIRWAYS: Mucus and secretions are present in the distal trachea and right mainstem bronchus, with areas of mucous plugging and bronchial wall thickening noted in the airways supplying the right lower lobe. Geographic area of volume loss and consolidative airspace opacity is present in the inferior most aspect of the right lung base. Some atelectatic changes with mucous plugging are also present in the left lower lobe.   No pleural effusion or pneumothorax is evident.   Subtle ground-glass opacities are present in the right lung apex.   MEDIASTINUM AND RENAY, LOWER NECK AND AXILLA: The visualized thyroid gland is within normal limits.   Mildly prominent mediastinal lymph nodes are noted, without enlarged lymphadenopathy by CT imaging criteria.   Enteric tube is present within the esophagus. No acute esophageal abnormality is evident.   There is no evidence of pneumomediastinum.   HEART AND VESSELS: The thoracic aorta is of normal course and caliber with mild-to-moderate vascular calcifications.   Main pulmonary artery and its branches are normal in caliber.   Mild-to-moderate coronary artery  calcifications are seen. The study is not optimized for evaluation of coronary arteries.   The cardiac chambers are not enlarged.   No evidence of pericardial effusion.   UPPER ABDOMEN: The visualized subdiaphragmatic structures demonstrate no remarkable findings.   CHEST WALL AND OSSEOUS STRUCTURES: There are no suspicious osseous lesions. Multilevel degenerative changes are present without evidence of compression fractures or high-grade stenosis.       1.  Some secretions/mucous are present in the distal trachea in the right mainstem bronchus, with mucous plugging and bronchial wall thickening noted throughout the airways supplying the right lower lobe. There is also some volume loss with small consolidative opacity present in the right lung base, possibly representing early aspiration pneumonia. Mild mucous plugging with volume loss is also present in the left lung base. 2. No sizable pleural effusion is evident. Subtle ground-glass opacities in the right lung apex may represent component of focal infectious/inflammatory process. 3. Mild-to-moderate coronary artery calcifications.   MACRO: None   Signed by: Blanca Corbin 6/29/2024 8:56 PM Dictation workstation:   [id number]    XR chest 1 view    Result Date: 6/29/2024  Interpreted By:  Collin Juarez, STUDY: XR CHEST 1 VIEW   INDICATION: Signs/Symptoms:Confirm NGT.   COMPARISON: None   ACCESSION NUMBER(S): [id number]   ORDERING CLINICIAN: NIK GUILLERMO   FINDINGS: Nasogastric tube seen but unfortunately, due to poor penetration of the images the tip is not definitively confirmed 4th anatomic location.   A repeat exam can be performed       Nasogastric tube seen but unfortunately, due to poor penetration of the images the tip is not definitively confirmed 4th anatomic location.   A repeat exam can be performed   Signed by: Collin Juarez 6/29/2024 4:41 PM Dictation workstation:   [id number]    XR chest 1 view    Result Date: 6/29/2024  Interpreted By:   Maria M Mckinnon, STUDY: XR CHEST 1 VIEW;  6/29/2024 7:08 am   INDICATION: Signs/Symptoms:hypoxemia overnight.   COMPARISON: 06/28/2024   ACCESSION NUMBER(S): [id number]   ORDERING CLINICIAN: NIK GUILLERMO   FINDINGS: CARDIOMEDIASTINAL SILHOUETTE: Cardiomediastinal silhouette is normal in size and configuration.     LUNGS: Lungs are clear.   ABDOMEN: No remarkable upper abdominal findings.     BONES: No acute osseous changes.       No acute cardiopulmonary process.   MACRO: None   Signed by: Maria M Mckinnon 6/29/2024 8:16 AM Dictation workstation:   [id number]    MR brain wo IV contrast    Result Date: 6/28/2024  Interpreted By:  Hilaria Bellamy, STUDY: MR ANGIO NECK WO IV CONTRAST; MR BRAIN WO IV CONTRAST; MR ANGIO HEAD WO IV CONTRAST;  6/28/2024 8:28 pm   INDICATION: Signs/Symptoms:stroke like symptom; Signs/Symptoms:Stroke symptom. Stroke protocol.   COMPARISON: CT head 06/28/2024, MRI 05/23/2024   ACCESSION NUMBER(S): [id number]; [id number]; [id number]   ORDERING CLINICIAN: NIK GUILLERMO; JULIAN BONE   TECHNIQUE: Axial T2, FLAIR, DWI, gradient echo T2 and  sagittal and coronal T1 weighted images of brain were acquired.   Time-of-flight MRA of the head  and neck was performed. The images were reviewed as source images and maximum intensity projections.   FINDINGS: Brain:   CSF Spaces: The ventricles, sulci and basal cisterns enlarged, concordant with parenchymal volume loss.   Parenchyma: Restricted diffusion noted within regions of the right cerebellar hemisphere. Subtle DWI hyperintense signal without definite ADC hypointense signal noted within the left cerebellum as well as a punctate focus in the left vermis. Mild associated T2/FLAIR hyperintense white matter changes suggestive of edema. There is additional mild to moderate nonspecific white matter changes. Regions of encephalomalacia in the right greater than left cerebellar hemisphere likely secondary to remote ischemic infarcts. The  significant generalized parenchymal volume loss noted. Gradient echo hypointense signal within the medial inferior right cerebellum which may be related to remote hemosiderin deposition. There is no mass effect or midline shift.   Paranasal Sinuses and Mastoids: Mucosal thickening of predominantly the left maxillary sinus. Mastoid air cells are patent.   MRA of head:   Anterior circulation: At least moderate focal segmental narrowing of the distal right M1 segment and proximal M2 segments of the MCA. There is expected flow signal in bilateral intracranial internal carotid arteries, bilateral carotid terminals, bilateral proximal anterior and middle cerebral arteries.   Posterior circulation:  There a minimal faint hyperintense signal within the distal right intradural vertebral artery which may be partially related to retrograde flow. The left intracranial vertebral arteries, vertebrobasilar junction, basilar artery and proximal posterior cerebral arteries demonstrate expected flow signal.   MRA of neck:   The source images are mildly degraded by artifact.   Right carotid vessels:  There is expected flow signal in the visualized portion of the common carotid artery.  There is mild attenuation of flow signal at the carotid bifurcation which may be secondary to flow related artifact. The internal carotid artery in the neck demonstrates expected flow signal.   Left carotid vessels:   There is expected flow signal in the visualized portion of the common carotid artery.  There is mild attenuation of flow signal at the carotid bifurcation which may be secondary to flow related artifact. The internal carotid artery in the neck demonstrates expected flow signal.   Vertebral vessels:   The right vertebral artery is not noted on time-of-flight imaging which could be related to high-grade narrowing, a lesion or retrograde flow. There is focal short segment narrowing the proximal to mid left vertebral artery otherwise with the  visualized segments of the cervical left vertebral arteries demonstrates expected flow signal.       MRI Brain:   Acute small right cerebellar hemisphere ischemic infarcts without significant mass effect. Adjacent remote appearing ischemic infarcts noted as well as an adjacent chronic focus of hemosiderin deposition.   Faint questionable DWI hyperintense foci in the vermis and left cerebellar hemisphere which may represent subacute small ischemic infarcts.   Generalized parenchymal volume loss and nonspecific white matter changes.   MRA:   Loss of flow related signal in the right vertebral artery with minimal distal reconstitution of the V4 segment, unchanged from prior imaging   There is moderate focal narrowing noted in the distal right M1 segment and proximal right M2 segments, unchanged from prior imaging.   No evidence of new or significant changes intracranial or cervical vascular stenosis/occlusion.   MACRO: None   Signed by: Hilaria Bellamy 6/28/2024 9:40 PM Dictation workstation:   [id number]    MR angio neck wo IV contrast    Result Date: 6/28/2024  Interpreted By:  Hilaria Bellamy, STUDY: MR ANGIO NECK WO IV CONTRAST; MR BRAIN WO IV CONTRAST; MR ANGIO HEAD WO IV CONTRAST;  6/28/2024 8:28 pm   INDICATION: Signs/Symptoms:stroke like symptom; Signs/Symptoms:Stroke symptom. Stroke protocol.   COMPARISON: CT head 06/28/2024, MRI 05/23/2024   ACCESSION NUMBER(S): [id number]; [id number]; [id number]   ORDERING CLINICIAN: NIK BONE   TECHNIQUE: Axial T2, FLAIR, DWI, gradient echo T2 and  sagittal and coronal T1 weighted images of brain were acquired.   Time-of-flight MRA of the head  and neck was performed. The images were reviewed as source images and maximum intensity projections.   FINDINGS: Brain:   CSF Spaces: The ventricles, sulci and basal cisterns enlarged, concordant with parenchymal volume loss.   Parenchyma: Restricted diffusion noted within regions of the right cerebellar  hemisphere. Subtle DWI hyperintense signal without definite ADC hypointense signal noted within the left cerebellum as well as a punctate focus in the left vermis. Mild associated T2/FLAIR hyperintense white matter changes suggestive of edema. There is additional mild to moderate nonspecific white matter changes. Regions of encephalomalacia in the right greater than left cerebellar hemisphere likely secondary to remote ischemic infarcts. The significant generalized parenchymal volume loss noted. Gradient echo hypointense signal within the medial inferior right cerebellum which may be related to remote hemosiderin deposition. There is no mass effect or midline shift.   Paranasal Sinuses and Mastoids: Mucosal thickening of predominantly the left maxillary sinus. Mastoid air cells are patent.   MRA of head:   Anterior circulation: At least moderate focal segmental narrowing of the distal right M1 segment and proximal M2 segments of the MCA. There is expected flow signal in bilateral intracranial internal carotid arteries, bilateral carotid terminals, bilateral proximal anterior and middle cerebral arteries.   Posterior circulation:  There a minimal faint hyperintense signal within the distal right intradural vertebral artery which may be partially related to retrograde flow. The left intracranial vertebral arteries, vertebrobasilar junction, basilar artery and proximal posterior cerebral arteries demonstrate expected flow signal.   MRA of neck:   The source images are mildly degraded by artifact.   Right carotid vessels:  There is expected flow signal in the visualized portion of the common carotid artery.  There is mild attenuation of flow signal at the carotid bifurcation which may be secondary to flow related artifact. The internal carotid artery in the neck demonstrates expected flow signal.   Left carotid vessels:   There is expected flow signal in the visualized portion of the common carotid artery.  There is  mild attenuation of flow signal at the carotid bifurcation which may be secondary to flow related artifact. The internal carotid artery in the neck demonstrates expected flow signal.   Vertebral vessels:   The right vertebral artery is not noted on time-of-flight imaging which could be related to high-grade narrowing, a lesion or retrograde flow. There is focal short segment narrowing the proximal to mid left vertebral artery otherwise with the visualized segments of the cervical left vertebral arteries demonstrates expected flow signal.       MRI Brain:   Acute small right cerebellar hemisphere ischemic infarcts without significant mass effect. Adjacent remote appearing ischemic infarcts noted as well as an adjacent chronic focus of hemosiderin deposition.   Faint questionable DWI hyperintense foci in the vermis and left cerebellar hemisphere which may represent subacute small ischemic infarcts.   Generalized parenchymal volume loss and nonspecific white matter changes.   MRA:   Loss of flow related signal in the right vertebral artery with minimal distal reconstitution of the V4 segment, unchanged from prior imaging   There is moderate focal narrowing noted in the distal right M1 segment and proximal right M2 segments, unchanged from prior imaging.   No evidence of new or significant changes intracranial or cervical vascular stenosis/occlusion.   MACRO: None   Signed by: Hilaria Bellamy 6/28/2024 9:40 PM Dictation workstation:   [id number]    MR angio head wo IV contrast    Result Date: 6/28/2024  Interpreted By:  Hilaria Bellamy, STUDY: MR ANGIO NECK WO IV CONTRAST; MR BRAIN WO IV CONTRAST; MR ANGIO HEAD WO IV CONTRAST;  6/28/2024 8:28 pm   INDICATION: Signs/Symptoms:stroke like symptom; Signs/Symptoms:Stroke symptom. Stroke protocol.   COMPARISON: CT head 06/28/2024, MRI 05/23/2024   ACCESSION NUMBER(S): [id number]; [id number]; [id number]   ORDERING CLINICIAN: NIK BONE   TECHNIQUE:  Axial T2, FLAIR, DWI, gradient echo T2 and  sagittal and coronal T1 weighted images of brain were acquired.   Time-of-flight MRA of the head  and neck was performed. The images were reviewed as source images and maximum intensity projections.   FINDINGS: Brain:   CSF Spaces: The ventricles, sulci and basal cisterns enlarged, concordant with parenchymal volume loss.   Parenchyma: Restricted diffusion noted within regions of the right cerebellar hemisphere. Subtle DWI hyperintense signal without definite ADC hypointense signal noted within the left cerebellum as well as a punctate focus in the left vermis. Mild associated T2/FLAIR hyperintense white matter changes suggestive of edema. There is additional mild to moderate nonspecific white matter changes. Regions of encephalomalacia in the right greater than left cerebellar hemisphere likely secondary to remote ischemic infarcts. The significant generalized parenchymal volume loss noted. Gradient echo hypointense signal within the medial inferior right cerebellum which may be related to remote hemosiderin deposition. There is no mass effect or midline shift.   Paranasal Sinuses and Mastoids: Mucosal thickening of predominantly the left maxillary sinus. Mastoid air cells are patent.   MRA of head:   Anterior circulation: At least moderate focal segmental narrowing of the distal right M1 segment and proximal M2 segments of the MCA. There is expected flow signal in bilateral intracranial internal carotid arteries, bilateral carotid terminals, bilateral proximal anterior and middle cerebral arteries.   Posterior circulation:  There a minimal faint hyperintense signal within the distal right intradural vertebral artery which may be partially related to retrograde flow. The left intracranial vertebral arteries, vertebrobasilar junction, basilar artery and proximal posterior cerebral arteries demonstrate expected flow signal.   MRA of neck:   The source images are mildly  degraded by artifact.   Right carotid vessels:  There is expected flow signal in the visualized portion of the common carotid artery.  There is mild attenuation of flow signal at the carotid bifurcation which may be secondary to flow related artifact. The internal carotid artery in the neck demonstrates expected flow signal.   Left carotid vessels:   There is expected flow signal in the visualized portion of the common carotid artery.  There is mild attenuation of flow signal at the carotid bifurcation which may be secondary to flow related artifact. The internal carotid artery in the neck demonstrates expected flow signal.   Vertebral vessels:   The right vertebral artery is not noted on time-of-flight imaging which could be related to high-grade narrowing, a lesion or retrograde flow. There is focal short segment narrowing the proximal to mid left vertebral artery otherwise with the visualized segments of the cervical left vertebral arteries demonstrates expected flow signal.       MRI Brain:   Acute small right cerebellar hemisphere ischemic infarcts without significant mass effect. Adjacent remote appearing ischemic infarcts noted as well as an adjacent chronic focus of hemosiderin deposition.   Faint questionable DWI hyperintense foci in the vermis and left cerebellar hemisphere which may represent subacute small ischemic infarcts.   Generalized parenchymal volume loss and nonspecific white matter changes.   MRA:   Loss of flow related signal in the right vertebral artery with minimal distal reconstitution of the V4 segment, unchanged from prior imaging   There is moderate focal narrowing noted in the distal right M1 segment and proximal right M2 segments, unchanged from prior imaging.   No evidence of new or significant changes intracranial or cervical vascular stenosis/occlusion.   MACRO: None   Signed by: Hilaria Bellamy 6/28/2024 9:40 PM Dictation workstation:   [id number]    ECG 12 lead    Result Date:  6/28/2024  Sinus rhythm with Premature atrial complexes Low voltage QRS Borderline ECG When compared with ECG of 27-JAN-2022 10:32, Premature atrial complexes are now Present    XR chest 1 view    Result Date: 6/28/2024  Interpreted By:  Hema Lockwood, STUDY: XR CHEST 1 VIEW;  6/28/2024 11:20 am   INDICATION: Signs/Symptoms:dizzy.   COMPARISON: None.   ACCESSION NUMBER(S): [id number]   ORDERING CLINICIAN: JULIAN BONE   FINDINGS: CARDIOMEDIASTINAL SILHOUETTE AND VASCULATURE:   Cardiac size:  Within normal limits. Aortic shadow:  Within normal limits.   Mediastinal contours: Within normal limits.   Pulmonary vasculature:  The central vasculature is unremarkable   LUNGS: Lungs are clear.   ABDOMEN AND OTHER FINDINGS: No remarkable upper abdominal findings.   BONES: No acute osseous changes.       1.  No active cardiopulmonary disease.   Signed by: Hema Lockwood 6/28/2024 11:30 AM Dictation workstation:   [id number]    CT brain attack head wo IV contrast    Result Date: 6/28/2024  Interpreted By:  Hema Lockwood, STUDY: CT BRAIN ATTACK HEAD WO IV CONTRAST;  6/28/2024 11:04 am   INDICATION: Signs/Symptoms:Stroke Evaluation.   COMPARISON: 05/24/2024   ACCESSION NUMBER(S): [id number]   ORDERING CLINICIAN: SEBASTIÁN ROSENBERG   TECHNIQUE: Sequential trans axial images were obtained  .   FINDINGS: INTRACRANIAL:   CORTICAL SULCI AND EXTRA-AXIAL SPACES:  Mild prominence indicating age related atrophy.   VENTRICULAR SYSTEM:  Mild-to-moderate ventriculomegaly, also probably from age related atrophy. However, as this appears slightly greater in relation to sulcal prominence a component of NPH is possible   CEREBRAL PARENCHYMA:  Focal hypodensity is present at the right cerebellum laterally most likely from remote infarction. Otherwisethere is no evidence of definite subacute infarction, intracranial hemorrhage or mass.   EXTRACRANIAL: There is mucosal thickening of the left maxillary air cell, with cortical thickening of the  visualized sinus walls indicating chronic sinusitis. The calvarium is intact.       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 Otherwise no acute findings or significant interval change.   MACRO: Hema Lockwood discussed the significance and urgency of this critical finding by secure epic chat with  SEBASTIÁN ROSENBERG on 6/28/2024 at 11:29 am.  (**-RCF-**) Findings:  See findings.   Signed by: Hema Lockwood 6/28/2024 11:30 AM Dictation workstation:   [id number]   Results for orders placed or performed during the hospital encounter of 06/28/24 (from the past 24 hour(s))   POCT GLUCOSE   Result Value Ref Range    POCT Glucose 187 (H) 74 - 99 mg/dL   POCT GLUCOSE   Result Value Ref Range    POCT Glucose 151 (H) 74 - 99 mg/dL   POCT GLUCOSE   Result Value Ref Range    POCT Glucose 187 (H) 74 - 99 mg/dL   POCT GLUCOSE   Result Value Ref Range    POCT Glucose 189 (H) 74 - 99 mg/dL   POCT GLUCOSE   Result Value Ref Range    POCT Glucose 186 (H) 74 - 99 mg/dL   CBC   Result Value Ref Range    WBC 11.7 (H) 4.4 - 11.3 x10*3/uL    nRBC 0.0 0.0 - 0.0 /100 WBCs    RBC 3.05 (L) 4.50 - 5.90 x10*6/uL    Hemoglobin 10.2 (L) 13.5 - 17.5 g/dL    Hematocrit 31.6 (L) 41.0 - 52.0 %     (H) 80 - 100 fL    MCH 33.4 26.0 - 34.0 pg    MCHC 32.3 32.0 - 36.0 g/dL    RDW 12.5 11.5 - 14.5 %    Platelets 146 (L) 150 - 450 x10*3/uL   Renal Function Panel   Result Value Ref Range    Glucose 169 (H) 74 - 99 mg/dL    Sodium 143 136 - 145 mmol/L    Potassium 4.5 3.5 - 5.3 mmol/L    Chloride 110 (H) 98 - 107 mmol/L    Bicarbonate 27 21 - 32 mmol/L    Anion Gap 11 10 - 20 mmol/L    Urea Nitrogen 39 (H) 6 - 23 mg/dL    Creatinine 1.97 (H) 0.50 - 1.30 mg/dL    eGFR 32 (L) >60 mL/min/1.73m*2    Calcium 8.0 (L) 8.6 - 10.3 mg/dL    Phosphorus 4.3 2.5 - 4.9 mg/dL    Albumin 3.1 (L) 3.4 - 5.0 g/dL   Magnesium   Result Value Ref Range    Magnesium 2.08 1.60 - 2.40 mg/dL   POCT GLUCOSE   Result Value Ref Range    POCT Glucose 162 (H) 74 -  99 mg/dL         Assessment/Plan      Issues Addressed in Hospital:  # Right medullary and cerebellar CVA  # Aspiration  # Aspiration pneumonia  # Acute hypoxic respiratory failure  # Mucus plugging  # ROSALBA  # Dehydration  # Acute metabolic encephalopaty  # Goals of care discussion      Plan:  Plan to discharge and readmit hospice Gip today.   For clarification, I will not bill for a discharge or readmit H&P today as I will continue to manage patient in new GIP encounter. Will bill a daily progress note      Allan Merlos, DO

## 2024-07-05 LAB
ATRIAL RATE: 66 BPM
P AXIS: 67 DEGREES
P OFFSET: 184 MS
P ONSET: 128 MS
PR INTERVAL: 174 MS
Q ONSET: 215 MS
QRS COUNT: 11 BEATS
QRS DURATION: 102 MS
QT INTERVAL: 442 MS
QTC CALCULATION(BAZETT): 463 MS
QTC FREDERICIA: 456 MS
R AXIS: 19 DEGREES
T AXIS: 58 DEGREES
T OFFSET: 436 MS
VENTRICULAR RATE: 66 BPM

## 2024-07-11 ENCOUNTER — APPOINTMENT (OUTPATIENT)
Dept: PRIMARY CARE | Facility: CLINIC | Age: 84
End: 2024-07-11
Payer: MEDICARE

## 2024-08-29 ENCOUNTER — APPOINTMENT (OUTPATIENT)
Dept: ENDOCRINOLOGY | Facility: CLINIC | Age: 84
End: 2024-08-29
Payer: MEDICARE

## 2024-11-14 ENCOUNTER — APPOINTMENT (OUTPATIENT)
Dept: PRIMARY CARE | Facility: CLINIC | Age: 84
End: 2024-11-14
Payer: MEDICARE